# Patient Record
Sex: FEMALE | Race: BLACK OR AFRICAN AMERICAN | NOT HISPANIC OR LATINO | URBAN - METROPOLITAN AREA
[De-identification: names, ages, dates, MRNs, and addresses within clinical notes are randomized per-mention and may not be internally consistent; named-entity substitution may affect disease eponyms.]

---

## 2017-11-04 ENCOUNTER — EMERGENCY (EMERGENCY)
Facility: HOSPITAL | Age: 32
LOS: 1 days | Discharge: HOME | End: 2017-11-04
Admitting: EMERGENCY MEDICINE
Payer: OTHER MISCELLANEOUS

## 2017-11-04 VITALS
RESPIRATION RATE: 16 BRPM | DIASTOLIC BLOOD PRESSURE: 82 MMHG | HEART RATE: 88 BPM | SYSTOLIC BLOOD PRESSURE: 126 MMHG | OXYGEN SATURATION: 100 % | TEMPERATURE: 98 F

## 2017-11-04 PROCEDURE — 99283 EMERGENCY DEPT VISIT LOW MDM: CPT | Mod: 25

## 2017-11-04 PROCEDURE — 99053 MED SERV 10PM-8AM 24 HR FAC: CPT

## 2017-11-05 NOTE — ED PROVIDER NOTE - MEDICAL DECISION MAKING DETAILS
33 yo F, with no significant PMH presenting to ER c/o needle stick of left thumb today.   -low risk for transmission, PEP offer but patient refused, Occupation exposure labs, f/u with Employee health service.

## 2017-11-05 NOTE — ED PROVIDER NOTE - NS_EDPROVIDERDISPOUSERTYPE_ED_A_ED
After Visit Summary      Facility     Name Address Phone       [facility] [address] [place] [address] [phone]            Patient Discharge Summary   2/3/2017    Mainor Irving            Please bring this medication reconciliation form to your next doctor’s appointment(s). Please update the form if you stop taking any of these medications or you start taking any new medications including over the counter medications. Also please carry a copy of this form with you at all times in the event of an emergency.    A copy of these discharge instructions was reviewed with and given to the patient/patient representative/Guardian/Caregiver at discharge.          Allergies as of 2/3/2017     No Known Allergies           Discharge Medications           Your Medications       Start Taking     No Medications Reported      Continue These Medications Which Have Not Changed     ASPIRIN 81 MG CHEWABLE TABLET Chew 162 mg by mouth daily.    Notes:               LOSARTAN-HYDROCHLOROTHIAZIDE (HYZAAR) 100-25 MG PER TABLET Take 1 tablet by mouth daily.    Notes:               SIMVASTATIN (ZOCOR) 40 MG TABLET Take 40 mg by mouth nightly.    Notes:   --          TAMSULOSIN (FLOMAX) 0.4 MG CAP TAKE 1 CAPSULE BY MOUTH DAILY AFTER A MEAL.    Notes:   --            These Medications Have Changed     No Medications Reported      Stop taking these medications, discuss with your pharmacist     HYDROCODONE-ACETAMINOPHEN (NORCO) 5-325 MG PER TABLET Take 1-2 tablets by mouth every 6 hours as needed for Pain.    Notes:   --          PHENAZOPYRIDINE (PYRIDIUM) 100 MG TABLET Take 1 tablet by mouth 3 times daily as needed for Pain.    Notes:   --            Facility Administered Medications     No Medications Reported      Your To Do List     4/17/2017 2:00 PM     Appointment with Kai Raphael MD at [facility] ([phone])       8/14/2017 11:00 AM    Appointment with Radames Costello MD at [facility] ([phone])         Discharge Instructions     None      Discharge References/Attachments     None       Your Opinion Matters To Us  If you receive a patient satisfaction survey in the mail, please complete and return it in the postage-paid envelope.    We truly value and appreciate your feedback.           Mainor Irving        Your Current Medications Are        Details    tamsulosin (FLOMAX) 0.4 MG Cap TAKE 1 CAPSULE BY MOUTH DAILY AFTER A MEAL.    aspirin 81 MG chewable tablet Chew 162 mg by mouth daily.    losartan-hydrochlorothiazide (HYZAAR) 100-25 MG per tablet Take 1 tablet by mouth daily.    simvastatin (ZOCOR) 40 MG tablet Take 40 mg by mouth nightly.       I have personally evaluated and examined the patient. The Attending was available to me as a supervising provider if needed.

## 2017-11-05 NOTE — ED PROVIDER NOTE - OBJECTIVE STATEMENT
31 yo F with no significant PMH, presenting to ER c/o needle stick of left thumb while injecting lidocaine for a laceration repair at work. States she pricked herself with a 22 gauze needle through the glove with some bleeding. admits washing wound with soap and water. denies any fever, chills, n/v, dizziness, finger pain, numbness, chest pain, sob or any other complaints. 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 States she pricked herself with a 22 gauze needle through the glove with some bleeding. admits washing wound with soap and water. denies any fever, chills, n/v, dizziness, finger pain, numbness, chest pain, sob or any other complaints. 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 States she pricked herself with a 22 gauze needle through the glove with some bleeding. admits washing wound with soap and water. denies any fever, chills, n/v, dizziness, finger pain, numbness, chest pain, sob or any other complaints. No known infectious disease in source pt. Tetanus UTD.

## 2017-11-05 NOTE — ED PROVIDER NOTE - PROGRESS NOTE DETAILS
CHRISTIAN POST: I have personally seen and examined this patient. I have reviewed and addended the HPI, ROS, PE, and A/P as necessary. I agree with the above plan of care.

## 2019-10-03 ENCOUNTER — RESULT REVIEW (OUTPATIENT)
Age: 34
End: 2019-10-03

## 2019-10-04 ENCOUNTER — APPOINTMENT (OUTPATIENT)
Dept: OBGYN | Facility: CLINIC | Age: 34
End: 2019-10-04
Payer: COMMERCIAL

## 2019-10-04 PROCEDURE — 99385 PREV VISIT NEW AGE 18-39: CPT

## 2019-10-04 PROCEDURE — 36415 COLL VENOUS BLD VENIPUNCTURE: CPT

## 2019-10-30 ENCOUNTER — TRANSCRIPTION ENCOUNTER (OUTPATIENT)
Age: 34
End: 2019-10-30

## 2020-01-31 ENCOUNTER — EMERGENCY (EMERGENCY)
Facility: HOSPITAL | Age: 35
LOS: 1 days | Discharge: HOME | End: 2020-01-31
Attending: EMERGENCY MEDICINE | Admitting: EMERGENCY MEDICINE
Payer: COMMERCIAL

## 2020-01-31 VITALS
RESPIRATION RATE: 17 BRPM | HEART RATE: 77 BPM | SYSTOLIC BLOOD PRESSURE: 127 MMHG | TEMPERATURE: 99 F | DIASTOLIC BLOOD PRESSURE: 90 MMHG | OXYGEN SATURATION: 100 %

## 2020-01-31 VITALS
HEART RATE: 79 BPM | OXYGEN SATURATION: 99 % | DIASTOLIC BLOOD PRESSURE: 90 MMHG | TEMPERATURE: 98 F | RESPIRATION RATE: 16 BRPM | SYSTOLIC BLOOD PRESSURE: 129 MMHG

## 2020-01-31 DIAGNOSIS — R07.89 OTHER CHEST PAIN: ICD-10-CM

## 2020-01-31 LAB
ALBUMIN SERPL ELPH-MCNC: 4.3 G/DL — SIGNIFICANT CHANGE UP (ref 3.3–5)
ALP SERPL-CCNC: 53 U/L — SIGNIFICANT CHANGE UP (ref 40–120)
ALT FLD-CCNC: 7 U/L — LOW (ref 10–45)
ANION GAP SERPL CALC-SCNC: 14 MMOL/L — SIGNIFICANT CHANGE UP (ref 5–17)
AST SERPL-CCNC: 14 U/L — SIGNIFICANT CHANGE UP (ref 10–40)
BILIRUB SERPL-MCNC: 0.2 MG/DL — SIGNIFICANT CHANGE UP (ref 0.2–1.2)
BUN SERPL-MCNC: 9 MG/DL — SIGNIFICANT CHANGE UP (ref 7–23)
CALCIUM SERPL-MCNC: 9 MG/DL — SIGNIFICANT CHANGE UP (ref 8.4–10.5)
CHLORIDE SERPL-SCNC: 106 MMOL/L — SIGNIFICANT CHANGE UP (ref 96–108)
CO2 SERPL-SCNC: 22 MMOL/L — SIGNIFICANT CHANGE UP (ref 22–31)
CREAT SERPL-MCNC: 0.62 MG/DL — SIGNIFICANT CHANGE UP (ref 0.5–1.3)
GLUCOSE SERPL-MCNC: 77 MG/DL — SIGNIFICANT CHANGE UP (ref 70–99)
HCG UR QL: NEGATIVE — SIGNIFICANT CHANGE UP
HCT VFR BLD CALC: 39.8 % — SIGNIFICANT CHANGE UP (ref 34.5–45)
HGB BLD-MCNC: 12.9 G/DL — SIGNIFICANT CHANGE UP (ref 11.5–15.5)
MCHC RBC-ENTMCNC: 29.4 PG — SIGNIFICANT CHANGE UP (ref 27–34)
MCHC RBC-ENTMCNC: 32.4 GM/DL — SIGNIFICANT CHANGE UP (ref 32–36)
MCV RBC AUTO: 90.7 FL — SIGNIFICANT CHANGE UP (ref 80–100)
NRBC # BLD: 0 /100 WBCS — SIGNIFICANT CHANGE UP (ref 0–0)
PLATELET # BLD AUTO: 259 K/UL — SIGNIFICANT CHANGE UP (ref 150–400)
POTASSIUM SERPL-MCNC: 3.8 MMOL/L — SIGNIFICANT CHANGE UP (ref 3.5–5.3)
POTASSIUM SERPL-SCNC: 3.8 MMOL/L — SIGNIFICANT CHANGE UP (ref 3.5–5.3)
PROT SERPL-MCNC: 7.7 G/DL — SIGNIFICANT CHANGE UP (ref 6–8.3)
RBC # BLD: 4.39 M/UL — SIGNIFICANT CHANGE UP (ref 3.8–5.2)
RBC # FLD: 12.4 % — SIGNIFICANT CHANGE UP (ref 10.3–14.5)
SODIUM SERPL-SCNC: 142 MMOL/L — SIGNIFICANT CHANGE UP (ref 135–145)
TROPONIN T SERPL-MCNC: <0.01 NG/ML — SIGNIFICANT CHANGE UP (ref 0–0.01)
WBC # BLD: 4.78 K/UL — SIGNIFICANT CHANGE UP (ref 3.8–10.5)
WBC # FLD AUTO: 4.78 K/UL — SIGNIFICANT CHANGE UP (ref 3.8–10.5)

## 2020-01-31 PROCEDURE — 71046 X-RAY EXAM CHEST 2 VIEWS: CPT | Mod: 26

## 2020-01-31 PROCEDURE — 93010 ELECTROCARDIOGRAM REPORT: CPT

## 2020-01-31 PROCEDURE — 36415 COLL VENOUS BLD VENIPUNCTURE: CPT

## 2020-01-31 PROCEDURE — 99284 EMERGENCY DEPT VISIT MOD MDM: CPT | Mod: 25

## 2020-01-31 PROCEDURE — 84484 ASSAY OF TROPONIN QUANT: CPT

## 2020-01-31 PROCEDURE — 71046 X-RAY EXAM CHEST 2 VIEWS: CPT

## 2020-01-31 PROCEDURE — 80053 COMPREHEN METABOLIC PANEL: CPT

## 2020-01-31 PROCEDURE — 99285 EMERGENCY DEPT VISIT HI MDM: CPT | Mod: 25

## 2020-01-31 PROCEDURE — 81025 URINE PREGNANCY TEST: CPT

## 2020-01-31 PROCEDURE — 85027 COMPLETE CBC AUTOMATED: CPT

## 2020-01-31 PROCEDURE — 93005 ELECTROCARDIOGRAM TRACING: CPT

## 2020-01-31 RX ORDER — SODIUM CHLORIDE 9 MG/ML
1000 INJECTION INTRAMUSCULAR; INTRAVENOUS; SUBCUTANEOUS ONCE
Refills: 0 | Status: COMPLETED | OUTPATIENT
Start: 2020-01-31 | End: 2020-01-31

## 2020-01-31 NOTE — ED PROVIDER NOTE - NSFOLLOWUPINSTRUCTIONS_ED_ALL_ED_FT
Chest Wall Pain  Chest wall pain is pain in or around the bones and muscles of your chest. Sometimes, an injury causes this pain. Excessive coughing or overuse of arm and chest muscles may also cause chest wall pain. Sometimes, the cause may not be known. This pain may take several weeks or longer to get better.  Follow these instructions at home:  Managing pain, stiffness, and swelling        If directed, put ice on the painful area:  Put ice in a plastic bag.Place a towel between your skin and the bag.Leave the ice on for 20 minutes, 2–3 times per day.Activity     Rest as told by your health care provider.Avoid activities that cause pain. These include any activities that use your chest muscles or your abdominal and side muscles to lift heavy items. Ask your health care provider what activities are safe for you.General instructions        Take over-the-counter and prescription medicines only as told by your health care provider.Do not use any products that contain nicotine or tobacco, such as cigarettes, e-cigarettes, and chewing tobacco. These can delay healing after injury. If you need help quitting, ask your health care provider.Keep all follow-up visits as told by your health care provider. This is important.Contact a health care provider if:  You have a fever.Your chest pain becomes worse.You have new symptoms.Get help right away if:  You have nausea or vomiting.You feel sweaty or light-headed.You have a cough with mucus from your lungs (sputum) or you cough up blood.You develop shortness of breath.These symptoms may represent a serious problem that is an emergency. Do not wait to see if the symptoms will go away. Get medical help right away. Call your local emergency services (911 in the [place] Do not drive yourself to the hospital.   Summary  Chest wall pain is pain in or around the bones and muscles of your chest.Depending on the cause, it may be treated with ice, rest, medicines, and avoiding activities that cause pain.Contact a health care provider if you have a fever, worsening chest pain, or new symptoms.Get help right away if you feel light-headed or you develop shortness of breath. These symptoms may be an emergency.This information is not intended to replace advice given to you by your health care provider. Make sure you discuss any questions you have with your health care provider.    Document Released: 12/18/2006 Document Revised: 06/20/2019 Document Reviewed: 06/20/2019  [employer] Interactive Patient Education © 2019 [employer]

## 2020-01-31 NOTE — ED ADULT NURSE NOTE - NSIMPLEMENTINTERV_GEN_ALL_ED
Implemented All Universal Safety Interventions:  [place] to call system. Call bell, personal items and telephone within reach. Instruct patient to call for assistance. Room bathroom lighting operational. Non-slip footwear when patient is off stretcher. Physically safe environment: no spills, clutter or unnecessary equipment. Stretcher in lowest position, wheels locked, appropriate side rails in place.

## 2020-01-31 NOTE — ED ADULT NURSE NOTE - OBJECTIVE STATEMENT
35 yo F no pmhx BIBEMS co chest pain. PEr pt, "I have chest pain that doesn't radiate anywhere that started as I was doing abs during my workout." Pt denies hx of chest pain/ has never felt chest pain prior to today. Upon arrival pain 2/10 without radiation, initially pain 7/10 that decreased with rest. Upon arrival pt is AOx3, skin warm and dry, lung sounds clear. Pt denying SOB, N/V, lightheadedness, dizziness. will continue to assess.

## 2020-01-31 NOTE — ED PROVIDER NOTE - CLINICAL SUMMARY MEDICAL DECISION MAKING FREE TEXT BOX
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

## 2020-01-31 NOTE — ED PROVIDER NOTE - PATIENT PORTAL LINK FT
You can access the FollowMyHealth Patient Portal offered by [facility] by registering at the following website: [email or web address]. By joining [facility]’s FollowMyHealth portal, you will also be able to view your health information using other applications (apps) compatible with our system.

## 2020-01-31 NOTE — ED PROVIDER NOTE - DIAGNOSTIC INTERPRETATION
ER Physician: Álvaro Suarez  CHEST XRAY INTERPRETATION: lungs clear, heart shadow normal, bony structures intact

## 2020-01-31 NOTE — ED PROVIDER NOTE - OBJECTIVE STATEMENT
35 y/o F with no PMHx presents to the ED complaining of chest pain. Pt had finished a 30 minute workout and was stretching in her apartment when she experienced left sided chest pain and a pulse of 110bpm. The pain is worse with movement and deep breathing, and better after EMS administered 162 aspirin PTA. Pt now rates the pain a 3/10. Denies nausea, abdominal pain, neck pain, arm pain, cold symptoms, and shortness of breath. Pt experienced palpitations 2 years ago but had a normal EKG and normal echo, did not experience syncope, and has not had palpitations since. Pt is currently on birth control pills, and ate 1 sleeve of a granola bar PTA. 35 y/o F with no PMHx presents to the ED complaining of chest pain. Pt had finished a 30 minute workout and was stretching in her apartment when she experienced left sided chest pain and a pulse of 110bpm. The pain is worse with movement and deep breathing, and better after EMS administered 162 aspirin PTA. Pt now rates the pain a 3/10. Denies nausea, abdominal pain, neck pain, arm pain, cold symptoms, and shortness of breath. Pt experienced palpitations 2 years ago but had a normal EKG and normal echo, did not experience syncope, and has not had palpitations since. Pt is currently on birth control pills, and ate 1 sleeve of a granola bar PTA. no sob no n/v no cough

## 2020-04-25 ENCOUNTER — MESSAGE (OUTPATIENT)
Age: 35
End: 2020-04-25

## 2020-05-11 ENCOUNTER — APPOINTMENT (OUTPATIENT)
Dept: DISASTER EMERGENCY | Facility: HOSPITAL | Age: 35
End: 2020-05-11

## 2020-05-12 LAB
SARS-COV-2 IGG SERPL IA-ACNC: <0.1 INDEX
SARS-COV-2 IGG SERPL QL IA: NEGATIVE

## 2020-06-22 NOTE — ED ADULT TRIAGE NOTE - BP NONINVASIVE SYSTOLIC (MM HG)
126 Dapsone Pregnancy And Lactation Text: This medication is Pregnancy Category C and is not considered safe during pregnancy or breast feeding.

## 2020-09-14 ENCOUNTER — APPOINTMENT (OUTPATIENT)
Dept: OTOLARYNGOLOGY | Facility: CLINIC | Age: 35
End: 2020-09-14
Payer: COMMERCIAL

## 2020-09-14 VITALS
SYSTOLIC BLOOD PRESSURE: 120 MMHG | WEIGHT: 118 LBS | TEMPERATURE: 97.4 F | HEIGHT: 64 IN | DIASTOLIC BLOOD PRESSURE: 70 MMHG | HEART RATE: 72 BPM | BODY MASS INDEX: 20.14 KG/M2

## 2020-09-14 PROCEDURE — 31575 DIAGNOSTIC LARYNGOSCOPY: CPT

## 2020-09-14 PROCEDURE — 99204 OFFICE O/P NEW MOD 45 MIN: CPT | Mod: 25

## 2020-09-14 NOTE — HISTORY OF PRESENT ILLNESS
[de-identified] : 35 y/o F, notes 2 weeks ago had Foreign body sensation after eating.  Sensation was on right side and behind tonsil.  Started after eating and resolved in 2 days.  then  about 4 days later had recurrence after eating, again resolved spontaneously within about 24 hours.  Has not had discomfort in the past week, however feels it is not back to normal. \par No change in voice, no dysphagia.  No SOB. Has not noted tonsil stones. No fever/chills. no unintentional weight loss.

## 2020-09-14 NOTE — ASSESSMENT
[FreeTextEntry1] : Foreign body sensation that has now resolved.\par - Normal FOE with no foreign body\par - Likely small abrasion while eating that has now healed. \par - F/u PRN, if symptoms return.  Will get her in that day or next to evaluate at time of symptoms since could be having aphthous ulcer or tonsil stones\par

## 2020-09-14 NOTE — PROCEDURE
[de-identified] : Flexible scope #G1 used. Passed through nasal passage and nasopharynx/oropharynx/hypopharynx clear. Tonsils visualized posteriorly and normal/symmetric in appearance without lesions or ulcers or masses. No FB noted. Supraglottis normal. Glottis with fully mobile vocal cords without lesions or masses. Visualized subglottis clear. Postcricoid area without erythema or edema. No pooling of secretions.\par \par

## 2020-09-14 NOTE — CONSULT LETTER
[Consult Letter:] : I had the pleasure of evaluating your patient, [unfilled]. [Please see my note below.] : Please see my note below. [Consult Closing:] : Thank you very much for allowing me to participate in the care of this patient.  If you have any questions, please do not hesitate to contact me. [Sincerely,] : Sincerely, [Dear  ___] : Dear ~TAMIE, [FreeTextEntry2] : [facility]\par [address], [place] [address]\par  [phone] [FreeTextEntry3] : Margaret Bowens MD\par Otolaryngology and Cranial Base Surgery\par Attending Physician - Department of Otolaryngology and Head & Neck Surgery\par [facility]\par  - [employer] [facility]\par Office: [phone]\par Fax: [phone]\par

## 2021-07-12 ENCOUNTER — NON-APPOINTMENT (OUTPATIENT)
Age: 36
End: 2021-07-12

## 2021-07-12 ENCOUNTER — APPOINTMENT (OUTPATIENT)
Dept: ORTHOPEDIC SURGERY | Facility: CLINIC | Age: 36
End: 2021-07-12
Payer: COMMERCIAL

## 2021-07-12 DIAGNOSIS — M54.2 CERVICALGIA: ICD-10-CM

## 2021-07-12 PROCEDURE — 99072 ADDL SUPL MATRL&STAF TM PHE: CPT

## 2021-07-12 PROCEDURE — 99204 OFFICE O/P NEW MOD 45 MIN: CPT

## 2021-07-12 PROCEDURE — 72082 X-RAY EXAM ENTIRE SPI 2/3 VW: CPT

## 2021-07-12 NOTE — HISTORY OF PRESENT ILLNESS
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  She denies a history of prior neck problems.  The pain also radiated to the anterior chest.  She has not had associated neurologic symptoms of numbness, paresthesias or weakness.  The pain is worse coughing and sneezing but no worse forcing to move her bowels.  The pain is worse at night.  There have been no changes in her gait or balance.  She was initially taking ibuprofen 400 mg once a day and after visiting an urgent care was increased to 800 mg once a day along with Flexeril.  Her past medical history and review of systems are negative.  She did have a minor foot operation in 1990.  She exercises regularly. [7] : a current pain level of 7/10

## 2021-07-12 NOTE — PHYSICAL EXAM
[de-identified] : She is fully alert and oriented with a normal mood and affect.  She is in no acute distress as I take the history.  She ambulates with a normal gait including tiptoe and heel walking.  There is no evidence of unsteadiness or spasticity.  On stance evaluation there is a mild shoulder height discrepancy with a level pelvis and there is a mild waistline asymmetry.  Cutaneous examination of the spine reveals only some mild acne scars.  There is no evidence of shortness of breath or respiratory distress.  With forward flexion of the spine there is a small left upper thoracic paravertebral prominence consistent with a mild scoliosis.  Her lower extremity neurological examination revealed 1+ symmetrical reflexes.  Toes are downgoing.  Motor power is normal to manual testing in all lower extremity groups and sensation is normal to light touch in all dermatomes.  Straight leg raising is negative to 90 degrees in the sitting position.  There is scar on the dorsum of 1 metatarsal phalangeal joint from her prior surgery in 1990.  Her hips and knees have a full and painless range of motion with normal stability.  Vascular examination shows no evidence of varicosities and there is no lymphedema.  There are no other cutaneous abnormalities in the extremities.  Her upper extremity neurological examination revealed 1-2+ symmetrical reflexes.  Motor power is normal to manual testing in all upper extremity groups and sensation is normal to light touch in all dermatomes.  Range of motion of the cervical spine showed flexion with the chin reaching to within 2 fingerbreadths of the chest.  Extension was to 75 degrees with discomfort and rotation of the 70 degrees bilaterally with discomfort and tilt of 20 degrees bilaterally with discomfort.  There is a mild restriction of forward flexion of both shoulders of approximately 15 degrees and there is only 20 degrees of internal rotation of her right shoulder. [de-identified] : AP lateral x-rays of the cervical spine reveal normal sagittal alignment.  Disc heights are all relatively well-maintained.  There are no destructive changes.

## 2021-07-12 NOTE — DISCUSSION/SUMMARY
[Medication Risks Reviewed] : Medication risks reviewed [de-identified] : She will rest and use moist heat and we discussed daily activities she needs to avoid which may well aggravate her cervical spine symptoms.  I will increase the ibuprofen to 800 mg 3 times a day with instructions to continue that dose for 3 to 4 days after she has no ache or pain, only a discomfort or less.  She will then lower the dose to 600 mg 3 times a day for 2 or 3 days after she is fully better.  She will call if there are problems with the medication or worsening of her symptoms.  I will see her for follow-up in 3 weeks on a as needed basis.

## 2021-11-02 ENCOUNTER — ASOB RESULT (OUTPATIENT)
Age: 36
End: 2021-11-02

## 2021-11-02 ENCOUNTER — APPOINTMENT (OUTPATIENT)
Dept: OBGYN | Facility: CLINIC | Age: 36
End: 2021-11-02
Payer: COMMERCIAL

## 2021-11-02 VITALS
BODY MASS INDEX: 20.49 KG/M2 | HEART RATE: 73 BPM | DIASTOLIC BLOOD PRESSURE: 80 MMHG | SYSTOLIC BLOOD PRESSURE: 114 MMHG | HEIGHT: 64 IN | WEIGHT: 120 LBS

## 2021-11-02 DIAGNOSIS — Z32.01 ENCOUNTER FOR PREGNANCY TEST, RESULT POSITIVE: ICD-10-CM

## 2021-11-02 DIAGNOSIS — Z78.9 OTHER SPECIFIED HEALTH STATUS: ICD-10-CM

## 2021-11-02 DIAGNOSIS — Z82.49 FAMILY HISTORY OF ISCHEMIC HEART DISEASE AND OTHER DISEASES OF THE CIRCULATORY SYSTEM: ICD-10-CM

## 2021-11-02 PROCEDURE — 99214 OFFICE O/P EST MOD 30 MIN: CPT

## 2021-11-02 PROCEDURE — 76817 TRANSVAGINAL US OBSTETRIC: CPT

## 2021-11-02 RX ORDER — DESOGESTREL AND ETHINYL ESTRADIOL 0.15-0.03
0.15-3 KIT ORAL
Refills: 0 | Status: DISCONTINUED | COMMUNITY
End: 2021-11-02

## 2021-11-03 ENCOUNTER — TRANSCRIPTION ENCOUNTER (OUTPATIENT)
Age: 36
End: 2021-11-03

## 2021-11-04 ENCOUNTER — TRANSCRIPTION ENCOUNTER (OUTPATIENT)
Age: 36
End: 2021-11-04

## 2021-11-08 PROBLEM — Z78.9 DOES NOT USE ILLICIT DRUGS: Status: ACTIVE | Noted: 2021-11-08

## 2021-11-08 PROBLEM — Z78.9 SOCIAL ALCOHOL USE: Status: ACTIVE | Noted: 2021-11-08

## 2021-11-08 PROBLEM — Z78.9 NEVER SMOKED TOBACCO: Status: ACTIVE | Noted: 2021-11-08

## 2021-11-08 PROBLEM — Z82.49 FAMILY HISTORY OF HYPERTENSION: Status: ACTIVE | Noted: 2021-11-08

## 2021-11-08 LAB
ABO + RH PNL BLD: NORMAL
B19V IGG SER QL IA: 7.66 INDEX
B19V IGG+IGM SER-IMP: NORMAL
B19V IGG+IGM SER-IMP: POSITIVE
B19V IGM FLD-ACNC: 0.13 INDEX
B19V IGM SER-ACNC: NEGATIVE
BACTERIA UR CULT: NORMAL
BASOPHILS # BLD AUTO: 0.03 K/UL
BASOPHILS NFR BLD AUTO: 0.5 %
BLD GP AB SCN SERPL QL: NORMAL
C TRACH RRNA SPEC QL NAA+PROBE: NOT DETECTED
CMV IGG SERPL QL: <0.2 U/ML
CMV IGG SERPL-IMP: NEGATIVE
CMV IGM SERPL QL: <8 AU/ML
CMV IGM SERPL QL: NEGATIVE
CYTOLOGY CVX/VAG DOC THIN PREP: ABNORMAL
EOSINOPHIL # BLD AUTO: 0.03 K/UL
EOSINOPHIL NFR BLD AUTO: 0.5 %
HBV SURFACE AG SER QL: NONREACTIVE
HCT VFR BLD CALC: 40.7 %
HCV AB SER QL: NONREACTIVE
HCV S/CO RATIO: 0.57 S/CO
HGB A MFR BLD: 97 %
HGB A2 MFR BLD: 3 %
HGB BLD-MCNC: 13.6 G/DL
HGB FRACT BLD-IMP: NORMAL
HIV1+2 AB SPEC QL IA.RAPID: NONREACTIVE
HPV HIGH+LOW RISK DNA PNL CVX: NOT DETECTED
IMM GRANULOCYTES NFR BLD AUTO: 0.3 %
LEAD BLD-MCNC: <1 UG/DL
LYMPHOCYTES # BLD AUTO: 2.05 K/UL
LYMPHOCYTES NFR BLD AUTO: 33 %
MAN DIFF?: NORMAL
MCHC RBC-ENTMCNC: 31.2 PG
MCHC RBC-ENTMCNC: 33.4 GM/DL
MCV RBC AUTO: 93.3 FL
MONOCYTES # BLD AUTO: 0.69 K/UL
MONOCYTES NFR BLD AUTO: 11.1 %
N GONORRHOEA RRNA SPEC QL NAA+PROBE: NOT DETECTED
NEUTROPHILS # BLD AUTO: 3.39 K/UL
NEUTROPHILS NFR BLD AUTO: 54.6 %
PLATELET # BLD AUTO: 309 K/UL
RBC # BLD: 4.36 M/UL
RBC # FLD: 12.6 %
RUBV IGG FLD-ACNC: 3 INDEX
RUBV IGG SER-IMP: POSITIVE
SOURCE AMPLIFICATION: NORMAL
T PALLIDUM AB SER QL IA: NEGATIVE
VZV AB TITR SER: POSITIVE
VZV IGG SER IF-ACNC: 1842 INDEX
WBC # FLD AUTO: 6.21 K/UL

## 2021-11-15 ENCOUNTER — ASOB RESULT (OUTPATIENT)
Age: 36
End: 2021-11-15

## 2021-11-15 ENCOUNTER — APPOINTMENT (OUTPATIENT)
Dept: MATERNAL FETAL MEDICINE | Facility: CLINIC | Age: 36
End: 2021-11-15
Payer: COMMERCIAL

## 2021-11-15 PROCEDURE — 99212 OFFICE O/P EST SF 10 MIN: CPT | Mod: 95

## 2021-11-15 PROCEDURE — 99202 OFFICE O/P NEW SF 15 MIN: CPT | Mod: 95

## 2021-11-19 ENCOUNTER — ASOB RESULT (OUTPATIENT)
Age: 36
End: 2021-11-19

## 2021-11-19 ENCOUNTER — APPOINTMENT (OUTPATIENT)
Dept: ANTEPARTUM | Facility: CLINIC | Age: 36
End: 2021-11-19
Payer: COMMERCIAL

## 2021-11-19 PROCEDURE — 76801 OB US < 14 WKS SINGLE FETUS: CPT

## 2021-11-19 PROCEDURE — 76813 OB US NUCHAL MEAS 1 GEST: CPT

## 2021-11-19 PROCEDURE — 36416 COLLJ CAPILLARY BLOOD SPEC: CPT

## 2021-11-20 LAB
AR GENE MUT ANL BLD/T: NORMAL
CFTR MUT TESTED BLD/T: NEGATIVE
FMR1 GENE MUT ANL BLD/T: NORMAL

## 2021-11-30 ENCOUNTER — TRANSCRIPTION ENCOUNTER (OUTPATIENT)
Age: 36
End: 2021-11-30

## 2021-12-01 ENCOUNTER — NON-APPOINTMENT (OUTPATIENT)
Age: 36
End: 2021-12-01

## 2021-12-03 ENCOUNTER — APPOINTMENT (OUTPATIENT)
Dept: ANTEPARTUM | Facility: CLINIC | Age: 36
End: 2021-12-03

## 2021-12-03 ENCOUNTER — APPOINTMENT (OUTPATIENT)
Dept: OBGYN | Facility: CLINIC | Age: 36
End: 2021-12-03
Payer: COMMERCIAL

## 2021-12-03 VITALS
HEIGHT: 64 IN | DIASTOLIC BLOOD PRESSURE: 83 MMHG | BODY MASS INDEX: 20.66 KG/M2 | WEIGHT: 121 LBS | SYSTOLIC BLOOD PRESSURE: 121 MMHG | HEART RATE: 96 BPM

## 2021-12-03 PROCEDURE — 0502F SUBSEQUENT PRENATAL CARE: CPT

## 2021-12-29 ENCOUNTER — APPOINTMENT (OUTPATIENT)
Dept: OBGYN | Facility: CLINIC | Age: 36
End: 2021-12-29
Payer: COMMERCIAL

## 2021-12-29 VITALS
HEIGHT: 64 IN | BODY MASS INDEX: 21 KG/M2 | SYSTOLIC BLOOD PRESSURE: 115 MMHG | DIASTOLIC BLOOD PRESSURE: 75 MMHG | WEIGHT: 123 LBS | TEMPERATURE: 97.7 F | HEART RATE: 94 BPM

## 2021-12-29 PROCEDURE — 0502F SUBSEQUENT PRENATAL CARE: CPT

## 2022-01-03 ENCOUNTER — TRANSCRIPTION ENCOUNTER (OUTPATIENT)
Age: 37
End: 2022-01-03

## 2022-01-08 LAB
2ND TRIMESTER DATA: NORMAL
ADDENDUM DOC: NORMAL
AFP PNL SERPL: NORMAL
AFP SERPL-ACNC: NORMAL
CLINICAL BIOCHEMIST REVIEW: NORMAL
NOTES NTD: NORMAL

## 2022-01-18 ENCOUNTER — APPOINTMENT (OUTPATIENT)
Dept: ANTEPARTUM | Facility: CLINIC | Age: 37
End: 2022-01-18
Payer: COMMERCIAL

## 2022-01-18 ENCOUNTER — ASOB RESULT (OUTPATIENT)
Age: 37
End: 2022-01-18

## 2022-01-18 PROCEDURE — 76811 OB US DETAILED SNGL FETUS: CPT

## 2022-01-27 ENCOUNTER — APPOINTMENT (OUTPATIENT)
Dept: OBGYN | Facility: CLINIC | Age: 37
End: 2022-01-27
Payer: COMMERCIAL

## 2022-01-27 VITALS
WEIGHT: 131 LBS | BODY MASS INDEX: 22.36 KG/M2 | SYSTOLIC BLOOD PRESSURE: 117 MMHG | HEART RATE: 94 BPM | HEIGHT: 64 IN | TEMPERATURE: 204.26 F | DIASTOLIC BLOOD PRESSURE: 79 MMHG

## 2022-01-27 PROCEDURE — 0502F SUBSEQUENT PRENATAL CARE: CPT

## 2022-02-03 ENCOUNTER — TRANSCRIPTION ENCOUNTER (OUTPATIENT)
Age: 37
End: 2022-02-03

## 2022-02-18 ENCOUNTER — NON-APPOINTMENT (OUTPATIENT)
Age: 37
End: 2022-02-18

## 2022-02-19 ENCOUNTER — OUTPATIENT (OUTPATIENT)
Dept: INPATIENT UNIT | Facility: HOSPITAL | Age: 37
LOS: 1 days | Discharge: HOME | End: 2022-02-19
Payer: COMMERCIAL

## 2022-02-19 VITALS — OXYGEN SATURATION: 100 % | HEART RATE: 74 BPM

## 2022-02-19 VITALS — SYSTOLIC BLOOD PRESSURE: 108 MMHG | DIASTOLIC BLOOD PRESSURE: 71 MMHG | HEART RATE: 75 BPM

## 2022-02-19 DIAGNOSIS — O26.899 OTHER SPECIFIED PREGNANCY RELATED CONDITIONS, UNSPECIFIED TRIMESTER: ICD-10-CM

## 2022-02-19 DIAGNOSIS — Z3A.00 WEEKS OF GESTATION OF PREGNANCY NOT SPECIFIED: ICD-10-CM

## 2022-02-19 DIAGNOSIS — Z98.890 OTHER SPECIFIED POSTPROCEDURAL STATES: Chronic | ICD-10-CM

## 2022-02-19 PROCEDURE — 99202 OFFICE O/P NEW SF 15 MIN: CPT | Mod: 25

## 2022-02-19 PROCEDURE — 59025 FETAL NON-STRESS TEST: CPT | Mod: 26

## 2022-02-19 PROCEDURE — 76815 OB US LIMITED FETUS(S): CPT | Mod: 26

## 2022-02-19 NOTE — OB PROVIDER TRIAGE NOTE - NSHPPHYSICALEXAM_GEN_ALL_CORE
Assessment reveals VSS, abdomen soft, NT, gravid.   BPP 8/8, MVP 9.2, vtx, posterior placenta, polyhydramios noted, EFW 894g  Cat 1 FHT, no ctx on toco. Assessment reveals VSS, abdomen soft, NT, gravid.   /72, pulse 71, O2 sat 98%  Lungs CTA bilaterally  Normal S1, S2, RRR  Patient well appearing  BPP 8/8, MVP 9.2, vtx, posterior placenta, EFW 894g  Cat 1 FHT, irritability on toco.    FHT discontinuous due to fetal movement  Tracing reviewed by Dr. Casanova

## 2022-02-19 NOTE — OB RN TRIAGE NOTE - NS_MODEOFARRIVAL_OBGYN_ALL_OB
Car [place] Counseling:  I discussed with the patient the risks of etanercept including but not limited to myelosuppression, immunosuppression, autoimmune hepatitis, demyelinating diseases, lymphoma, and infections.  The patient understands that monitoring is required including a PPD at baseline and must alert us or the primary physician if symptoms of infection or other concerning signs are noted.

## 2022-02-19 NOTE — OB PROVIDER TRIAGE NOTE - ADDITIONAL INSTRUCTIONS
Follow up as scheduled  Go to ED for shortness of breath or chest pain.   Call MD for leaking of fluid, vaginal bleeding, decrease in fetal movement or abdominal pain.

## 2022-02-19 NOTE — OB RN TRIAGE NOTE - FALL HARM RISK - UNIVERSAL INTERVENTIONS
Bed in lowest position, wheels locked, appropriate side rails in place/Call bell, personal items and telephone in reach/Instruct patient to call for assistance before getting out of bed or chair/Non-slip footwear when patient is out of bed/[place] to call system/Physically safe environment - no spills, clutter or unnecessary equipment/Purposeful Proactive Rounding/Room/bathroom lighting operational, light cord in reach

## 2022-02-19 NOTE — OB PROVIDER TRIAGE NOTE - NSOBPROVIDERNOTE_OBGYN_ALL_OB_FT
Plan D/W Dr. Casanova, normal fetal testing  Follow up as scheduled  Go to ED for shortness of breath or chest pain.   Call MD for leaking of fluid, vaginal bleeding, decrease in fetal movement or abdominal pain.

## 2022-02-19 NOTE — OB RN TRIAGE NOTE - NSNURSINGINSTR_OBGYN_ALL_OB_FT
Pt discharged home with instructions given by KWADWO Giron. Pt verbalizes understanding of discharge instructions.

## 2022-02-19 NOTE — OB PROVIDER TRIAGE NOTE - HISTORY OF PRESENT ILLNESS
35yo  @ 25.0 present for fetal evaluation. Patient tested positive for Covid-19  with symptoms of congestion and cough. Denies any OB complaints and reports GFM.   Fully vaccinated x 3 for covid    Denies PMH/PSH

## 2022-02-28 ENCOUNTER — ASOB RESULT (OUTPATIENT)
Age: 37
End: 2022-02-28

## 2022-02-28 ENCOUNTER — APPOINTMENT (OUTPATIENT)
Dept: OBGYN | Facility: CLINIC | Age: 37
End: 2022-02-28
Payer: COMMERCIAL

## 2022-02-28 PROCEDURE — 76805 OB US >/= 14 WKS SNGL FETUS: CPT

## 2022-03-02 ENCOUNTER — APPOINTMENT (OUTPATIENT)
Dept: OBGYN | Facility: CLINIC | Age: 37
End: 2022-03-02
Payer: COMMERCIAL

## 2022-03-02 ENCOUNTER — NON-APPOINTMENT (OUTPATIENT)
Age: 37
End: 2022-03-02

## 2022-03-02 VITALS
HEART RATE: 80 BPM | BODY MASS INDEX: 23.22 KG/M2 | SYSTOLIC BLOOD PRESSURE: 103 MMHG | TEMPERATURE: 209.48 F | WEIGHT: 136 LBS | HEIGHT: 64 IN | DIASTOLIC BLOOD PRESSURE: 72 MMHG

## 2022-03-02 LAB
BASOPHILS # BLD AUTO: 0.01 K/UL
BASOPHILS NFR BLD AUTO: 0.1 %
EOSINOPHIL # BLD AUTO: 0.1 K/UL
EOSINOPHIL NFR BLD AUTO: 1.5 %
HCT VFR BLD CALC: 33.3 %
HGB BLD-MCNC: 11.2 G/DL
IMM GRANULOCYTES NFR BLD AUTO: 0.4 %
LYMPHOCYTES # BLD AUTO: 1.73 K/UL
LYMPHOCYTES NFR BLD AUTO: 25.1 %
MAN DIFF?: NORMAL
MCHC RBC-ENTMCNC: 31.6 PG
MCHC RBC-ENTMCNC: 33.6 GM/DL
MCV RBC AUTO: 94.1 FL
MONOCYTES # BLD AUTO: 0.57 K/UL
MONOCYTES NFR BLD AUTO: 8.3 %
NEUTROPHILS # BLD AUTO: 4.44 K/UL
NEUTROPHILS NFR BLD AUTO: 64.6 %
PLATELET # BLD AUTO: 251 K/UL
RBC # BLD: 3.54 M/UL
RBC # FLD: 13.4 %
WBC # FLD AUTO: 6.88 K/UL

## 2022-03-02 PROCEDURE — 0502F SUBSEQUENT PRENATAL CARE: CPT

## 2022-03-08 ENCOUNTER — TRANSCRIPTION ENCOUNTER (OUTPATIENT)
Age: 37
End: 2022-03-08

## 2022-03-24 ENCOUNTER — APPOINTMENT (OUTPATIENT)
Dept: OBGYN | Facility: CLINIC | Age: 37
End: 2022-03-24
Payer: COMMERCIAL

## 2022-03-24 VITALS
SYSTOLIC BLOOD PRESSURE: 108 MMHG | HEART RATE: 77 BPM | BODY MASS INDEX: 23.22 KG/M2 | WEIGHT: 136 LBS | HEIGHT: 64 IN | DIASTOLIC BLOOD PRESSURE: 69 MMHG

## 2022-03-24 LAB
GLUCOSE 1H P 50 G GLC PO SERPL-MCNC: 96 MG/DL
T PALLIDUM AB SER QL IA: NEGATIVE

## 2022-03-24 PROCEDURE — 0502F SUBSEQUENT PRENATAL CARE: CPT

## 2022-04-12 ENCOUNTER — TRANSCRIPTION ENCOUNTER (OUTPATIENT)
Age: 37
End: 2022-04-12

## 2022-04-12 ENCOUNTER — NON-APPOINTMENT (OUTPATIENT)
Age: 37
End: 2022-04-12

## 2022-04-13 ENCOUNTER — ASOB RESULT (OUTPATIENT)
Age: 37
End: 2022-04-13

## 2022-04-13 ENCOUNTER — MED ADMIN CHARGE (OUTPATIENT)
Age: 37
End: 2022-04-13

## 2022-04-13 ENCOUNTER — APPOINTMENT (OUTPATIENT)
Dept: OBGYN | Facility: CLINIC | Age: 37
End: 2022-04-13
Payer: COMMERCIAL

## 2022-04-13 ENCOUNTER — APPOINTMENT (OUTPATIENT)
Dept: ANTEPARTUM | Facility: CLINIC | Age: 37
End: 2022-04-13
Payer: COMMERCIAL

## 2022-04-13 VITALS
BODY MASS INDEX: 23.81 KG/M2 | HEIGHT: 64 IN | DIASTOLIC BLOOD PRESSURE: 70 MMHG | WEIGHT: 139.44 LBS | SYSTOLIC BLOOD PRESSURE: 105 MMHG | HEART RATE: 75 BPM

## 2022-04-13 PROCEDURE — 76819 FETAL BIOPHYS PROFIL W/O NST: CPT

## 2022-04-13 PROCEDURE — 90715 TDAP VACCINE 7 YRS/> IM: CPT

## 2022-04-13 PROCEDURE — 0502F SUBSEQUENT PRENATAL CARE: CPT

## 2022-04-13 PROCEDURE — 76816 OB US FOLLOW-UP PER FETUS: CPT

## 2022-04-13 PROCEDURE — 90471 IMMUNIZATION ADMIN: CPT

## 2022-04-27 ENCOUNTER — APPOINTMENT (OUTPATIENT)
Dept: PEDIATRICS | Facility: CLINIC | Age: 37
End: 2022-04-27
Payer: COMMERCIAL

## 2022-04-27 PROCEDURE — ZZZZZ: CPT

## 2022-04-28 ENCOUNTER — APPOINTMENT (OUTPATIENT)
Dept: OBGYN | Facility: CLINIC | Age: 37
End: 2022-04-28
Payer: COMMERCIAL

## 2022-04-28 VITALS
HEIGHT: 64 IN | BODY MASS INDEX: 23.9 KG/M2 | SYSTOLIC BLOOD PRESSURE: 115 MMHG | DIASTOLIC BLOOD PRESSURE: 73 MMHG | WEIGHT: 140 LBS

## 2022-04-28 PROCEDURE — 0502F SUBSEQUENT PRENATAL CARE: CPT

## 2022-05-04 ENCOUNTER — NON-APPOINTMENT (OUTPATIENT)
Age: 37
End: 2022-05-04

## 2022-05-06 ENCOUNTER — NON-APPOINTMENT (OUTPATIENT)
Age: 37
End: 2022-05-06

## 2022-05-09 ENCOUNTER — TRANSCRIPTION ENCOUNTER (OUTPATIENT)
Age: 37
End: 2022-05-09

## 2022-05-11 ENCOUNTER — APPOINTMENT (OUTPATIENT)
Dept: OBGYN | Facility: CLINIC | Age: 37
End: 2022-05-11
Payer: COMMERCIAL

## 2022-05-11 VITALS
DIASTOLIC BLOOD PRESSURE: 76 MMHG | HEIGHT: 64 IN | BODY MASS INDEX: 24.41 KG/M2 | WEIGHT: 143 LBS | HEART RATE: 80 BPM | SYSTOLIC BLOOD PRESSURE: 123 MMHG

## 2022-05-11 DIAGNOSIS — Z34.00 ENCOUNTER FOR SUPERVISION OF NORMAL FIRST PREGNANCY, UNSPECIFIED TRIMESTER: ICD-10-CM

## 2022-05-11 PROCEDURE — 0502F SUBSEQUENT PRENATAL CARE: CPT

## 2022-05-14 LAB — B-HEM STREP SPEC QL CULT: ABNORMAL

## 2022-05-15 ENCOUNTER — TRANSCRIPTION ENCOUNTER (OUTPATIENT)
Age: 37
End: 2022-05-15

## 2022-05-19 ENCOUNTER — APPOINTMENT (OUTPATIENT)
Dept: OBGYN | Facility: CLINIC | Age: 37
End: 2022-05-19

## 2022-05-19 VITALS
SYSTOLIC BLOOD PRESSURE: 118 MMHG | WEIGHT: 145 LBS | DIASTOLIC BLOOD PRESSURE: 74 MMHG | HEART RATE: 73 BPM | HEIGHT: 64 IN | BODY MASS INDEX: 24.75 KG/M2

## 2022-05-23 ENCOUNTER — TRANSCRIPTION ENCOUNTER (OUTPATIENT)
Age: 37
End: 2022-05-23

## 2022-05-24 ENCOUNTER — NON-APPOINTMENT (OUTPATIENT)
Age: 37
End: 2022-05-24

## 2022-05-26 ENCOUNTER — APPOINTMENT (OUTPATIENT)
Dept: OBGYN | Facility: CLINIC | Age: 37
End: 2022-05-26

## 2022-05-26 VITALS
DIASTOLIC BLOOD PRESSURE: 78 MMHG | WEIGHT: 145 LBS | BODY MASS INDEX: 24.75 KG/M2 | HEIGHT: 64 IN | SYSTOLIC BLOOD PRESSURE: 127 MMHG | HEART RATE: 73 BPM

## 2022-06-01 ENCOUNTER — APPOINTMENT (OUTPATIENT)
Dept: OBGYN | Facility: CLINIC | Age: 37
End: 2022-06-01

## 2022-06-01 VITALS
HEART RATE: 79 BPM | SYSTOLIC BLOOD PRESSURE: 126 MMHG | HEIGHT: 64 IN | DIASTOLIC BLOOD PRESSURE: 81 MMHG | BODY MASS INDEX: 25.1 KG/M2 | WEIGHT: 147 LBS

## 2022-06-01 DIAGNOSIS — O09.513 SUPERVISION OF ELDERLY PRIMIGRAVIDA, THIRD TRIMESTER: ICD-10-CM

## 2022-06-06 ENCOUNTER — ASOB RESULT (OUTPATIENT)
Age: 37
End: 2022-06-06

## 2022-06-06 ENCOUNTER — APPOINTMENT (OUTPATIENT)
Dept: ANTEPARTUM | Facility: CLINIC | Age: 37
End: 2022-06-06
Payer: COMMERCIAL

## 2022-06-06 ENCOUNTER — INPATIENT (INPATIENT)
Facility: HOSPITAL | Age: 37
LOS: 3 days | Discharge: HOME | End: 2022-06-10
Attending: OBSTETRICS & GYNECOLOGY | Admitting: OBSTETRICS & GYNECOLOGY
Payer: COMMERCIAL

## 2022-06-06 ENCOUNTER — TRANSCRIPTION ENCOUNTER (OUTPATIENT)
Age: 37
End: 2022-06-06

## 2022-06-06 VITALS — SYSTOLIC BLOOD PRESSURE: 124 MMHG | DIASTOLIC BLOOD PRESSURE: 76 MMHG | HEART RATE: 102 BPM

## 2022-06-06 DIAGNOSIS — O36.8390 MATERNAL CARE FOR ABNORMALITIES OF THE FETAL HEART RATE OR RHYTHM, UNSPECIFIED TRIMESTER, NOT APPLICABLE OR UNSPECIFIED: ICD-10-CM

## 2022-06-06 DIAGNOSIS — Z98.890 OTHER SPECIFIED POSTPROCEDURAL STATES: Chronic | ICD-10-CM

## 2022-06-06 LAB
BASOPHILS # BLD AUTO: 0.02 K/UL — SIGNIFICANT CHANGE UP (ref 0–0.2)
BASOPHILS NFR BLD AUTO: 0.3 % — SIGNIFICANT CHANGE UP (ref 0–2)
BLD GP AB SCN SERPL QL: NEGATIVE — SIGNIFICANT CHANGE UP
COVID-19 SPIKE DOMAIN AB INTERP: POSITIVE
COVID-19 SPIKE DOMAIN ANTIBODY RESULT: >250 U/ML — HIGH
EOSINOPHIL # BLD AUTO: 0.03 K/UL — SIGNIFICANT CHANGE UP (ref 0–0.5)
EOSINOPHIL NFR BLD AUTO: 0.4 % — SIGNIFICANT CHANGE UP (ref 0–6)
HCT VFR BLD CALC: 33.8 % — LOW (ref 34.5–45)
HGB BLD-MCNC: 12 G/DL — SIGNIFICANT CHANGE UP (ref 11.5–15.5)
IANC: 4.32 K/UL — SIGNIFICANT CHANGE UP (ref 1.8–7.4)
IMM GRANULOCYTES NFR BLD AUTO: 0.3 % — SIGNIFICANT CHANGE UP (ref 0–1.5)
LYMPHOCYTES # BLD AUTO: 2.26 K/UL — SIGNIFICANT CHANGE UP (ref 1–3.3)
LYMPHOCYTES # BLD AUTO: 31.2 % — SIGNIFICANT CHANGE UP (ref 13–44)
MCHC RBC-ENTMCNC: 31.7 PG — SIGNIFICANT CHANGE UP (ref 27–34)
MCHC RBC-ENTMCNC: 35.5 GM/DL — SIGNIFICANT CHANGE UP (ref 32–36)
MCV RBC AUTO: 89.4 FL — SIGNIFICANT CHANGE UP (ref 80–100)
MONOCYTES # BLD AUTO: 0.59 K/UL — SIGNIFICANT CHANGE UP (ref 0–0.9)
MONOCYTES NFR BLD AUTO: 8.1 % — SIGNIFICANT CHANGE UP (ref 2–14)
NEUTROPHILS # BLD AUTO: 4.32 K/UL — SIGNIFICANT CHANGE UP (ref 1.8–7.4)
NEUTROPHILS NFR BLD AUTO: 59.7 % — SIGNIFICANT CHANGE UP (ref 43–77)
NRBC # BLD: 0 /100 WBCS — SIGNIFICANT CHANGE UP
NRBC # FLD: 0 K/UL — SIGNIFICANT CHANGE UP
PLATELET # BLD AUTO: 206 K/UL — SIGNIFICANT CHANGE UP (ref 150–400)
RBC # BLD: 3.78 M/UL — LOW (ref 3.8–5.2)
RBC # FLD: 13 % — SIGNIFICANT CHANGE UP (ref 10.3–14.5)
RH IG SCN BLD-IMP: POSITIVE — SIGNIFICANT CHANGE UP
RH IG SCN BLD-IMP: POSITIVE — SIGNIFICANT CHANGE UP
SARS-COV-2 IGG+IGM SERPL QL IA: >250 U/ML — HIGH
SARS-COV-2 IGG+IGM SERPL QL IA: POSITIVE
SARS-COV-2 RNA SPEC QL NAA+PROBE: SIGNIFICANT CHANGE UP
T PALLIDUM AB TITR SER: NEGATIVE — SIGNIFICANT CHANGE UP
WBC # BLD: 7.24 K/UL — SIGNIFICANT CHANGE UP (ref 3.8–10.5)
WBC # FLD AUTO: 7.24 K/UL — SIGNIFICANT CHANGE UP (ref 3.8–10.5)

## 2022-06-06 PROCEDURE — 76818 FETAL BIOPHYS PROFILE W/NST: CPT

## 2022-06-06 PROCEDURE — 76816 OB US FOLLOW-UP PER FETUS: CPT

## 2022-06-06 RX ORDER — MORPHINE SULFATE 50 MG/1
4 CAPSULE, EXTENDED RELEASE ORAL ONCE
Refills: 0 | Status: DISCONTINUED | OUTPATIENT
Start: 2022-06-06 | End: 2022-06-06

## 2022-06-06 RX ORDER — ONDANSETRON 8 MG/1
4 TABLET, FILM COATED ORAL ONCE
Refills: 0 | Status: COMPLETED | OUTPATIENT
Start: 2022-06-06 | End: 2022-06-06

## 2022-06-06 RX ORDER — SODIUM CHLORIDE 9 MG/ML
300 INJECTION INTRAMUSCULAR; INTRAVENOUS; SUBCUTANEOUS ONCE
Refills: 0 | Status: COMPLETED | OUTPATIENT
Start: 2022-06-06 | End: 2022-06-06

## 2022-06-06 RX ORDER — ONDANSETRON 8 MG/1
4 TABLET, FILM COATED ORAL ONCE
Refills: 0 | Status: DISCONTINUED | OUTPATIENT
Start: 2022-06-06 | End: 2022-06-06

## 2022-06-06 RX ORDER — AMPICILLIN TRIHYDRATE 250 MG
1 CAPSULE ORAL EVERY 4 HOURS
Refills: 0 | Status: DISCONTINUED | OUTPATIENT
Start: 2022-06-06 | End: 2022-06-07

## 2022-06-06 RX ORDER — AMPICILLIN TRIHYDRATE 250 MG
2 CAPSULE ORAL ONCE
Refills: 0 | Status: COMPLETED | OUTPATIENT
Start: 2022-06-06 | End: 2022-06-06

## 2022-06-06 RX ORDER — CITRIC ACID/SODIUM CITRATE 300-500 MG
15 SOLUTION, ORAL ORAL EVERY 6 HOURS
Refills: 0 | Status: DISCONTINUED | OUTPATIENT
Start: 2022-06-06 | End: 2022-06-07

## 2022-06-06 RX ORDER — OXYTOCIN 10 UNIT/ML
333.33 VIAL (ML) INJECTION
Qty: 20 | Refills: 0 | Status: DISCONTINUED | OUTPATIENT
Start: 2022-06-06 | End: 2022-06-07

## 2022-06-06 RX ORDER — SODIUM CHLORIDE 9 MG/ML
1000 INJECTION, SOLUTION INTRAVENOUS
Refills: 0 | Status: DISCONTINUED | OUTPATIENT
Start: 2022-06-06 | End: 2022-06-07

## 2022-06-06 RX ADMIN — MORPHINE SULFATE 4 MILLIGRAM(S): 50 CAPSULE, EXTENDED RELEASE ORAL at 16:03

## 2022-06-06 RX ADMIN — SODIUM CHLORIDE 1200 MILLILITER(S): 9 INJECTION INTRAMUSCULAR; INTRAVENOUS; SUBCUTANEOUS at 22:48

## 2022-06-06 RX ADMIN — ONDANSETRON 4 MILLIGRAM(S): 8 TABLET, FILM COATED ORAL at 15:57

## 2022-06-06 RX ADMIN — Medication 216 GRAM(S): at 12:56

## 2022-06-06 RX ADMIN — Medication 108 GRAM(S): at 20:54

## 2022-06-06 RX ADMIN — Medication 108 GRAM(S): at 16:56

## 2022-06-06 RX ADMIN — MORPHINE SULFATE 4 MILLIGRAM(S): 50 CAPSULE, EXTENDED RELEASE ORAL at 17:10

## 2022-06-06 RX ADMIN — SODIUM CHLORIDE 125 MILLILITER(S): 9 INJECTION, SOLUTION INTRAVENOUS at 12:00

## 2022-06-06 NOTE — OB PROVIDER H&P - NSHPPHYSICALEXAM_GEN_ALL_CORE
VS:  Vital Signs Last 24 Hrs  T(C): 36.7 (06 Jun 2022 12:23), Max: 36.7 (06 Jun 2022 12:23)  T(F): 98.1 (06 Jun 2022 12:23), Max: 98.1 (06 Jun 2022 12:23)  HR: 102 (06 Jun 2022 12:23) (102 - 102)  BP: 124/76 (06 Jun 2022 12:23) (124/76 - 124/76)  BP(mean): --  RR: 16 (06 Jun 2022 12:23) (16 - 16)  SpO2: 98% (06 Jun 2022 12:23) (98% - 98%)  GA: NAD  Cards: RRR  Pulm: CTAB  EFH: 150/mod/+accels/-decels  [place]: irregular  VE: 1/0/-3  TAUS: vertex

## 2022-06-06 NOTE — OB PROVIDER H&P - NSPPHNORISK_OBGYN_ALL_OB
[facility] Hospitalist Progress Note    Date of Service: 2017    Chief Complaint  76 y.o. female admitted 2017 with AMS, UTI.     Interval Problem Update  No issues overnight  No new complaints  Remains confused     Consultants/Specialty  Neuro.     Disposition  Will initiate guardianship paperwork        Review of Systems   Unable to perform ROS: Dementia   Constitutional: Negative for fever.   Respiratory: Negative for cough.    Gastrointestinal: Negative for abdominal pain.   Neurological: Negative for headaches.   Psychiatric/Behavioral: Negative for depression.      Physical Exam  Laboratory/Imaging   Hemodynamics  Temp (24hrs), Av.7 °C (98.1 °F), Min:36.5 °C (97.7 °F), Max:37.2 °C (98.9 °F)   Temperature: 36.5 °C (97.7 °F)  Pulse  Av.3  Min: 52  Max: 88    Blood Pressure : (!) 167/68 (RN notified)      Respiratory      Respiration: 18, Pulse Oximetry: 90 %        RUL Breath Sounds: Clear, RML Breath Sounds: Diminished, RLL Breath Sounds: Diminished, ARGELIA Breath Sounds: Clear, LLL Breath Sounds: Diminished    Fluids    Intake/Output Summary (Last 24 hours) at 17 1122  Last data filed at 17 1800   Gross per 24 hour   Intake              220 ml   Output                0 ml   Net              220 ml       Nutrition  Orders Placed This Encounter   Procedures   • Diet Order     Standing Status:   Standing     Number of Occurrences:   1     Order Specific Question:   Diet:     Answer:   Regular [1]     Physical Exam   Constitutional: No distress.   HENT:   Head: Normocephalic and atraumatic.   Mouth/Throat: No oropharyngeal exudate.   Eyes: Conjunctivae are normal. Pupils are equal, round, and reactive to light.   Neck: Normal range of motion. Neck supple.   Cardiovascular: Normal rate and regular rhythm.    No murmur heard.  Pulmonary/Chest: Effort normal and breath sounds normal. No respiratory distress.   Abdominal: Soft. Bowel sounds are normal. She exhibits no distension.    Musculoskeletal: She exhibits no edema or tenderness.   Neurological: She is alert. She has normal strength. She is not disoriented. No cranial nerve deficit or sensory deficit. GCS eye subscore is 4. GCS verbal subscore is 5. GCS motor subscore is 6.   Skin: Skin is warm and dry. She is not diaphoretic. No erythema.   Psychiatric: She has a normal mood and affect. Her behavior is normal.   Nursing note and vitals reviewed.                               Assessment/Plan     HTN (hypertension)- (present on admission)   Assessment & Plan    Has a quite hypertensive over the past couple of days  Will increase hydralazine to 100 mg 3 times a day  Continue metoprolol 12.5 mg twice daily        UTI (urinary tract infection)- (present on admission)   Assessment & Plan    Cultures negative  Completed antibiotics with IV Rocephin        Acute renal insufficiency- (present on admission)   Assessment & Plan    Mild probably due to dehydration. Resolved now.         T12 compression fracture (CMS-HCC)- (present on admission)   Assessment & Plan    Stable, ct spine did not show acute changes.         History of CVA (cerebrovascular accident)- (present on admission)   Assessment & Plan    Continue ASA and statin.   9/23 MRI today show no new stroke, showed Prominent lateral and third ventricles without visualized obstructing lesion which could indicate normal pressure hydrocephalus, patient does not have urinary incontinence neither balance problem, PT/OT to see today.   9/24 MRI no new stroke, pt/ot to see.   9/25 pt/ot today.         Dementia- (present on admission)   Assessment & Plan    MRI of her brain showed enlarged ventricles but no other acute abnormalities  Neurology has evaluated the patient  Social work to assist with guardianship            Reviewed items::  Labs reviewed, Medications reviewed and Radiology images reviewed  Pink catheter::  No Pink  DVT prophylaxis pharmacological::  Heparin  Antibiotics:  Treating  active infection/contamination beyond 24 hours perioperative coverage         In my judgment no risk for PPH has been identified at this time.

## 2022-06-06 NOTE — OB RN PATIENT PROFILE - FALL HARM RISK - UNIVERSAL INTERVENTIONS
Bed in lowest position, wheels locked, appropriate side rails in place/Call bell, personal items and telephone in reach/Instruct patient to call for assistance before getting out of bed or chair/Non-slip footwear when patient is out of bed/[place] to call system/Physically safe environment - no spills, clutter or unnecessary equipment/Purposeful Proactive Rounding/Room/bathroom lighting operational, light cord in reach

## 2022-06-06 NOTE — OB PROVIDER H&P - ASSESSMENT
A&P: 36 year old  presents to L and D 2/2 prolonged decel in office. Patient has no complaints today  Labor: admit to L&D  -Buccal cytotec, cervical balloon  -Ampicillin for GBS prophylaxis  -routine labs  -EFM/toco  -NPO, IV hydration  Fetal: cat 1 tracing, fetal status reassuring  GBS: pos  Analgesia: PRN      d/w Dr. Tigre Coyle, PGY-1

## 2022-06-06 NOTE — OB PROVIDER H&P - HISTORY OF PRESENT ILLNESS
R1 H&P    Pt is a 35y/o  at 40+2 admitted for IOL 2/2 prolonged decel noted in OBGYN office. Patient has no complaints today. +FM, -LOF, -VB, +Cx (irregular)  Prenatal course uncomplicated  GBS pos  EFW 3300    OBHx: Primagravid  GynHx: Denies cysts, fibroids, abnormal paps, STIs  PMHx: Denies  PSHx: Foot surgery "many years ago"  Med: Denies  All: NKDA  SH: denies toxic habits x3, no depression, no anxiety

## 2022-06-07 LAB
BASOPHILS # BLD AUTO: 0.02 K/UL — SIGNIFICANT CHANGE UP (ref 0–0.2)
BASOPHILS NFR BLD AUTO: 0.2 % — SIGNIFICANT CHANGE UP (ref 0–2)
EOSINOPHIL # BLD AUTO: 0 K/UL — SIGNIFICANT CHANGE UP (ref 0–0.5)
EOSINOPHIL NFR BLD AUTO: 0 % — SIGNIFICANT CHANGE UP (ref 0–6)
HCT VFR BLD CALC: 31.3 % — LOW (ref 34.5–45)
HGB BLD-MCNC: 10.6 G/DL — LOW (ref 11.5–15.5)
IANC: 8.42 K/UL — HIGH (ref 1.8–7.4)
IMM GRANULOCYTES NFR BLD AUTO: 0.3 % — SIGNIFICANT CHANGE UP (ref 0–1.5)
LYMPHOCYTES # BLD AUTO: 1.3 K/UL — SIGNIFICANT CHANGE UP (ref 1–3.3)
LYMPHOCYTES # BLD AUTO: 12.3 % — LOW (ref 13–44)
MCHC RBC-ENTMCNC: 30.5 PG — SIGNIFICANT CHANGE UP (ref 27–34)
MCHC RBC-ENTMCNC: 33.9 GM/DL — SIGNIFICANT CHANGE UP (ref 32–36)
MCV RBC AUTO: 90.2 FL — SIGNIFICANT CHANGE UP (ref 80–100)
MONOCYTES # BLD AUTO: 0.76 K/UL — SIGNIFICANT CHANGE UP (ref 0–0.9)
MONOCYTES NFR BLD AUTO: 7.2 % — SIGNIFICANT CHANGE UP (ref 2–14)
NEUTROPHILS # BLD AUTO: 8.42 K/UL — HIGH (ref 1.8–7.4)
NEUTROPHILS NFR BLD AUTO: 80 % — HIGH (ref 43–77)
NRBC # BLD: 0 /100 WBCS — SIGNIFICANT CHANGE UP
NRBC # FLD: 0 K/UL — SIGNIFICANT CHANGE UP
PLATELET # BLD AUTO: 165 K/UL — SIGNIFICANT CHANGE UP (ref 150–400)
RBC # BLD: 3.47 M/UL — LOW (ref 3.8–5.2)
RBC # FLD: 13 % — SIGNIFICANT CHANGE UP (ref 10.3–14.5)
WBC # BLD: 10.53 K/UL — HIGH (ref 3.8–10.5)
WBC # FLD AUTO: 10.53 K/UL — HIGH (ref 3.8–10.5)

## 2022-06-07 PROCEDURE — 59515 CESAREAN DELIVERY: CPT

## 2022-06-07 DEVICE — SURGICEL NU-KNIT 6 X 9": Type: IMPLANTABLE DEVICE | Status: FUNCTIONAL

## 2022-06-07 RX ORDER — DIPHENHYDRAMINE HCL 50 MG
25 CAPSULE ORAL EVERY 6 HOURS
Refills: 0 | Status: DISCONTINUED | OUTPATIENT
Start: 2022-06-07 | End: 2022-06-10

## 2022-06-07 RX ORDER — SIMETHICONE 80 MG/1
80 TABLET, CHEWABLE ORAL EVERY 4 HOURS
Refills: 0 | Status: DISCONTINUED | OUTPATIENT
Start: 2022-06-07 | End: 2022-06-10

## 2022-06-07 RX ORDER — TETANUS TOXOID, REDUCED DIPHTHERIA TOXOID AND ACELLULAR PERTUSSIS VACCINE, ADSORBED 5; 2.5; 8; 8; 2.5 [IU]/.5ML; [IU]/.5ML; UG/.5ML; UG/.5ML; UG/.5ML
0.5 SUSPENSION INTRAMUSCULAR ONCE
Refills: 0 | Status: DISCONTINUED | OUTPATIENT
Start: 2022-06-07 | End: 2022-06-10

## 2022-06-07 RX ORDER — OXYCODONE HYDROCHLORIDE 5 MG/1
5 TABLET ORAL
Refills: 0 | Status: DISCONTINUED | OUTPATIENT
Start: 2022-06-07 | End: 2022-06-10

## 2022-06-07 RX ORDER — SODIUM CHLORIDE 9 MG/ML
1000 INJECTION, SOLUTION INTRAVENOUS
Refills: 0 | Status: DISCONTINUED | OUTPATIENT
Start: 2022-06-07 | End: 2022-06-07

## 2022-06-07 RX ORDER — ACETAMINOPHEN 500 MG
975 TABLET ORAL
Refills: 0 | Status: DISCONTINUED | OUTPATIENT
Start: 2022-06-07 | End: 2022-06-10

## 2022-06-07 RX ORDER — CITRIC ACID/SODIUM CITRATE 300-500 MG
30 SOLUTION, ORAL ORAL ONCE
Refills: 0 | Status: COMPLETED | OUTPATIENT
Start: 2022-06-07 | End: 2022-06-07

## 2022-06-07 RX ORDER — LANOLIN
1 OINTMENT (GRAM) TOPICAL EVERY 6 HOURS
Refills: 0 | Status: DISCONTINUED | OUTPATIENT
Start: 2022-06-07 | End: 2022-06-10

## 2022-06-07 RX ORDER — ONDANSETRON 8 MG/1
4 TABLET, FILM COATED ORAL EVERY 6 HOURS
Refills: 0 | Status: DISCONTINUED | OUTPATIENT
Start: 2022-06-07 | End: 2022-06-10

## 2022-06-07 RX ORDER — FAMOTIDINE 10 MG/ML
20 INJECTION INTRAVENOUS ONCE
Refills: 0 | Status: COMPLETED | OUTPATIENT
Start: 2022-06-07 | End: 2022-06-07

## 2022-06-07 RX ORDER — MAGNESIUM HYDROXIDE 400 MG/1
30 TABLET, CHEWABLE ORAL
Refills: 0 | Status: DISCONTINUED | OUTPATIENT
Start: 2022-06-07 | End: 2022-06-10

## 2022-06-07 RX ORDER — IBUPROFEN 200 MG
600 TABLET ORAL EVERY 6 HOURS
Refills: 0 | Status: COMPLETED | OUTPATIENT
Start: 2022-06-07 | End: 2023-05-06

## 2022-06-07 RX ORDER — OXYCODONE HYDROCHLORIDE 5 MG/1
5 TABLET ORAL ONCE
Refills: 0 | Status: DISCONTINUED | OUTPATIENT
Start: 2022-06-07 | End: 2022-06-10

## 2022-06-07 RX ORDER — HEPARIN SODIUM 5000 [USP'U]/ML
5000 INJECTION INTRAVENOUS; SUBCUTANEOUS EVERY 12 HOURS
Refills: 0 | Status: DISCONTINUED | OUTPATIENT
Start: 2022-06-07 | End: 2022-06-10

## 2022-06-07 RX ORDER — OXYTOCIN 10 UNIT/ML
333.33 VIAL (ML) INJECTION
Qty: 20 | Refills: 0 | Status: DISCONTINUED | OUTPATIENT
Start: 2022-06-07 | End: 2022-06-07

## 2022-06-07 RX ORDER — KETOROLAC TROMETHAMINE 30 MG/ML
30 SYRINGE (ML) INJECTION EVERY 6 HOURS
Refills: 0 | Status: DISCONTINUED | OUTPATIENT
Start: 2022-06-07 | End: 2022-06-08

## 2022-06-07 RX ADMIN — Medication 30 MILLIGRAM(S): at 17:55

## 2022-06-07 RX ADMIN — Medication 108 GRAM(S): at 01:04

## 2022-06-07 RX ADMIN — ONDANSETRON 4 MILLIGRAM(S): 8 TABLET, FILM COATED ORAL at 04:45

## 2022-06-07 RX ADMIN — HEPARIN SODIUM 5000 UNIT(S): 5000 INJECTION INTRAVENOUS; SUBCUTANEOUS at 17:42

## 2022-06-07 RX ADMIN — Medication 1000 MILLIUNIT(S)/MIN: at 04:09

## 2022-06-07 RX ADMIN — Medication 30 MILLIGRAM(S): at 10:05

## 2022-06-07 RX ADMIN — Medication 30 MILLILITER(S): at 01:52

## 2022-06-07 RX ADMIN — Medication 975 MILLIGRAM(S): at 06:20

## 2022-06-07 RX ADMIN — Medication 30 MILLIGRAM(S): at 09:55

## 2022-06-07 RX ADMIN — FAMOTIDINE 20 MILLIGRAM(S): 10 INJECTION INTRAVENOUS at 01:52

## 2022-06-07 RX ADMIN — Medication 30 MILLIGRAM(S): at 23:36

## 2022-06-07 RX ADMIN — Medication 30 MILLIGRAM(S): at 17:42

## 2022-06-07 RX ADMIN — ONDANSETRON 4 MILLIGRAM(S): 8 TABLET, FILM COATED ORAL at 12:07

## 2022-06-07 RX ADMIN — HEPARIN SODIUM 5000 UNIT(S): 5000 INJECTION INTRAVENOUS; SUBCUTANEOUS at 06:20

## 2022-06-07 RX ADMIN — SODIUM CHLORIDE 75 MILLILITER(S): 9 INJECTION, SOLUTION INTRAVENOUS at 04:09

## 2022-06-07 NOTE — LACTATION INITIAL EVALUATION - LACTATION INTERVENTIONS
initiate/review safe skin-to-skin/initiate/review hand expression/initiate/review techniques for position and latch/review techniques to increase milk supply/review techniques to manage sore nipples/engorgement/reviewed components of an effective feeding and at least 8 effective feedings per day required/reviewed importance of monitoring infant diapers, the breastfeeding log, and minimum output each day/reviewed risks of unnecessary formula supplementation/reviewed risks of artificial nipples/reviewed benefits and recommendations for rooming in/reviewed feeding on demand/by cue at least 8 times a day/reviewed indications of inadequate milk transfer that would require supplementation

## 2022-06-07 NOTE — OB PROVIDER LABOR PROGRESS NOTE - ASSESSMENT
Pt for IOL for Category 2 FHT, now recovered  Cervical balloon placed  Continue cytotec IOL
IOL for category two tracing with prolonged deceleration due to tetanic contraction  - Terbutaline x1 given  - ISE and IUPC given  J Nhan PGY4  d/w Dr. Landeros 
Intermittent Category 2 FHT  Will start amnioinfusion  
IOL for category 2 tracing now w/ continued category 2 tracing remote from delivery. Discussion had with patient and family about  Section. Family in agreement.   - Dr. Landeros to obtain  consent and delivery   JUNIOR Justin PGY4
IOL for category two tracing  - For epidural for pain control  - Continue ampicillin   - For pitocin if contractions space  J Nhan PGY4  d/w Dr. Landeros

## 2022-06-07 NOTE — OB RN DELIVERY SUMMARY - NSSELHIDDEN_OBGYN_ALL_OB_FT
[NS_DeliveryAttending1_OBGYN_ALL_OB_FT:[id number]==],[NS_DeliveryAssist1_OBGYN_ALL_OB_FT:[id number]],[NS_DeliveryRN_OBGYN_ALL_OB_FT:[id number]=]

## 2022-06-07 NOTE — LACTATION INITIAL EVALUATION - INTERVENTION OUTCOME
Mom motivated to continue breastfeeding but at this time is feeling some nausea and at times unable to properly position infant to maintain deep effective latch, partner present and helping mom with positioning infant, mom wants to continue trying infant less than 24 hours, safe skin to skin encouraged Lactation time will follow up, Primary nurse aware of findings./verbalizes understanding/needs met/Lactation team to follow up

## 2022-06-07 NOTE — OB PROVIDER DELIVERY SUMMARY - NSPROVIDERDELIVERYNOTE_OBGYN_ALL_OB_FT
Category two tracing remote from delivery. Viable male infant Apgars 9/9 3330g. Double layer closure. Left broad ligament hematoma. Surgicel powder used over hysterotomy.   QBL: 384  IVF 2000

## 2022-06-07 NOTE — CHART NOTE - NSCHARTNOTEFT_GEN_A_CORE
OB attg  PTA held for Category 2 fht    Pt feeling strong cts  , pos accels, early, late and occas variable decels  IUPC q 2-5min  VE 5-6/90/-3    Category 2 FHT  improved with left lateral positioning  Discussed C/s if decelerations persist  Will reassess at 0100

## 2022-06-07 NOTE — OB RN DELIVERY SUMMARY - NS_SEPSISRSKCALC_OBGYN_ALL_OB_FT
EOS calculated successfully. EOS Risk Factor: 0.5/1000 live births ([facility] national incidence); GA=40w3d; Temp=99.14; ROM=5.95; GBS='Positive'; Antibiotics='Broad spectrum antibiotics > 4 hrs prior to birth'

## 2022-06-07 NOTE — OB NEONATOLOGY/PEDIATRICIAN DELIVERY SUMMARY - NSPEDSNEONOTESA_OBGYN_ALL_OB_FT
Baby is a 40+3 wk GA Male born to a 35 y/o  mother via C/S for arrest of descent/NRFHT. Maternal history notable for AMA (on ASA). Prenatal history uncomplicated. Maternal BT B+. PNL neg, NR, and immune. GBS positive, treated with Amp x4. AROM at 20:27 on , clear fluids. Baby born vigorous and crying spontaneously. WDSS. Apgars 9/9. EOS 0.10. Mom plans to breastfeed, would like hepB vacciantion and circumcision for child. COVID status neg.     Physical Exam (Post-Delivery)  Gen: NAD; well-appearing  HEENT: NC/AT; anterior fontanelle open and flat; ears and nose clinically patent, normally set; no tags, no cleft palate appreciated  Skin: pink, warm, well-perfused, no rash  Resp: non-labored breathing  Abd: soft, NT/ND; no masses appreciated, umbilical cord with 3 vessels  Extremities: moving all extremities, no crepitus; hips negative O/B  MSK: no clavicular fracture appreciated  : Male Greg I; no abnormalities; anus patent  Back: no sacral dimple  Neuro: +howie, +babinski, grasp, good tone throughout

## 2022-06-07 NOTE — OB RN INTRAOPERATIVE NOTE - NSSELHIDDEN_OBGYN_ALL_OB_FT
[NS_DeliveryAttending1_OBGYN_ALL_OB_FT:[id number]],[NS_DeliveryAssist1_OBGYN_ALL_OB_FT:[id number]],[NS_DeliveryRN_OBGYN_ALL_OB_FT:[id number]=]

## 2022-06-07 NOTE — OB PROVIDER LABOR PROGRESS NOTE - NS_SUBJECTIVE/OBJECTIVE_OBGYN_ALL_OB_FT
Patient examined for cervical change due to requesting pain medications. CB out. SROM upon exam.
Pt  feeling occas rectal pressure
Patient examined for cervical change due to category two tracing
Patient examined for cervical change due to tetanic contractions with deceleration. Terbutaline x1 given. ISE and IUPC placed.
Pt comfortable, no c/o

## 2022-06-07 NOTE — OB PROVIDER LABOR PROGRESS NOTE - NS_OBIHIFHRDETAILS_OBGYN_ALL_OB_FT
150 mod fredrick + accelerations + late decelerations
120 mod fredrick +Accel + decelerations
140 mod fredrick +Acce + continuous decelerations
150, min-moderate variability,  occas late decels noted; variable decel
140, pos accels, no decels, moderate variability

## 2022-06-07 NOTE — LACTATION INITIAL EVALUATION - POTENTIAL FOR
ineffective breastfeeding/sore breast/s/sore nipples/knowledge deficit/latch on difficulty/low supply

## 2022-06-07 NOTE — OB RN DELIVERY SUMMARY - NS_LABORMEDS_OBGYN_ALL_OB
Patient reports some long-standing mild reflux symptoms which appear to be helped with Protonix that was started after her EGD/colonoscopy 3 months ago  She was noted with a significantly sized (approximately 7 cm) hiatal hernia on this EGD    - I had discussion with patient about hiatal hernia repair, we'll refer to thoracic surgery for further discussion, can decide about approach and indication for surgery versus continuing medical treatment    - Will continue Protonix for now    - Avoid NSAIDs    - I advised patient to let us know if she experiences any worsening acid reflux symptoms or any new symptoms such as difficulty swallowing, postprandial abdominal pain, etc   If she develops such symptoms, we can consider increasing her acid reducing regimen    -  Patient was explained about the lifestyle and dietary modifications  Advised to avoid fatty foods, chocolates, caffeine, alcohol and any other triggering foods  Avoid eating for at least 3 hours before going to bed 
Antibiotics

## 2022-06-07 NOTE — OB PROVIDER DELIVERY SUMMARY - NSSELHIDDEN_OBGYN_ALL_OB_FT
[NS_DeliveryAttending1_OBGYN_ALL_OB_FT:[id number]==],[NS_DeliveryAssist1_OBGYN_ALL_OB_FT:[id number]],[NS_DeliveryRN_OBGYN_ALL_OB_FT:[id number]=]

## 2022-06-08 ENCOUNTER — TRANSCRIPTION ENCOUNTER (OUTPATIENT)
Age: 37
End: 2022-06-08

## 2022-06-08 LAB
BASOPHILS # BLD AUTO: 0.02 K/UL — SIGNIFICANT CHANGE UP (ref 0–0.2)
BASOPHILS NFR BLD AUTO: 0.2 % — SIGNIFICANT CHANGE UP (ref 0–2)
EOSINOPHIL # BLD AUTO: 0.02 K/UL — SIGNIFICANT CHANGE UP (ref 0–0.5)
EOSINOPHIL NFR BLD AUTO: 0.2 % — SIGNIFICANT CHANGE UP (ref 0–6)
HCT VFR BLD CALC: 29.3 % — LOW (ref 34.5–45)
HGB BLD-MCNC: 9.7 G/DL — LOW (ref 11.5–15.5)
IANC: 8.02 K/UL — HIGH (ref 1.8–7.4)
IMM GRANULOCYTES NFR BLD AUTO: 0.3 % — SIGNIFICANT CHANGE UP (ref 0–1.5)
LYMPHOCYTES # BLD AUTO: 2.66 K/UL — SIGNIFICANT CHANGE UP (ref 1–3.3)
LYMPHOCYTES # BLD AUTO: 23.2 % — SIGNIFICANT CHANGE UP (ref 13–44)
MCHC RBC-ENTMCNC: 31.1 PG — SIGNIFICANT CHANGE UP (ref 27–34)
MCHC RBC-ENTMCNC: 33.1 GM/DL — SIGNIFICANT CHANGE UP (ref 32–36)
MCV RBC AUTO: 93.9 FL — SIGNIFICANT CHANGE UP (ref 80–100)
MONOCYTES # BLD AUTO: 0.69 K/UL — SIGNIFICANT CHANGE UP (ref 0–0.9)
MONOCYTES NFR BLD AUTO: 6 % — SIGNIFICANT CHANGE UP (ref 2–14)
NEUTROPHILS # BLD AUTO: 8.02 K/UL — HIGH (ref 1.8–7.4)
NEUTROPHILS NFR BLD AUTO: 70.1 % — SIGNIFICANT CHANGE UP (ref 43–77)
NRBC # BLD: 0 /100 WBCS — SIGNIFICANT CHANGE UP
NRBC # FLD: 0 K/UL — SIGNIFICANT CHANGE UP
PLATELET # BLD AUTO: 161 K/UL — SIGNIFICANT CHANGE UP (ref 150–400)
RBC # BLD: 3.12 M/UL — LOW (ref 3.8–5.2)
RBC # FLD: 13 % — SIGNIFICANT CHANGE UP (ref 10.3–14.5)
WBC # BLD: 11.45 K/UL — HIGH (ref 3.8–10.5)
WBC # FLD AUTO: 11.45 K/UL — HIGH (ref 3.8–10.5)

## 2022-06-08 RX ORDER — IBUPROFEN 200 MG
1 TABLET ORAL
Qty: 0 | Refills: 0 | DISCHARGE
Start: 2022-06-08

## 2022-06-08 RX ORDER — ASCORBIC ACID 60 MG
1 TABLET,CHEWABLE ORAL
Qty: 0 | Refills: 0 | DISCHARGE
Start: 2022-06-08

## 2022-06-08 RX ORDER — SENNA PLUS 8.6 MG/1
1 TABLET ORAL
Refills: 0 | Status: DISCONTINUED | OUTPATIENT
Start: 2022-06-08 | End: 2022-06-10

## 2022-06-08 RX ORDER — FERROUS SULFATE 325(65) MG
325 TABLET ORAL THREE TIMES A DAY
Refills: 0 | Status: DISCONTINUED | OUTPATIENT
Start: 2022-06-08 | End: 2022-06-10

## 2022-06-08 RX ORDER — FERROUS SULFATE 325(65) MG
1 TABLET ORAL
Qty: 0 | Refills: 0 | DISCHARGE
Start: 2022-06-08

## 2022-06-08 RX ORDER — IBUPROFEN 200 MG
600 TABLET ORAL EVERY 6 HOURS
Refills: 0 | Status: DISCONTINUED | OUTPATIENT
Start: 2022-06-08 | End: 2022-06-10

## 2022-06-08 RX ORDER — ACETAMINOPHEN 500 MG
3 TABLET ORAL
Qty: 0 | Refills: 0 | DISCHARGE
Start: 2022-06-08

## 2022-06-08 RX ORDER — ASCORBIC ACID 60 MG
500 TABLET,CHEWABLE ORAL DAILY
Refills: 0 | Status: DISCONTINUED | OUTPATIENT
Start: 2022-06-08 | End: 2022-06-10

## 2022-06-08 RX ADMIN — Medication 1 TABLET(S): at 18:23

## 2022-06-08 RX ADMIN — SENNA PLUS 1 TABLET(S): 8.6 TABLET ORAL at 13:30

## 2022-06-08 RX ADMIN — Medication 975 MILLIGRAM(S): at 09:49

## 2022-06-08 RX ADMIN — Medication 600 MILLIGRAM(S): at 13:23

## 2022-06-08 RX ADMIN — Medication 325 MILLIGRAM(S): at 13:29

## 2022-06-08 RX ADMIN — Medication 975 MILLIGRAM(S): at 21:46

## 2022-06-08 RX ADMIN — Medication 975 MILLIGRAM(S): at 10:20

## 2022-06-08 RX ADMIN — Medication 975 MILLIGRAM(S): at 03:30

## 2022-06-08 RX ADMIN — HEPARIN SODIUM 5000 UNIT(S): 5000 INJECTION INTRAVENOUS; SUBCUTANEOUS at 05:36

## 2022-06-08 RX ADMIN — Medication 30 MILLIGRAM(S): at 05:36

## 2022-06-08 RX ADMIN — Medication 30 MILLIGRAM(S): at 06:14

## 2022-06-08 RX ADMIN — SIMETHICONE 80 MILLIGRAM(S): 80 TABLET, CHEWABLE ORAL at 13:29

## 2022-06-08 RX ADMIN — SIMETHICONE 80 MILLIGRAM(S): 80 TABLET, CHEWABLE ORAL at 18:23

## 2022-06-08 RX ADMIN — Medication 600 MILLIGRAM(S): at 18:55

## 2022-06-08 RX ADMIN — Medication 500 MILLIGRAM(S): at 13:29

## 2022-06-08 RX ADMIN — Medication 325 MILLIGRAM(S): at 21:46

## 2022-06-08 RX ADMIN — HEPARIN SODIUM 5000 UNIT(S): 5000 INJECTION INTRAVENOUS; SUBCUTANEOUS at 18:22

## 2022-06-08 RX ADMIN — Medication 975 MILLIGRAM(S): at 04:01

## 2022-06-08 RX ADMIN — Medication 600 MILLIGRAM(S): at 18:23

## 2022-06-08 RX ADMIN — Medication 600 MILLIGRAM(S): at 13:29

## 2022-06-08 RX ADMIN — Medication 975 MILLIGRAM(S): at 07:00

## 2022-06-08 RX ADMIN — Medication 600 MILLIGRAM(S): at 23:40

## 2022-06-08 RX ADMIN — Medication 30 MILLIGRAM(S): at 00:15

## 2022-06-08 NOTE — DISCHARGE NOTE OB - CARE PROVIDER_API CALL
Yanna Landeros (MD)  Obstetrics and Gynecology  [facility] [place] [address]  Phone: [phone]  Fax: [phone]  Follow Up Time:

## 2022-06-08 NOTE — DISCHARGE NOTE OB - HOSPITAL COURSE
Pt admitted after having an abnormal FHT in the antepartum testing unit.  Induction of labor was started with cytotec and cervical balloon.  She underwent a primary C/S for persistent Category 2 FHT remote from delivery.  Viable male infant.  uncomplicated postop care

## 2022-06-08 NOTE — PROGRESS NOTE ADULT - ASSESSMENT
POST OP DAY  1  s/p   SECTION    SUBJECTIVE:  No complaints.     PAIN SCALE SCORE: [x] Refer to charted pain scores  THERAPY:       [    ] Spinal morphine        [ x ] Epidural morphine        [    ] IV PCA Hydromorphone 1 mg/ml    OBJECTIVE:  Appears comfortable  - Sedation Score:   [ x ] Alert   [  ] Drowsy    [  ] Arousable	  [  ] Asleep	[  ] Unresponsive  - Side Effects:	[ x ] None    [  ] Nausea      [  ] Pruritus     [  ] Weakness   [  ] Numbness      ASSESSMENT:  Ambulating, urinating, no neadache.  No complications.    PLAN:   [   ] Discontinue    [  ] Continue    [ x ] Change to prn Analgesics as per primary service.    DOCUMENTATION & VERIFICATION OF CURRENT MEDS [ x ] Done

## 2022-06-08 NOTE — DISCHARGE NOTE OB - NS MD DC FALL RISK RISK
For information on Fall & Injury Prevention, visit: [email or web address] OR  [email or web address] OR  https://www.cdc.gov/steadi/patient.html

## 2022-06-08 NOTE — DISCHARGE NOTE OB - PATIENT PORTAL LINK FT
You can access the FollowMyHealth Patient Portal offered by [facility] by registering at the following website: [email or web address]. By joining [facility]’s FollowMyHealth portal, you will also be able to view your health information using other applications (apps) compatible with our system.

## 2022-06-08 NOTE — DISCHARGE NOTE OB - MATERIALS PROVIDED
[facility] [place] Screening Program/  Immunization Record/Breastfeeding Log/Guide to Postpartum Care/[facility] Hearing Screen Program/Shaken Baby Prevention Handout/Birth Certificate Instructions/Tdap Vaccination (VIS Pub Date: 2012)

## 2022-06-08 NOTE — PROGRESS NOTE ADULT - ASSESSMENT
A/P: 35yo POD#1 s/p pLTCS 2/2 cat 2 tracing.  Patient is stable and doing well post-operatively.    - Continue regular diet.  - Increase ambulation.  - Continue motrin, tylenol, oxycodone PRN for pain control.    - F/u AM CBC    Thelma Coyle MD PGY1

## 2022-06-08 NOTE — DISCHARGE NOTE OB - MEDICATION SUMMARY - MEDICATIONS TO TAKE
I will START or STAY ON the medications listed below when I get home from the hospital:    acetaminophen 325 mg oral tablet  -- 3 tab(s) by mouth every 8 hours, As Needed  -- Indication: For  delivery    ibuprofen 600 mg oral tablet  -- 1 tab(s) by mouth every 6 hours, As Needed  -- Indication: For  delivery    ferrous sulfate 325 mg (65 mg elemental iron) oral tablet  -- 1 tab(s) by mouth 3 times a day  -- Indication: For  delivery    ascorbic acid 500 mg oral tablet  -- 1 tab(s) by mouth once a day  -- Indication: For  delivery   I will START or STAY ON the medications listed below when I get home from the hospital:    acetaminophen 325 mg oral tablet  -- 3 tab(s) by mouth every 8 hours, As Needed  -- Indication: For pain    ibuprofen 600 mg oral tablet  -- 1 tab(s) by mouth every 6 hours, As Needed  -- Indication: For pain    ferrous sulfate 325 mg (65 mg elemental iron) oral tablet  -- 1 tab(s) by mouth once a day  -- Indication: For supplements    ascorbic acid 500 mg oral tablet  -- 1 tab(s) by mouth once a day  -- Indication: For supplements

## 2022-06-08 NOTE — PROGRESS NOTE ADULT - SUBJECTIVE AND OBJECTIVE BOX
OB Progress Note:  Delivery, POD#1    S: 37yo POD#1 s/p pLTCS 2/2 cat 2 tracing. Her pain is well controlled. She is tolerating a regular diet and passing flatus. Denies N/V. Denies CP/SOB/lightheadedness/dizziness.   She is ambulating without difficulty.   Voiding spontaneously.     O:   Vital Signs Last 24 Hrs  T(C): 37.5 (2022 05:45), Max: 37.5 (2022 05:45)  T(F): 99.5 (2022 05:45), Max: 99.5 (2022 05:45)  HR: 74 (2022 05:45) (67 - 78)  BP: 99/62 (2022 05:45) (99/62 - 117/74)  BP(mean): --  RR: 17 (2022 05:45) (16 - 18)  SpO2: 97% (2022 05:45) (96% - 100%)    Labs:  Blood type: B Positive  Rubella IgG: RPR: Negative                          9.7<L>   11.45<H> >-----------< 161    (  @ 05:43 )             29.3<L>                        10.6<L>   10.53<H> >-----------< 165    (  @ 06:15 )             31.3<L>                        12.0   7.24 >-----------< 206    (  @ 12:00 )             33.8<L>          PE:  General: NAD  Abdomen: Mildly distended, appropriately tender, incision c/d/i.  Extremities: No erythema, no pitting edema

## 2022-06-09 RX ADMIN — Medication 975 MILLIGRAM(S): at 09:25

## 2022-06-09 RX ADMIN — Medication 600 MILLIGRAM(S): at 05:39

## 2022-06-09 RX ADMIN — Medication 325 MILLIGRAM(S): at 21:35

## 2022-06-09 RX ADMIN — Medication 975 MILLIGRAM(S): at 15:42

## 2022-06-09 RX ADMIN — Medication 325 MILLIGRAM(S): at 14:12

## 2022-06-09 RX ADMIN — Medication 600 MILLIGRAM(S): at 18:10

## 2022-06-09 RX ADMIN — HEPARIN SODIUM 5000 UNIT(S): 5000 INJECTION INTRAVENOUS; SUBCUTANEOUS at 18:10

## 2022-06-09 RX ADMIN — Medication 975 MILLIGRAM(S): at 09:55

## 2022-06-09 RX ADMIN — Medication 600 MILLIGRAM(S): at 18:54

## 2022-06-09 RX ADMIN — Medication 975 MILLIGRAM(S): at 22:15

## 2022-06-09 RX ADMIN — Medication 500 MILLIGRAM(S): at 11:53

## 2022-06-09 RX ADMIN — Medication 975 MILLIGRAM(S): at 15:12

## 2022-06-09 RX ADMIN — Medication 975 MILLIGRAM(S): at 21:33

## 2022-06-09 RX ADMIN — Medication 1 TABLET(S): at 11:53

## 2022-06-09 RX ADMIN — HEPARIN SODIUM 5000 UNIT(S): 5000 INJECTION INTRAVENOUS; SUBCUTANEOUS at 05:39

## 2022-06-09 RX ADMIN — Medication 600 MILLIGRAM(S): at 11:53

## 2022-06-09 RX ADMIN — Medication 600 MILLIGRAM(S): at 12:30

## 2022-06-09 NOTE — PROGRESS NOTE ADULT - SUBJECTIVE AND OBJECTIVE BOX
S: 35yo POD#2 s/p pLTCS with category 2 tracing. Pain is well controlled. She is tolerating a regular diet and passing flatus. She is voiding spontaneously, and ambulating without difficulty. Denies CP/SOB. Denies lightheadedness/dizziness. Denies N/V.        O:  Vitals:  Vital Signs Last 24 Hrs  T(C): 36.6 (09 Jun 2022 05:54), Max: 37 (08 Jun 2022 14:11)  T(F): 97.8 (09 Jun 2022 05:54), Max: 98.6 (08 Jun 2022 14:11)  HR: 56 (09 Jun 2022 05:54) (56 - 83)  BP: 110/71 (09 Jun 2022 05:54) (107/79 - 114/88)  BP(mean): --  RR: 16 (09 Jun 2022 05:54) (16 - 17)  SpO2: 97% (09 Jun 2022 05:54) (97% - 100%)    MEDICATIONS  (STANDING):  acetaminophen     Tablet .. 975 milliGRAM(s) Oral <User Schedule>  ascorbic acid 500 milliGRAM(s) Oral daily  diphtheria/tetanus/pertussis (acellular) Vaccine (ADAcel) 0.5 milliLiter(s) IntraMuscular once  ferrous    sulfate 325 milliGRAM(s) Oral three times a day  heparin   Injectable 5000 Unit(s) SubCutaneous every 12 hours  ibuprofen  Tablet. 600 milliGRAM(s) Oral every 6 hours  prenatal multivitamin 1 Tablet(s) Oral daily      MEDICATIONS  (PRN):  diphenhydrAMINE 25 milliGRAM(s) Oral every 6 hours PRN Pruritus  lanolin Ointment 1 Application(s) Topical every 6 hours PRN Sore Nipples  magnesium hydroxide Suspension 30 milliLiter(s) Oral two times a day PRN Constipation  ondansetron Injectable 4 milliGRAM(s) IV Push every 6 hours PRN Nausea  oxyCODONE    IR 5 milliGRAM(s) Oral every 3 hours PRN Moderate to Severe Pain (4-10)  oxyCODONE    IR 5 milliGRAM(s) Oral once PRN Moderate to Severe Pain (4-10)  senna 1 Tablet(s) Oral two times a day PRN Constipation  simethicone 80 milliGRAM(s) Chew every 4 hours PRN Gas      Labs:  Blood type: B Positive  Rubella IgG: RPR: Negative                          9.7<L>   11.45<H> >-----------< 161    ( 06-08 @ 05:43 )             29.3<L>                        10.6<L>   10.53<H> >-----------< 165    ( 06-07 @ 06:15 )             31.3<L>                        12.0   7.24 >-----------< 206    ( 06-06 @ 12:00 )             33.8<L>      PE:  General: NAD  Abdomen: Soft, appropriately tender, incision c/d/i.  Lochia: WNL  Extremities: No calf tenderness    A/P: 35yo POD#2 s/p pLTCS.  Patient is stable and doing well post-operatively.    - Continue regular diet.  - Increase ambulation.  - Encouraged use of abdominal binder.  - Continue motrin, tylenol, oxycodone PRN for pain control.    - Discharge planning.    Erica HORTON S: 35yo POD#2 s/p pLTCS with category 2 tracing. Pain is well controlled. She is tolerating a regular diet and passing flatus. She is voiding spontaneously, and ambulating without difficulty. Denies CP/SOB. Denies lightheadedness/dizziness. Denies N/V.        O:  Vitals:  Vital Signs Last 24 Hrs  T(C): 36.6 (09 Jun 2022 05:54), Max: 37 (08 Jun 2022 14:11)  T(F): 97.8 (09 Jun 2022 05:54), Max: 98.6 (08 Jun 2022 14:11)  HR: 56 (09 Jun 2022 05:54) (56 - 83)  BP: 110/71 (09 Jun 2022 05:54) (107/79 - 114/88)  BP(mean): --  RR: 16 (09 Jun 2022 05:54) (16 - 17)  SpO2: 97% (09 Jun 2022 05:54) (97% - 100%)    MEDICATIONS  (STANDING):  acetaminophen     Tablet .. 975 milliGRAM(s) Oral <User Schedule>  ascorbic acid 500 milliGRAM(s) Oral daily  diphtheria/tetanus/pertussis (acellular) Vaccine (ADAcel) 0.5 milliLiter(s) IntraMuscular once  ferrous    sulfate 325 milliGRAM(s) Oral three times a day  heparin   Injectable 5000 Unit(s) SubCutaneous every 12 hours  ibuprofen  Tablet. 600 milliGRAM(s) Oral every 6 hours  prenatal multivitamin 1 Tablet(s) Oral daily      MEDICATIONS  (PRN):  diphenhydrAMINE 25 milliGRAM(s) Oral every 6 hours PRN Pruritus  lanolin Ointment 1 Application(s) Topical every 6 hours PRN Sore Nipples  magnesium hydroxide Suspension 30 milliLiter(s) Oral two times a day PRN Constipation  ondansetron Injectable 4 milliGRAM(s) IV Push every 6 hours PRN Nausea  oxyCODONE    IR 5 milliGRAM(s) Oral every 3 hours PRN Moderate to Severe Pain (4-10)  oxyCODONE    IR 5 milliGRAM(s) Oral once PRN Moderate to Severe Pain (4-10)  senna 1 Tablet(s) Oral two times a day PRN Constipation  simethicone 80 milliGRAM(s) Chew every 4 hours PRN Gas      Labs:  Blood type: B Positive  Rubella IgG: RPR: Negative                          9.7<L>   11.45<H> >-----------< 161    ( 06-08 @ 05:43 )             29.3<L>                        10.6<L>   10.53<H> >-----------< 165    ( 06-07 @ 06:15 )             31.3<L>                        12.0   7.24 >-----------< 206    ( 06-06 @ 12:00 )             33.8<L>      PE:  General: NAD  Abdomen: Soft, appropriately tender, incision c/d/i. Small area of erythema noted on left side below the incisional area.  Lochia: WNL  Extremities: No calf tenderness    A/P: 35yo POD#2 s/p pLTCS.  Patient is stable and doing well post-operatively.    - Continue regular diet.  - Increase ambulation.  - Encouraged use of abdominal binder.  - Continue motrin, tylenol, oxycodone PRN for pain control.    - Discharge planning.    Erica HORTON

## 2022-06-10 VITALS
RESPIRATION RATE: 18 BRPM | TEMPERATURE: 99 F | OXYGEN SATURATION: 100 % | SYSTOLIC BLOOD PRESSURE: 127 MMHG | DIASTOLIC BLOOD PRESSURE: 87 MMHG | HEART RATE: 69 BPM

## 2022-06-10 RX ADMIN — Medication 975 MILLIGRAM(S): at 04:10

## 2022-06-10 RX ADMIN — Medication 600 MILLIGRAM(S): at 08:38

## 2022-06-10 RX ADMIN — Medication 975 MILLIGRAM(S): at 03:19

## 2022-06-10 RX ADMIN — Medication 600 MILLIGRAM(S): at 09:12

## 2022-06-10 RX ADMIN — Medication 325 MILLIGRAM(S): at 08:39

## 2022-06-10 NOTE — PROGRESS NOTE ADULT - SUBJECTIVE AND OBJECTIVE BOX
Patient seen and examined at bedside, resting comfortably in no acute distress. Denies fever, chills, nausea or vomiting. She is tolerating a regular diet. She is ambulating without difficulty and voiding spontaneously. Pain is well controlled. Bleeding is less than a normal menses. Reports passing gas and has had a bowel movement.    Physical Examination:  Vital Signs: Vital Signs Last 24 Hrs  T(C): 36.8 (10 Dharmesh 2022 05:52), Max: 36.9 (10 Dharmesh 2022 01:41)  T(F): 98.2 (10 Dharmesh 2022 05:52), Max: 98.4 (10 Dharmesh 2022 01:41)  HR: 61 (10 Dharmesh 2022 05:52) (61 - 63)  BP: 120/81 (10 Dharmesh 2022 05:52) (108/72 - 124/81)  BP(mean): --  RR: 17 (10 Dharmesh 2022 05:52) (16 - 17)  SpO2: 99% (10 Dharmesh 2022 05:52) (99% - 100%)  General: alert and oriented, no acute distress  Cardio: regular rate and rhythm  Respiratory: non labored respirations  Abdomen: soft, non-tender, non-distended; bowel sounds present; uterus firm, palpated below the umbilicus; incision clean, dry and intact, surrounding skin non erythematous  VE: minimal lochia noted  Musculoskeletal: No erythema/edema, no calf tenderness bilaterally    MEDICATIONS  (STANDING):  acetaminophen     Tablet .. 975 milliGRAM(s) Oral <User Schedule>  ascorbic acid 500 milliGRAM(s) Oral daily  diphtheria/tetanus/pertussis (acellular) Vaccine (ADAcel) 0.5 milliLiter(s) IntraMuscular once  ferrous    sulfate 325 milliGRAM(s) Oral three times a day  heparin   Injectable 5000 Unit(s) SubCutaneous every 12 hours  ibuprofen  Tablet. 600 milliGRAM(s) Oral every 6 hours  prenatal multivitamin 1 Tablet(s) Oral daily      Labs:  Blood type: B Positive  Rubella IgG: RPR: Negative                          9.7<L>   11.45<H> >-----------< 161    ( 06-08 @ 05:43 )             29.3<L>    Assessment and Plan:   35yo P1 s/p pLTCS on 6/7 for cat 2 now POD#3.  Patient is stable and doing well post-partum.   Blood type: B+    Plan:  - VS per unit protocol  - SCDs for DVT ppx  - Regular diet  - Pain well controlled, continue current pain regimen  - Encourage ambulation  - Continue wound care  - Baby boy s/p circ  - Discharge instructions reviewed  - D/c planning initiated, patient to follow up in office in 6 weeks for post partum visit    Jaxson Casanova MD

## 2022-06-16 ENCOUNTER — NON-APPOINTMENT (OUTPATIENT)
Age: 37
End: 2022-06-16

## 2022-06-17 ENCOUNTER — NON-APPOINTMENT (OUTPATIENT)
Age: 37
End: 2022-06-17

## 2022-06-22 ENCOUNTER — NON-APPOINTMENT (OUTPATIENT)
Age: 37
End: 2022-06-22

## 2022-06-22 ENCOUNTER — APPOINTMENT (OUTPATIENT)
Dept: OPHTHALMOLOGY | Facility: CLINIC | Age: 37
End: 2022-06-22
Payer: COMMERCIAL

## 2022-06-22 PROCEDURE — 92004 COMPRE OPH EXAM NEW PT 1/>: CPT

## 2022-06-23 NOTE — OB RN INTRAOPERATIVE NOTE - NS_DELIVERYANESTHES_OBGYN_ALL_OB_FT
PEGGY Ruvalcaba MD Cyclosporine Pregnancy And Lactation Text: This medication is Pregnancy Category C and it isn't know if it is safe during pregnancy. This medication is excreted in breast milk.

## 2022-06-24 ENCOUNTER — NON-APPOINTMENT (OUTPATIENT)
Age: 37
End: 2022-06-24

## 2022-07-21 ENCOUNTER — APPOINTMENT (OUTPATIENT)
Dept: OBGYN | Facility: CLINIC | Age: 37
End: 2022-07-21

## 2022-07-21 VITALS
WEIGHT: 128 LBS | BODY MASS INDEX: 21.85 KG/M2 | HEIGHT: 64 IN | DIASTOLIC BLOOD PRESSURE: 84 MMHG | SYSTOLIC BLOOD PRESSURE: 133 MMHG | HEART RATE: 66 BPM

## 2022-07-21 PROCEDURE — 0503F POSTPARTUM CARE VISIT: CPT

## 2022-07-24 LAB
BASOPHILS # BLD AUTO: 0.02 K/UL
BASOPHILS NFR BLD AUTO: 0.3 %
EOSINOPHIL # BLD AUTO: 0.09 K/UL
EOSINOPHIL NFR BLD AUTO: 1.4 %
HCT VFR BLD CALC: 39.4 %
HGB BLD-MCNC: 13.1 G/DL
IMM GRANULOCYTES NFR BLD AUTO: 0.2 %
LYMPHOCYTES # BLD AUTO: 2.89 K/UL
LYMPHOCYTES NFR BLD AUTO: 45.9 %
MAN DIFF?: NORMAL
MCHC RBC-ENTMCNC: 30.5 PG
MCHC RBC-ENTMCNC: 33.2 GM/DL
MCV RBC AUTO: 91.8 FL
MONOCYTES # BLD AUTO: 0.53 K/UL
MONOCYTES NFR BLD AUTO: 8.4 %
NEUTROPHILS # BLD AUTO: 2.75 K/UL
NEUTROPHILS NFR BLD AUTO: 43.8 %
PLATELET # BLD AUTO: 301 K/UL
RBC # BLD: 4.29 M/UL
RBC # FLD: 12.9 %
WBC # FLD AUTO: 6.29 K/UL

## 2022-07-24 NOTE — HISTORY OF PRESENT ILLNESS
[Delivery Date: ___] : on [unfilled] [Primary C/S] : delivered by  section [Male] : Delivery History: baby boy [Wt. ___] : weighing [unfilled] [Pertussis Vaccine] : Pertussis vaccine administered [Boy] : baby is a boy [Infant's Name ___] : [unfilled] [___ Lbs] : [unfilled] lbs [Circumcised] : circumcised [Living at Home] : is currently living at home [Breastfeeding] : currently nursing [Intended Contraception] : Intended Contraception: [Rhogam] : Rhogam was not administered [Rubella Vaccine] : Rubella vaccine was not administered [BTL] : no tubal ligation [BF with Difficulty] : nursing without difficulty [Resumed Menses] : has not resumed her menses [Resumed [place]] : has not resumed intercourse [Oral Contraceptives] : oral contraceptives [Breast Pain] : no breast pain [S/Sx PP Depression] : no signs/symptoms of postpartum depression [Clean/Dry/Intact] : clean, dry and intact [Healed] : healed [None] : no vaginal bleeding [Normal] : the vagina was normal [Examination Of The Breasts] : breasts are normal [Soft] : soft [Tender] : non tender [Distended] : not distended [Doing Well] : is doing well

## 2022-07-25 ENCOUNTER — APPOINTMENT (OUTPATIENT)
Dept: OPHTHALMOLOGY | Facility: CLINIC | Age: 37
End: 2022-07-25

## 2022-07-25 ENCOUNTER — NON-APPOINTMENT (OUTPATIENT)
Age: 37
End: 2022-07-25

## 2022-07-25 PROCEDURE — 92014 COMPRE OPH EXAM EST PT 1/>: CPT

## 2022-07-26 ENCOUNTER — RESULT REVIEW (OUTPATIENT)
Age: 37
End: 2022-07-26

## 2022-07-26 ENCOUNTER — APPOINTMENT (OUTPATIENT)
Dept: MRI IMAGING | Facility: CLINIC | Age: 37
End: 2022-07-26
Payer: COMMERCIAL

## 2022-07-26 PROCEDURE — 70543 MRI ORBT/FAC/NCK W/O &W/DYE: CPT

## 2022-07-26 PROCEDURE — 70553 MRI BRAIN STEM W/O & W/DYE: CPT

## 2022-07-26 PROCEDURE — A9585: CPT | Mod: JW

## 2022-07-26 PROCEDURE — A9585: CPT

## 2022-08-01 ENCOUNTER — APPOINTMENT (OUTPATIENT)
Dept: NEUROSURGERY | Facility: CLINIC | Age: 37
End: 2022-08-01

## 2022-08-01 PROCEDURE — 99204 OFFICE O/P NEW MOD 45 MIN: CPT

## 2022-08-01 NOTE — HISTORY OF PRESENT ILLNESS
[de-identified] : 36 year old female initially presented with new onset diplopia to Dr. Clemons. She is s/p induced vaginal delivery in early June. 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

## 2022-08-01 NOTE — DATA REVIEWED
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

## 2022-08-01 NOTE — ASSESSMENT
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   The patient’s established problem of diplopia  is secondary to mass effect from this lesion.  The differential diagnosis is meningioma. . I had a long discussion with the patient regarding the role of surgical resection .  The patient was extensively educated about the nature of her disease process. Therapeutic and diagnostic tests include preop testing.  . I have explained the alternatives, risks and benefits to the patient and she understands and agrees to proceed.  This risk of the procedure including but not limited to pain, infection, seizure, stroke, recurrence, residual disease, neurovascular injury, heart attack, pulmonary embolism, blindness, weakness, paralysis and death have been carefully explained to the patient who clearly understands and agrees to proceed.\par

## 2022-08-02 ENCOUNTER — TRANSCRIPTION ENCOUNTER (OUTPATIENT)
Age: 37
End: 2022-08-02

## 2022-08-05 ENCOUNTER — OUTPATIENT (OUTPATIENT)
Dept: OUTPATIENT SERVICES | Facility: HOSPITAL | Age: 37
LOS: 1 days | End: 2022-08-05

## 2022-08-05 ENCOUNTER — RESULT REVIEW (OUTPATIENT)
Age: 37
End: 2022-08-05

## 2022-08-05 ENCOUNTER — APPOINTMENT (OUTPATIENT)
Dept: NEUROSURGERY | Facility: CLINIC | Age: 37
End: 2022-08-05

## 2022-08-05 VITALS
DIASTOLIC BLOOD PRESSURE: 85 MMHG | BODY MASS INDEX: 21.85 KG/M2 | HEART RATE: 82 BPM | HEIGHT: 64 IN | OXYGEN SATURATION: 100 % | WEIGHT: 128 LBS | TEMPERATURE: 208.4 F | SYSTOLIC BLOOD PRESSURE: 126 MMHG

## 2022-08-05 DIAGNOSIS — Z98.890 OTHER SPECIFIED POSTPROCEDURAL STATES: Chronic | ICD-10-CM

## 2022-08-05 DIAGNOSIS — D32.0 BENIGN NEOPLASM OF CEREBRAL MENINGES: ICD-10-CM

## 2022-08-05 PROCEDURE — 99215 OFFICE O/P EST HI 40 MIN: CPT

## 2022-08-05 PROCEDURE — 70551 MRI BRAIN STEM W/O DYE: CPT

## 2022-08-05 PROCEDURE — 70551 MRI BRAIN STEM W/O DYE: CPT | Mod: 26

## 2022-08-05 NOTE — REVIEW OF SYSTEMS
[Eyesight Problems] : eyesight problems [As Noted in HPI] : as noted in HPI [Loss Of Hearing] : hearing loss [Negative] : Heme/Lymph

## 2022-08-08 NOTE — ASSESSMENT
[FreeTextEntry1] : Impression: 36yr old female with chief complaint of diplopia and recent hearing loss- underwent mri brain which noted - Right CPA cistern homogeneously enhancing mass with brainstem compression extending anteriorly into the cavernous sinus likely representing a meningioma\par \par Plan:\par Discussed this lesion is the cause of her diplopia and hearing loss\par On Physcial exam she has no symptoms of brain stem compression\par Recommend right craniotomy and surgical resection of this lesion. Reviewed risks and benefits of surgery with patient and spouse in great detail and they wish to proceed. \par Refer to ENT Dr. Bowens to formally eval lower cranial nerves and augio gram pre op

## 2022-08-08 NOTE — RESULTS/DATA
[FreeTextEntry1] : EXAM: [id number] - MR ORBITS ONLY WAWIC - ORDERED BY: MARY RICHARDSON\par \par EXAM: [id number] - MR BRAIN WAW IC - ORDERED BY: MARY RICHARDSON\par \par \par *** ADDENDUM ***\par \par Findings were discussed with Dr. Richardson on July 29, 2022 at 8:56 AM with read back.\par \par --- End of Report ---\par \par *** END OF ADDENDUM ***\par \par \par PROCEDURE DATE: 07/26/2022\par \par \par \par INTERPRETATION: INDICATIONS: Double vision\par \par TECHNIQUE: Multiplanar imaging of the brain was performed using T1 weighted, T2 weighted and FLAIR sequences. Diffusion weighted and susceptibility sensitive images were also obtained. Multiplanar images of the orbits were obtained using T1-weighted T2-weighted and fat-suppressed sequences. Following intravenous gadolinium, multiplanar T1 weighted images were performed. 6 cc Gadavist were administered. 1.5 cc were discarded.\par \par COMPARISON EXAMINATION: None.\par \par FINDINGS:\par VENTRICLES AND SULCI: Normal in size and configuration. A cavum septum pellucidum and vergae is seen, a normal variant.\par INTRA-AXIAL: Small scattered foci of white matter T2 hyperintensity are seen, nonspecific, which may represent microvascular-type changes. No abnormal intraparenchymal enhancement is seen.\par EXTRA-AXIAL: A homogeneously enhancing mass is seen along the medial margin of the right petrous bone extending anteriorly along the clivus and infiltrating the CPA cistern and prepontine cistern. The mass is intermediate to bright on T2. There is associated compression of the vilma and mild effacement of the fourth ventricle. The nodular component of the mass in this region measures 3.3 x 1.9 x 2.9 cm. The mass abuts the basilar artery and linear enhancement extends into the IAC suspicious for tumor extension. The mass is inseparable from the preganglionic segment of the trigeminal nerve. There is tumor extension into Meckel's cave and the cavernous sinus on the right with mild anterior displacement of the cavernous carotid artery. Abnormal signal is seen within the clivus and petrous apex suggesting bone involvement.\par VISUALIZED SINUSES: Normal.\par VISUALIZED MASTOIDS: Clear.\par CALVARIUM: See above. Otherwise unremarkable.\par CAROTID FLOW VOIDS: Present.\par GLOBES: Normal.\par OPTIC NERVES: Normal.\par LACRIMAL GLANDS: Normal.\par EXTRAOCULAR MUSCLES: Normal.\par REMAINING RETROBULBAR SPACE: Normal.\par MISCELLANEOUS: None.\par \par IMPRESSION: Right CPA cistern homogeneously enhancing mass with brainstem compression extending anteriorly into the cavernous sinus likely representing a meningioma. Additional areas of presumed local spread as described above.\par \par --- End of Report ---\par \par \par ***Please see the addendum at the top of this report. It may contain additional important information or changes.****\par

## 2022-08-08 NOTE — HISTORY OF PRESENT ILLNESS
[de-identified] : Rosie Akhtar is a 36 year old female here for a new patient visit. She  initially presented with new onset diplopia which persists. ALso endorses recent hearing loss.  SHe underwent MR imaging which noted a Right CPA cistern homogeneously enhancing mass with brainstem compression extending anteriorly into the cavernous sinus likely representing a meningioma.  Today she presents feeling well despite the hearing loss and double vision.  TO note had recent neuro optho eval done.

## 2022-08-08 NOTE — PHYSICAL EXAM
[General Appearance - Alert] : alert [General Appearance - In No Acute Distress] : in no acute distress [Person] : oriented to person [Place] : oriented to place [Time] : oriented to time [Cranial Nerves Trigeminal (V)] : facial sensation intact symmetrically [Cranial Nerves Facial (VII)] : face symmetrical [Cranial Nerves Glossopharyngeal (IX)] : tongue and palate midline [Cranial Nerves Accessory (XI - Cranial And Spinal)] : head turning and shoulder shrug symmetric [Cranial Nerves Hypoglossal (XII)] : there was no tongue deviation with protrusion [Motor Strength] : muscle strength was normal in all four extremities [Involuntary Movements] : no involuntary movements were seen [Balance] : balance was intact [] : no respiratory distress [Abnormal Walk] : normal gait [FreeTextEntry5] : partial sixth nerve palsy

## 2022-08-22 ENCOUNTER — OUTPATIENT (OUTPATIENT)
Dept: OUTPATIENT SERVICES | Facility: HOSPITAL | Age: 37
LOS: 1 days | End: 2022-08-22
Payer: COMMERCIAL

## 2022-08-22 VITALS
HEIGHT: 64 IN | OXYGEN SATURATION: 98 % | TEMPERATURE: 98 F | RESPIRATION RATE: 16 BRPM | SYSTOLIC BLOOD PRESSURE: 123 MMHG | WEIGHT: 125.88 LBS | HEART RATE: 65 BPM | DIASTOLIC BLOOD PRESSURE: 82 MMHG

## 2022-08-22 DIAGNOSIS — Z98.890 OTHER SPECIFIED POSTPROCEDURAL STATES: Chronic | ICD-10-CM

## 2022-08-22 DIAGNOSIS — Z01.818 ENCOUNTER FOR OTHER PREPROCEDURAL EXAMINATION: ICD-10-CM

## 2022-08-22 DIAGNOSIS — Z98.891 HISTORY OF UTERINE SCAR FROM PREVIOUS SURGERY: Chronic | ICD-10-CM

## 2022-08-22 DIAGNOSIS — D32.0 BENIGN NEOPLASM OF CEREBRAL MENINGES: ICD-10-CM

## 2022-08-22 LAB
ANION GAP SERPL CALC-SCNC: 11 MMOL/L — SIGNIFICANT CHANGE UP (ref 5–17)
BLD GP AB SCN SERPL QL: NEGATIVE — SIGNIFICANT CHANGE UP
BUN SERPL-MCNC: 16 MG/DL — SIGNIFICANT CHANGE UP (ref 7–23)
CALCIUM SERPL-MCNC: 9.8 MG/DL — SIGNIFICANT CHANGE UP (ref 8.4–10.5)
CHLORIDE SERPL-SCNC: 106 MMOL/L — SIGNIFICANT CHANGE UP (ref 96–108)
CO2 SERPL-SCNC: 23 MMOL/L — SIGNIFICANT CHANGE UP (ref 22–31)
CREAT SERPL-MCNC: 0.55 MG/DL — SIGNIFICANT CHANGE UP (ref 0.5–1.3)
EGFR: 121 ML/MIN/1.73M2 — SIGNIFICANT CHANGE UP
GLUCOSE SERPL-MCNC: 105 MG/DL — HIGH (ref 70–99)
HCT VFR BLD CALC: 38.7 % — SIGNIFICANT CHANGE UP (ref 34.5–45)
HGB BLD-MCNC: 12.8 G/DL — SIGNIFICANT CHANGE UP (ref 11.5–15.5)
MCHC RBC-ENTMCNC: 30.4 PG — SIGNIFICANT CHANGE UP (ref 27–34)
MCHC RBC-ENTMCNC: 33.1 GM/DL — SIGNIFICANT CHANGE UP (ref 32–36)
MCV RBC AUTO: 91.9 FL — SIGNIFICANT CHANGE UP (ref 80–100)
NRBC # BLD: 0 /100 WBCS — SIGNIFICANT CHANGE UP (ref 0–0)
PLATELET # BLD AUTO: 317 K/UL — SIGNIFICANT CHANGE UP (ref 150–400)
POTASSIUM SERPL-MCNC: 3.7 MMOL/L — SIGNIFICANT CHANGE UP (ref 3.5–5.3)
POTASSIUM SERPL-SCNC: 3.7 MMOL/L — SIGNIFICANT CHANGE UP (ref 3.5–5.3)
RBC # BLD: 4.21 M/UL — SIGNIFICANT CHANGE UP (ref 3.8–5.2)
RBC # FLD: 12.7 % — SIGNIFICANT CHANGE UP (ref 10.3–14.5)
RH IG SCN BLD-IMP: POSITIVE — SIGNIFICANT CHANGE UP
SODIUM SERPL-SCNC: 140 MMOL/L — SIGNIFICANT CHANGE UP (ref 135–145)
WBC # BLD: 5.5 K/UL — SIGNIFICANT CHANGE UP (ref 3.8–10.5)
WBC # FLD AUTO: 5.5 K/UL — SIGNIFICANT CHANGE UP (ref 3.8–10.5)

## 2022-08-22 PROCEDURE — 86850 RBC ANTIBODY SCREEN: CPT

## 2022-08-22 PROCEDURE — 87641 MR-STAPH DNA AMP PROBE: CPT

## 2022-08-22 PROCEDURE — 87640 STAPH A DNA AMP PROBE: CPT

## 2022-08-22 PROCEDURE — 86900 BLOOD TYPING SEROLOGIC ABO: CPT

## 2022-08-22 PROCEDURE — G0463: CPT

## 2022-08-22 PROCEDURE — 86901 BLOOD TYPING SEROLOGIC RH(D): CPT

## 2022-08-22 PROCEDURE — 80048 BASIC METABOLIC PNL TOTAL CA: CPT

## 2022-08-22 PROCEDURE — 85027 COMPLETE CBC AUTOMATED: CPT

## 2022-08-22 RX ORDER — CEFAZOLIN SODIUM 1 G
2000 VIAL (EA) INJECTION ONCE
Refills: 0 | Status: DISCONTINUED | OUTPATIENT
Start: 2022-09-07 | End: 2022-09-10

## 2022-08-22 NOTE — H&P PST ADULT - LAST ECHOCARDIOGRAM
2/2020 - chest pain [place] costochondritis 2/2020 - WNL as per pt. (was Dx with costochondritis)

## 2022-08-22 NOTE — H&P PST ADULT - HISTORY OF PRESENT ILLNESS
37 yo female, reports diplopia after giving birth to her child on 22, later on she noticed decrease hearing on the right, eventually saw a neuro ophathalmologist and MRI revealed a brain mass with brainstem compression, suspicous of a meningioma. Pt. presents to [facility] for scheduled Right Retrosigmoid Craniotomy for Skull Base Meningioma on 22. Pt. had COVID-19 infection on 2022 with symptoms of cough and sore throat. Pt. denies recent fever, chills, chest pain, SOB, changes in bowel/urinary habits.  Discussed this lesion is the cause of her diplopia and hearing loss  On Physcial exam she has no symptoms of brain stem compression  Recommend right craniotomy and surgical resection of this lesion. Reviewed risks and benefits of surgery with patient and spouse in great detail and they wish to proceed.   Refer to ENT Dr. Bowens to formally eval lower cranial nerves and vanessa shoemaker pre op.   22 -  - breast feeding,   [id number] vision day after giving birth, ophthalmologits, neuro ophthalmigist - intermitttent headache, right side hearing diminshed.  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 Pt. presents to [facility] for scheduled Right Retrosigmoid Craniotomy for Skull Base Meningioma on 9/7/22. Pt. had COVID-19 infection on 2/2022 with symptoms of cough and sore throat - recovered at home. Pt. denies recent fever, chills, chest pain, SOB, changes in bowel/urinary habits. Pt. scheduled to see Dr. Bowens pre-op for baseline audiology and cranial nerve function-has appointment on 9/2/22    Pt. is scheduled to have COVID-19 testing on 9/4 at [facility]

## 2022-08-22 NOTE — H&P PST ADULT - PROBLEM SELECTOR PLAN 1
Right Retrosigmoid Craniotomy for Skull ase Meningioma on 9/7/22  Pre-op instructions, including Chlorhexidine soap, provided - all questions answered  Labs done at [facility] swab 9/4 at [facility]

## 2022-08-22 NOTE — H&P PST ADULT - ASSESSMENT
LAI VTE 2.0 SCORE [CLOT updated 2019]    AGE RELATED RISK FACTORS                                                       MOBILITY RELATED FACTORS  [ ] Age 41-60 years                                            (1 Point)                    [ ] Bed rest                                                        (1 Point)  [ ] Age: 61-74 years                                           (2 Points)                  [ ] Plaster cast                                                   (2 Points)  [ ] Age= 75 years                                              (3 Points)                    [ ] Bed bound for more than 72 hours                 (2 Points)    DISEASE RELATED RISK FACTORS                                               GENDER SPECIFIC FACTORS  [ ] Edema in the lower extremities                       (1 Point)              [ ] Pregnancy                                                     (1 Point)  [ ] Varicose veins                                               (1 Point)                     [ ] Post-partum < 6 weeks                                   (1 Point)             [ ] BMI > 25 Kg/m2                                            (1 Point)                     [ ] Hormonal therapy  or oral contraception          (1 Point)                 [ ] Sepsis (in the previous month)                        (1 Point)               [ ] History of pregnancy complications                 (1 point)  [ ] Pneumonia or serious lung disease                                               [ ] Unexplained or recurrent                     (1 Point)           (in the previous month)                               (1 Point)  [ ] Abnormal pulmonary function test                     (1 Point)                 SURGERY RELATED RISK FACTORS  [ ] Acute myocardial infarction                              (1 Point)               [ ]  Section                                             (1 Point)  [ ] Congestive heart failure (in the previous month)  (1 Point)      [ ] Minor surgery                                                  (1 Point)   [ ] Inflammatory bowel disease                             (1 Point)               [ ] Arthroscopic surgery                                        (2 Points)  [ ] Central venous access                                      (2 Points)                [x ] General surgery lasting more than 45 minutes (2 points)  [ ] Malignancy- Present or previous                   (2 Points)                [ ] Elective arthroplasty                                         (5 points)    [ ] Stroke (in the previous month)                          (5 Points)                                                                                                                                                           HEMATOLOGY RELATED FACTORS                                                 TRAUMA RELATED RISK FACTORS  [ ] Prior episodes of VTE                                     (3 Points)                [ ] Fracture of the hip, pelvis, or leg                       (5 Points)  [ ] Positive family history for VTE                         (3 Points)             [ ] Acute spinal cord injury (in the previous month)  (5 Points)  [ ] Prothrombin 28108 A                                     (3 Points)               [ ] Paralysis  (less than 1 month)                             (5 Points)  [ ] Factor V Leiden                                             (3 Points)                  [ ] Multiple Trauma within 1 month                        (5 Points)  [ ] Lupus anticoagulants                                     (3 Points)                                                           [ ] Anticardiolipin antibodies                               (3 Points)                                                       [ ] High homocysteine in the blood                      (3 Points)                                             [ ] Other congenital or acquired thrombophilia      (3 Points)                                                [ ] Heparin induced thrombocytopenia                  (3 Points)                                     Total Score [   2       ]

## 2022-08-22 NOTE — H&P PST ADULT - NSICDXPASTSURGICALHX_GEN_ALL_CORE_FT
PAST SURGICAL HISTORY:  H/O foot surgery      PAST SURGICAL HISTORY:  H/O foot surgery , right 2nd digit    S/P  22

## 2022-08-22 NOTE — H&P PST ADULT - NSICDXPASTMEDICALHX_GEN_ALL_CORE_FT
-Take medications as prescribed. Prilosec should be taken on an empty stomach 30 min prior to your first meal of the day.   -Take Miralax, 1 capful daily, for one week. After that switch to a teaspoon of metamucil daily for maintenance.  -Maintain physical activity  -Eat small meals, increase soluble fiber  -Avoid lying down after meals  -Elevate head of bed  -Avoid caffeine and alcohol  -Keep a follow up visit with your primary care provider.    PAST MEDICAL HISTORY:  History of cerebral meningioma      PAST MEDICAL HISTORY:  Costochondral chest pain 2/2020 - resolved    History of cerebral meningioma

## 2022-08-22 NOTE — H&P PST ADULT - NSALCOHOLUSECOMMENT_GEN_ALL_CORE_FT
I have reviewed and confirmed nurses' notes for patient's medications, allergies, medical history, and surgical history. Denies

## 2022-08-23 LAB
MRSA PCR RESULT.: SIGNIFICANT CHANGE UP
S AUREUS DNA NOSE QL NAA+PROBE: SIGNIFICANT CHANGE UP

## 2022-08-26 PROBLEM — Z86.69 PERSONAL HISTORY OF OTHER DISEASES OF THE NERVOUS SYSTEM AND SENSE ORGANS: Chronic | Status: ACTIVE | Noted: 2022-08-22

## 2022-08-26 PROBLEM — R07.89 OTHER CHEST PAIN: Chronic | Status: ACTIVE | Noted: 2022-08-22

## 2022-09-02 ENCOUNTER — NON-APPOINTMENT (OUTPATIENT)
Age: 37
End: 2022-09-02

## 2022-09-02 ENCOUNTER — APPOINTMENT (OUTPATIENT)
Dept: OTOLARYNGOLOGY | Facility: CLINIC | Age: 37
End: 2022-09-02

## 2022-09-02 VITALS
HEART RATE: 61 BPM | SYSTOLIC BLOOD PRESSURE: 115 MMHG | TEMPERATURE: 97.3 F | HEIGHT: 64 IN | BODY MASS INDEX: 21.85 KG/M2 | DIASTOLIC BLOOD PRESSURE: 79 MMHG | WEIGHT: 128 LBS

## 2022-09-02 PROCEDURE — 31575 DIAGNOSTIC LARYNGOSCOPY: CPT

## 2022-09-02 PROCEDURE — 92567 TYMPANOMETRY: CPT

## 2022-09-02 PROCEDURE — 99213 OFFICE O/P EST LOW 20 MIN: CPT | Mod: 25

## 2022-09-02 PROCEDURE — 92557 COMPREHENSIVE HEARING TEST: CPT

## 2022-09-02 NOTE — PROCEDURE
[de-identified] : Flexible scope #28 used. Passed through nasal passage and nasopharynx/oropharynx/hypopharynx clear. Supraglottis normal. Glottis with fully mobile vocal cords without lesions or masses. Visualized subglottis clear. Postcricoid area without erythema or edema. No pooling of secretions.\par \par

## 2022-09-02 NOTE — PHYSICAL EXAM
[Midline] : trachea located in midline position [Normal] : ocular motility and gaze are normal [de-identified] : mild right 6th weakness

## 2022-09-02 NOTE — ASSESSMENT
[FreeTextEntry1] : Right CPA cistern mass / Meningioma:\par - Lower cranial nerves intact other than Right 6th and 8th\par \par Right SNHL:\par - only mild pure tone drop but very poor discrimination\par - recommend f/u with repeat audio 3 months post-op\par \par \par \par I personally saw and examined Ms. BASIL ACEVEDO in detail this visit today. I personally reviewed the HPI, PMH, FH, SH, ROS and medications/allergies. I have spoken to CHRISTIAN Lindquist regarding the history and have personally determined the assessment and plan of care, and documented this myself. I was present and participated in all key portions of the encounter and all procedures noted above. I have made changes in the body of the note where appropriate.\par \par Attesting Faculty: See Attending Signature Below

## 2022-09-02 NOTE — HISTORY OF PRESENT ILLNESS
[de-identified] : 37 y/o F, referred by Dr. Lau for evaluation of lower cranial nerves and hearing loss.  Pt. with Right CPA cistern mass / Meningioma.  Pos double vision as well as decreased hearing in right ear without tinnitus.  No vertigo. voice is good.  No trouble eating or drinking.

## 2022-09-02 NOTE — CONSULT LETTER
[Dear  ___] : Dear  [unfilled], [Consult Letter:] : I had the pleasure of evaluating your patient, [unfilled]. [Please see my note below.] : Please see my note below. [Consult Closing:] : Thank you very much for allowing me to participate in the care of this patient.  If you have any questions, please do not hesitate to contact me. [Sincerely,] : Sincerely, [FreeTextEntry3] : Margaret Bowens MD\par Otolaryngology and Cranial Base Surgery\par Attending Physician - Department of Otolaryngology and Head & Neck Surgery\par [facility]\par  - [employer] [facility]\par Office: [phone]\par Fax: [phone]\par

## 2022-09-06 ENCOUNTER — TRANSCRIPTION ENCOUNTER (OUTPATIENT)
Age: 37
End: 2022-09-06

## 2022-09-06 ENCOUNTER — OUTPATIENT (OUTPATIENT)
Dept: OUTPATIENT SERVICES | Facility: HOSPITAL | Age: 37
LOS: 1 days | End: 2022-09-06
Payer: COMMERCIAL

## 2022-09-06 ENCOUNTER — APPOINTMENT (OUTPATIENT)
Age: 37
End: 2022-09-06

## 2022-09-06 DIAGNOSIS — D32.0 BENIGN NEOPLASM OF CEREBRAL MENINGES: ICD-10-CM

## 2022-09-06 DIAGNOSIS — Z98.890 OTHER SPECIFIED POSTPROCEDURAL STATES: Chronic | ICD-10-CM

## 2022-09-06 DIAGNOSIS — Z98.891 HISTORY OF UTERINE SCAR FROM PREVIOUS SURGERY: Chronic | ICD-10-CM

## 2022-09-06 LAB — SARS-COV-2 N GENE NPH QL NAA+PROBE: NOT DETECTED

## 2022-09-06 PROCEDURE — A9585: CPT

## 2022-09-06 PROCEDURE — 70553 MRI BRAIN STEM W/O & W/DYE: CPT

## 2022-09-06 PROCEDURE — 70553 MRI BRAIN STEM W/O & W/DYE: CPT | Mod: 26

## 2022-09-07 ENCOUNTER — TRANSCRIPTION ENCOUNTER (OUTPATIENT)
Age: 37
End: 2022-09-07

## 2022-09-07 ENCOUNTER — APPOINTMENT (OUTPATIENT)
Dept: NEUROSURGERY | Facility: HOSPITAL | Age: 37
End: 2022-09-07

## 2022-09-07 ENCOUNTER — RESULT REVIEW (OUTPATIENT)
Age: 37
End: 2022-09-07

## 2022-09-07 ENCOUNTER — INPATIENT (INPATIENT)
Facility: HOSPITAL | Age: 37
LOS: 5 days | Discharge: HOME | DRG: 25 | End: 2022-09-13
Attending: NEUROLOGICAL SURGERY | Admitting: NEUROLOGICAL SURGERY
Payer: COMMERCIAL

## 2022-09-07 VITALS
RESPIRATION RATE: 18 BRPM | TEMPERATURE: 98 F | SYSTOLIC BLOOD PRESSURE: 117 MMHG | HEART RATE: 69 BPM | DIASTOLIC BLOOD PRESSURE: 81 MMHG | HEIGHT: 64 IN | OXYGEN SATURATION: 98 % | WEIGHT: 125.88 LBS

## 2022-09-07 DIAGNOSIS — Z98.890 OTHER SPECIFIED POSTPROCEDURAL STATES: Chronic | ICD-10-CM

## 2022-09-07 DIAGNOSIS — Z98.891 HISTORY OF UTERINE SCAR FROM PREVIOUS SURGERY: Chronic | ICD-10-CM

## 2022-09-07 DIAGNOSIS — D32.0 BENIGN NEOPLASM OF CEREBRAL MENINGES: ICD-10-CM

## 2022-09-07 LAB
GAS PNL BLDA: SIGNIFICANT CHANGE UP
HCG UR QL: NEGATIVE — SIGNIFICANT CHANGE UP
HEPARINASE TEG R TIME: 6.8 MIN — SIGNIFICANT CHANGE UP (ref 4.3–8.3)
RAPIDTEG MAXIMUM AMPLITUDE: 62.5 MM — SIGNIFICANT CHANGE UP (ref 52–70)
RH IG SCN BLD-IMP: POSITIVE — SIGNIFICANT CHANGE UP
TEG FUNCTIONAL FIBRINOGEN: 22.6 MM — SIGNIFICANT CHANGE UP (ref 15–32)
TEG MAXIMUM AMPLITUDE: 60.6 MM — SIGNIFICANT CHANGE UP (ref 52–69)
TEG REACTION TIME: 6.7 MIN — SIGNIFICANT CHANGE UP (ref 4.6–9.1)

## 2022-09-07 PROCEDURE — 88341 IMHCHEM/IMCYTCHM EA ADD ANTB: CPT | Mod: 26,59

## 2022-09-07 PROCEDURE — 88360 TUMOR IMMUNOHISTOCHEM/MANUAL: CPT | Mod: 26

## 2022-09-07 PROCEDURE — 88307 TISSUE EXAM BY PATHOLOGIST: CPT | Mod: 26

## 2022-09-07 PROCEDURE — 70450 CT HEAD/BRAIN W/O DYE: CPT | Mod: 26

## 2022-09-07 PROCEDURE — 88342 IMHCHEM/IMCYTCHM 1ST ANTB: CPT | Mod: 26,59

## 2022-09-07 PROCEDURE — 88334 PATH CONSLTJ SURG CYTO XM EA: CPT | Mod: 26,59

## 2022-09-07 PROCEDURE — 61520 REMOVAL OF BRAIN LESION: CPT | Mod: 22

## 2022-09-07 PROCEDURE — 71045 X-RAY EXAM CHEST 1 VIEW: CPT | Mod: 26

## 2022-09-07 PROCEDURE — 61781 SCAN PROC CRANIAL INTRA: CPT

## 2022-09-07 PROCEDURE — 69990 MICROSURGERY ADD-ON: CPT | Mod: 59

## 2022-09-07 PROCEDURE — 88331 PATH CONSLTJ SURG 1 BLK 1SPC: CPT | Mod: 26

## 2022-09-07 DEVICE — DVC ADHERUS AUTOSPRAY W/ DURAL SEALANT EXT TIP: Type: IMPLANTABLE DEVICE | Site: RIGHT | Status: FUNCTIONAL

## 2022-09-07 DEVICE — SCREW UN3 AXS SELF DRILL 1.5X4MM 5/PK: Type: IMPLANTABLE DEVICE | Site: RIGHT | Status: FUNCTIONAL

## 2022-09-07 DEVICE — SURGICEL 2 X 14": Type: IMPLANTABLE DEVICE | Site: RIGHT | Status: FUNCTIONAL

## 2022-09-07 DEVICE — KIT A-LINE 1LUM 20G X 12CM SAFE KIT: Type: IMPLANTABLE DEVICE | Site: RIGHT | Status: FUNCTIONAL

## 2022-09-07 DEVICE — SURGIFOAM PAD 8CM X 12.5CM X 10MM (100): Type: IMPLANTABLE DEVICE | Site: RIGHT | Status: FUNCTIONAL

## 2022-09-07 DEVICE — TACHOSIL 9.5 X 4.8CM: Type: IMPLANTABLE DEVICE | Site: RIGHT | Status: FUNCTIONAL

## 2022-09-07 DEVICE — IMPLANTABLE DEVICE: Type: IMPLANTABLE DEVICE | Site: RIGHT | Status: FUNCTIONAL

## 2022-09-07 DEVICE — SURGIFLO MATRIX WITH THROMBIN KIT: Type: IMPLANTABLE DEVICE | Site: RIGHT | Status: FUNCTIONAL

## 2022-09-07 DEVICE — GRAFT DURAGEN PLUS 3X3IN: Type: IMPLANTABLE DEVICE | Site: RIGHT | Status: FUNCTIONAL

## 2022-09-07 DEVICE — PLATE UN3 STRAIGHT 16 HOLE: Type: IMPLANTABLE DEVICE | Site: RIGHT | Status: FUNCTIONAL

## 2022-09-07 RX ORDER — ACETAMINOPHEN 500 MG
1000 TABLET ORAL ONCE
Refills: 0 | Status: COMPLETED | OUTPATIENT
Start: 2022-09-07 | End: 2022-09-08

## 2022-09-07 RX ORDER — PANTOPRAZOLE SODIUM 20 MG/1
40 TABLET, DELAYED RELEASE ORAL DAILY
Refills: 0 | Status: DISCONTINUED | OUTPATIENT
Start: 2022-09-07 | End: 2022-09-08

## 2022-09-07 RX ORDER — CHLORHEXIDINE GLUCONATE 213 G/1000ML
15 SOLUTION TOPICAL EVERY 12 HOURS
Refills: 0 | Status: DISCONTINUED | OUTPATIENT
Start: 2022-09-07 | End: 2022-09-08

## 2022-09-07 RX ORDER — LIDOCAINE HCL 20 MG/ML
0.2 VIAL (ML) INJECTION ONCE
Refills: 0 | Status: DISCONTINUED | OUTPATIENT
Start: 2022-09-07 | End: 2022-09-07

## 2022-09-07 RX ORDER — SENNA PLUS 8.6 MG/1
2 TABLET ORAL AT BEDTIME
Refills: 0 | Status: DISCONTINUED | OUTPATIENT
Start: 2022-09-07 | End: 2022-09-13

## 2022-09-07 RX ORDER — CHLORHEXIDINE GLUCONATE 213 G/1000ML
1 SOLUTION TOPICAL ONCE
Refills: 0 | Status: DISCONTINUED | OUTPATIENT
Start: 2022-09-07 | End: 2022-09-07

## 2022-09-07 RX ORDER — SODIUM CHLORIDE 9 MG/ML
3 INJECTION INTRAMUSCULAR; INTRAVENOUS; SUBCUTANEOUS EVERY 8 HOURS
Refills: 0 | Status: DISCONTINUED | OUTPATIENT
Start: 2022-09-07 | End: 2022-09-07

## 2022-09-07 RX ORDER — INFLUENZA VIRUS VACCINE 15; 15; 15; 15 UG/.5ML; UG/.5ML; UG/.5ML; UG/.5ML
0.5 SUSPENSION INTRAMUSCULAR ONCE
Refills: 0 | Status: DISCONTINUED | OUTPATIENT
Start: 2022-09-07 | End: 2022-09-08

## 2022-09-07 RX ORDER — POLYETHYLENE GLYCOL 3350 17 G/17G
17 POWDER, FOR SOLUTION ORAL EVERY 12 HOURS
Refills: 0 | Status: DISCONTINUED | OUTPATIENT
Start: 2022-09-07 | End: 2022-09-13

## 2022-09-07 RX ORDER — HYDROMORPHONE HYDROCHLORIDE 2 MG/ML
0.25 INJECTION INTRAMUSCULAR; INTRAVENOUS; SUBCUTANEOUS EVERY 4 HOURS
Refills: 0 | Status: DISCONTINUED | OUTPATIENT
Start: 2022-09-07 | End: 2022-09-08

## 2022-09-07 RX ORDER — DEXAMETHASONE 0.5 MG/5ML
6 ELIXIR ORAL EVERY 6 HOURS
Refills: 0 | Status: DISCONTINUED | OUTPATIENT
Start: 2022-09-07 | End: 2022-09-10

## 2022-09-07 RX ORDER — CEFAZOLIN SODIUM 1 G
2000 VIAL (EA) INJECTION EVERY 8 HOURS
Refills: 0 | Status: COMPLETED | OUTPATIENT
Start: 2022-09-07 | End: 2022-09-08

## 2022-09-07 RX ORDER — DESMOPRESSIN ACETATE 0.1 MG/1
17 TABLET ORAL ONCE
Refills: 0 | Status: COMPLETED | OUTPATIENT
Start: 2022-09-07 | End: 2022-09-07

## 2022-09-07 RX ORDER — SODIUM CHLORIDE 9 MG/ML
1000 INJECTION INTRAMUSCULAR; INTRAVENOUS; SUBCUTANEOUS
Refills: 0 | Status: DISCONTINUED | OUTPATIENT
Start: 2022-09-07 | End: 2022-09-08

## 2022-09-07 RX ORDER — FENTANYL CITRATE 50 UG/ML
100 INJECTION INTRAVENOUS ONCE
Refills: 0 | Status: DISCONTINUED | OUTPATIENT
Start: 2022-09-07 | End: 2022-09-08

## 2022-09-07 RX ORDER — ACETAMINOPHEN 500 MG
650 TABLET ORAL EVERY 6 HOURS
Refills: 0 | Status: DISCONTINUED | OUTPATIENT
Start: 2022-09-07 | End: 2022-09-13

## 2022-09-07 NOTE — PROGRESS NOTE ADULT - ASSESSMENT
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 Pt. 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 Pt. had COVID-19 infection on 2/2022 with symptoms of cough and sore throat - recovered at home. Pt. denies recent fever, chills, chest pain, SOB, changes in bowel/urinary habits. Pt. scheduled to see Dr. Bowens pre-op for baseline audiology and cranial nerve function-has appointment on 9/2/22          NEURO:  [id number]  CTH postop looks stable  CT Head in AM, MRI post-op, no drains per NSGY, Pain control fent pushes prn, gtt if no improvement ( shivering, tachycardic, triggering vent w peak press)  sedation w precedex prn, wean propofol as tolerated  Activity: [] OOB as tolerated [x] Bedrest [] PT [] OT [] PMNR    PULM: cont vent settings prvc 12/450/5/40%  cxr w ett above clavicles, after 2 adjustments at level of clavicles, w normal peak press and sat 100%, monitor      CV:  Keep -150mmHg, L  radial a-line   cardene PRN  2 18 gauge IVs    RENAL: monitor UOP, per anesthesia put out >8L, received DDAVP  IVF until good PO intake, d/c harris in AM    GI: keep npo  Diet: when able, Dysphagia screen and then advance diet as tolerated  GI prophylaxis [] not indicated [x] PPI while intubated  Bowel regimen [] colace [] senna [] other:    ENDO: monitor glucose  Goal euglycemia (-180)    HEME/ONC:  VTE prophylaxis: [] SCDs [] chemoprophylaxis [] hold chemoprophylaxis due to: [] high risk of DVT/PE on admission due to:    ID:  Minnie-op antibiotics    MISC:    SOCIAL/FAMILY:  [] awaiting [] updated at bedside [] family meeting    CODE STATUS:  [] Full Code [] DNR [] DNI [] Palliative/Comfort Care    DISPOSITION:  [] ICU [] Stroke Unit [] Floor [] EMU [] RCU [] PCU    [] Patient is at high risk of neurologic deterioration/death due to:     Time seen:  Time spent: ___ [] critical care minutes    Contact: [phone] ASSESSMENT:   35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 Pt. 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 Pt. had COVID-19 infection on 2/2022 with symptoms of cough and sore throat - recovered at home. Pt. denies recent fever, chills, chest pain, SOB, changes in bowel/urinary habits. Pt. scheduled to see Dr. Bowens pre-op for baseline audiology and cranial nerve function-has appointment on 9/2/22          NEURO:  [id number]  CTH postop looks stable  CT Head in AM, MRI post-op, no drains per NSGY, Pain control fent pushes prn, gtt if no improvement ( shivering, tachycardic, triggering vent w peak press)  sedation w precedex prn, wean propofol as tolerated  Activity: [] OOB as tolerated [x] Bedrest [] PT [] OT [] PMNR    PULM: cont vent settings prvc 12/450/5/40%  cxr w ett above clavicles, after 2 adjustments at level of clavicles, w normal peak press and sat 100%, monitor      CV:  Keep -150mmHg, L  radial a-line   cardene PRN  2 18 gauge IVs    RENAL: monitor UOP, per anesthesia put out >8L, received DDAVP  IVF until good PO intake, d/c harris in AM    GI: keep npo  Diet: when able, Dysphagia screen and then advance diet as tolerated  GI prophylaxis [] not indicated [x] PPI while intubated  Bowel regimen [] colace [] senna [] other:    ENDO: monitor glucose  Goal euglycemia (-180)    HEME/ONC: check LED  VTE prophylaxis: [x] SCDs [] chemoprophylaxis [] hold chemoprophylaxis due to: POD0    ID: afebrile  periop ancef      MISC:    SOCIAL/FAMILY:  [] awaiting [x] updated father at bedside [] family meeting    CODE STATUS:  [x] Full Code [] DNR [] DNI [] Palliative/Comfort Care    DISPOSITION:  [x] ICU [] Stroke Unit [] Floor [] EMU [] RCU [] PCU          Contact: [phone] ASSESSMENT:   35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 Pt. 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 Pt. had COVID-19 infection on 2/2022 with symptoms of cough and sore throat - recovered at home. Pt. denies recent fever, chills, chest pain, SOB, changes in bowel/urinary habits. Pt. scheduled to see Dr. Bowens pre-op for baseline audiology and cranial nerve function-has appointment on 9/2/22          NEURO:  [id number]  CTH postop looks stable  CT Head in AM, MRI post-op, no drains per NSGY, Pain control fent pushes prn, gtt if no improvement ( shivering, tachycardic, triggering vent w peak press)  sedation w precedex prn, wean propofol as tolerated  Activity: [] OOB as tolerated [x] Bedrest [] PT [] OT [] PMNR    PULM: cont vent settings prvc 12/450/5/40%  cxr w ett above clavicles, after 2 adjustments at level of clavicles, w normal peak press and sat 100%, monitor  wean to ext as tolerated, breathing trials      CV:  Keep -150mmHg, L  radial a-line   cardene PRN  2 18 gauge IVs    RENAL: monitor UOP, per anesthesia put out >8L, received DDAVP  IVF until good PO intake, d/c harris in AM    GI: keep npo anticipating extubation in am  Diet: when able, Dysphagia screen and then advance diet as tolerated  GI prophylaxis [] not indicated [x] PPI while intubated  Bowel regimen [] colace [] senna [] other:    ENDO: monitor glucose  Goal euglycemia (-180)    HEME/ONC: check LED  VTE prophylaxis: [x] SCDs [] chemoprophylaxis [] hold chemoprophylaxis due to: POD0    ID: afebrile  periop ancef      MISC:    SOCIAL/FAMILY:  [] awaiting [x] updated father at bedside [] family meeting    CODE STATUS:  [x] Full Code [] DNR [] DNI [] Palliative/Comfort Care    DISPOSITION:  [x] ICU [] Stroke Unit [] Floor [] EMU [] RCU [] PCU          Contact: [phone]

## 2022-09-07 NOTE — PRE-ANESTHESIA EVALUATION ADULT - NSANTHPMHFT_GEN_ALL_CORE
36F with no PMH here with diplopia of both eyes and decreased right hearing who saw neuroophthalmologist who had a MRI which showed brain mass and compression, which is meningioma. Pt only takes prenatal vitamins. PSH include foot surgery 20 years ago and c/s in June with epidural, otherwise no exposure to general anesthesia. Currently denies CP, SOB, GERD like symptoms. Pt had a stress and echo in 2020 which were both normal, and she obtained those tests 2/2 to chest pain after working out- both were normal and etiology most likely costochondritis.

## 2022-09-07 NOTE — BRIEF OPERATIVE NOTE - OPERATION/FINDINGS
RIGHT retrosig crani in lateral position for resection of meningioma with intraop monitoring, synaptive nagivation.

## 2022-09-07 NOTE — PROGRESS NOTE ADULT - SUBJECTIVE AND OBJECTIVE BOX
SUMMARY:    HOSPITAL COURSE:    24 HOUR EVENTS:     ADMISSION SCORES:   GCS: HH: MF: NIHSS: ICH Score:    SEDATION SCORES:  RASS: CAM-ICU:     REVIEW OF SYSTEMS:    ALLERGIES: Allergies    No Known Allergies    Intolerances        VITALS/DATA/ORDERS: [x] Reviewed    DEVICES:   [] Restraints [x] PIVs [] ET tube [] central line [] PICC [x] arterial line [x] harris [] NGT/OGT [] EVD [] LD [] SAMMY/HMV [] LiCOX [] ICP monitor [] Trach [] PEG [] Chest Tube [] other:    EXAMINATION:  General: mild acute distress intubated  HEENT: Anicteric sclerae  Cardiac: [id number] tachycardic  Lungs: Clear, no wheezing  Abdomen: Soft, non-tender, +BS  Extremities: No c/c/e  Skin/Incision Site: Clean, dry and intact  Neurologic: Awake, alert, w horizontal nystagmus, bilat 4th, 6th and 3rd palsies, PERRL 3 mm bilat, follows commands in all 4 ext, face symmetric, tongue midline, no drift, fully AG x4 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 Pt. 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 Pt. had COVID-19 infection on 2/2022 with symptoms of cough and sore throat - recovered at home. Pt. denies recent fever, chills, chest pain, SOB, changes in bowel/urinary habits. Pt. scheduled to see Dr. Bowens pre-op for baseline audiology and cranial nerve function-has appointment on 9/2/22    HOSPITAL COURSE: seen in NSCU, intubated. Reportedly high UOP, got DDAVP, also got extensive manipulation of cranial nerves intraop    24 HOUR EVENTS: s/p RIGHT retrosig crani in lateral position for resection of meningioma with intraop monitoring, synaptive nagivation.      SEDATION SCORES:  RASS: -3 CAM-ICU:     REVIEW OF SYSTEMS: unobtainable, pt intubated    ALLERGIES: Allergies    No Known Allergies    Intolerances        VITALS/DATA/ORDERS: [x] Reviewed    DEVICES:   [] Restraints [x] PIVs [] ET tube [] central line [] PICC [x] arterial line [x] harris [] NGT/OGT [] EVD [] LD [] SAMMY/HMV [] LiCOX [] ICP monitor [] Trach [] PEG [] Chest Tube [] other:    EXAMINATION:  General: mild acute distress intubated  HEENT: Anicteric sclerae  Cardiac: [id number] tachycardic  Lungs: Clear, no wheezing  Abdomen: Soft, non-tender, +BS  Extremities: No c/c/e  Skin/Incision Site: Clean, dry and intact  Neurologic: Awake, alert, w horizontal nystagmus, bilat 4th, 6th and 3rd palsies, PERRL 3 mm bilat, follows commands in all 4 ext (thumbs up, squeezes hands, rises legs, wiggles toes), face symmetric, tongue midline, no drift, fully AG x4

## 2022-09-07 NOTE — PATIENT PROFILE ADULT - FALL HARM RISK - UNIVERSAL INTERVENTIONS
Bed in lowest position, wheels locked, appropriate side rails in place/Call bell, personal items and telephone in reach/Instruct patient to call for assistance before getting out of bed or chair/Non-slip footwear when patient is out of bed/[place] to call system/Physically safe environment - no spills, clutter or unnecessary equipment/Purposeful Proactive Rounding/Room/bathroom lighting operational, light cord in reach

## 2022-09-07 NOTE — PATIENT PROFILE ADULT - STATED REASON FOR ADMISSION
Problem: Goal Outcome Summary  Goal: Goal Outcome Summary  Outcome: Therapy, progress toward functional goals as expected  OT 4A: Supine > sit with min A with HOB elevated to 45 degrees and using bed rail. Tactile cues provided to incorporate LUE into task with pt able to achieve 20 flexion at the shoulder during transfer. Sit <> stand with SBA, transfer to [facility] with SBA, pt very pleased with increased performance and smoothness of transfer this session. Pt engaged in passive to active assist ROM at L shoulder to progress within pain free range. Pt able to recall scapular exercise program from previous session and is making slow steady gains in tolerating passive and active ROM.  Currently recommend TCU however pending progress and LOS pt may progress to d/c home with family assist and home OT.        craniotomy for meningioma

## 2022-09-08 LAB
ANION GAP SERPL CALC-SCNC: 13 MMOL/L — SIGNIFICANT CHANGE UP (ref 5–17)
ANION GAP SERPL CALC-SCNC: 14 MMOL/L — SIGNIFICANT CHANGE UP (ref 5–17)
ANION GAP SERPL CALC-SCNC: 14 MMOL/L — SIGNIFICANT CHANGE UP (ref 5–17)
BUN SERPL-MCNC: 13 MG/DL — SIGNIFICANT CHANGE UP (ref 7–23)
BUN SERPL-MCNC: 6 MG/DL — LOW (ref 7–23)
BUN SERPL-MCNC: 7 MG/DL — SIGNIFICANT CHANGE UP (ref 7–23)
CALCIUM SERPL-MCNC: 9 MG/DL — SIGNIFICANT CHANGE UP (ref 8.4–10.5)
CALCIUM SERPL-MCNC: 9.2 MG/DL — SIGNIFICANT CHANGE UP (ref 8.4–10.5)
CALCIUM SERPL-MCNC: 9.6 MG/DL — SIGNIFICANT CHANGE UP (ref 8.4–10.5)
CHLORIDE SERPL-SCNC: 105 MMOL/L — SIGNIFICANT CHANGE UP (ref 96–108)
CHLORIDE SERPL-SCNC: 98 MMOL/L — SIGNIFICANT CHANGE UP (ref 96–108)
CHLORIDE SERPL-SCNC: 98 MMOL/L — SIGNIFICANT CHANGE UP (ref 96–108)
CO2 SERPL-SCNC: 20 MMOL/L — LOW (ref 22–31)
CO2 SERPL-SCNC: 21 MMOL/L — LOW (ref 22–31)
CO2 SERPL-SCNC: 23 MMOL/L — SIGNIFICANT CHANGE UP (ref 22–31)
CREAT SERPL-MCNC: 0.37 MG/DL — LOW (ref 0.5–1.3)
CREAT SERPL-MCNC: 0.38 MG/DL — LOW (ref 0.5–1.3)
CREAT SERPL-MCNC: 0.42 MG/DL — LOW (ref 0.5–1.3)
EGFR: 129 ML/MIN/1.73M2 — SIGNIFICANT CHANGE UP
EGFR: 132 ML/MIN/1.73M2 — SIGNIFICANT CHANGE UP
EGFR: 133 ML/MIN/1.73M2 — SIGNIFICANT CHANGE UP
GAS PNL BLDA: SIGNIFICANT CHANGE UP
GAS PNL BLDA: SIGNIFICANT CHANGE UP
GLUCOSE SERPL-MCNC: 127 MG/DL — HIGH (ref 70–99)
GLUCOSE SERPL-MCNC: 138 MG/DL — HIGH (ref 70–99)
GLUCOSE SERPL-MCNC: 140 MG/DL — HIGH (ref 70–99)
HCT VFR BLD CALC: 34.5 % — SIGNIFICANT CHANGE UP (ref 34.5–45)
HCT VFR BLD CALC: 36.1 % — SIGNIFICANT CHANGE UP (ref 34.5–45)
HGB BLD-MCNC: 11.6 G/DL — SIGNIFICANT CHANGE UP (ref 11.5–15.5)
HGB BLD-MCNC: 12.3 G/DL — SIGNIFICANT CHANGE UP (ref 11.5–15.5)
MAGNESIUM SERPL-MCNC: 1.9 MG/DL — SIGNIFICANT CHANGE UP (ref 1.6–2.6)
MAGNESIUM SERPL-MCNC: 2.2 MG/DL — SIGNIFICANT CHANGE UP (ref 1.6–2.6)
MCHC RBC-ENTMCNC: 30.1 PG — SIGNIFICANT CHANGE UP (ref 27–34)
MCHC RBC-ENTMCNC: 30.4 PG — SIGNIFICANT CHANGE UP (ref 27–34)
MCHC RBC-ENTMCNC: 33.6 GM/DL — SIGNIFICANT CHANGE UP (ref 32–36)
MCHC RBC-ENTMCNC: 34.1 GM/DL — SIGNIFICANT CHANGE UP (ref 32–36)
MCV RBC AUTO: 88.3 FL — SIGNIFICANT CHANGE UP (ref 80–100)
MCV RBC AUTO: 90.3 FL — SIGNIFICANT CHANGE UP (ref 80–100)
NRBC # BLD: 0 /100 WBCS — SIGNIFICANT CHANGE UP (ref 0–0)
NRBC # BLD: 0 /100 WBCS — SIGNIFICANT CHANGE UP (ref 0–0)
PHOSPHATE SERPL-MCNC: 3.3 MG/DL — SIGNIFICANT CHANGE UP (ref 2.5–4.5)
PHOSPHATE SERPL-MCNC: 4.4 MG/DL — SIGNIFICANT CHANGE UP (ref 2.5–4.5)
PLATELET # BLD AUTO: 219 K/UL — SIGNIFICANT CHANGE UP (ref 150–400)
PLATELET # BLD AUTO: 262 K/UL — SIGNIFICANT CHANGE UP (ref 150–400)
POTASSIUM SERPL-MCNC: 3.6 MMOL/L — SIGNIFICANT CHANGE UP (ref 3.5–5.3)
POTASSIUM SERPL-MCNC: 4.1 MMOL/L — SIGNIFICANT CHANGE UP (ref 3.5–5.3)
POTASSIUM SERPL-MCNC: 4.7 MMOL/L — SIGNIFICANT CHANGE UP (ref 3.5–5.3)
POTASSIUM SERPL-SCNC: 3.6 MMOL/L — SIGNIFICANT CHANGE UP (ref 3.5–5.3)
POTASSIUM SERPL-SCNC: 4.1 MMOL/L — SIGNIFICANT CHANGE UP (ref 3.5–5.3)
POTASSIUM SERPL-SCNC: 4.7 MMOL/L — SIGNIFICANT CHANGE UP (ref 3.5–5.3)
RBC # BLD: 3.82 M/UL — SIGNIFICANT CHANGE UP (ref 3.8–5.2)
RBC # BLD: 4.09 M/UL — SIGNIFICANT CHANGE UP (ref 3.8–5.2)
RBC # FLD: 12.4 % — SIGNIFICANT CHANGE UP (ref 10.3–14.5)
RBC # FLD: 12.5 % — SIGNIFICANT CHANGE UP (ref 10.3–14.5)
SODIUM SERPL-SCNC: 132 MMOL/L — LOW (ref 135–145)
SODIUM SERPL-SCNC: 132 MMOL/L — LOW (ref 135–145)
SODIUM SERPL-SCNC: 142 MMOL/L — SIGNIFICANT CHANGE UP (ref 135–145)
WBC # BLD: 16 K/UL — HIGH (ref 3.8–10.5)
WBC # BLD: 8.89 K/UL — SIGNIFICANT CHANGE UP (ref 3.8–10.5)
WBC # FLD AUTO: 16 K/UL — HIGH (ref 3.8–10.5)
WBC # FLD AUTO: 8.89 K/UL — SIGNIFICANT CHANGE UP (ref 3.8–10.5)

## 2022-09-08 PROCEDURE — 99233 SBSQ HOSP IP/OBS HIGH 50: CPT

## 2022-09-08 PROCEDURE — 71045 X-RAY EXAM CHEST 1 VIEW: CPT | Mod: 26

## 2022-09-08 PROCEDURE — 70450 CT HEAD/BRAIN W/O DYE: CPT | Mod: 26

## 2022-09-08 RX ORDER — POTASSIUM CHLORIDE 20 MEQ
10 PACKET (EA) ORAL
Refills: 0 | Status: COMPLETED | OUTPATIENT
Start: 2022-09-08 | End: 2022-09-08

## 2022-09-08 RX ORDER — CHLORHEXIDINE GLUCONATE 213 G/1000ML
1 SOLUTION TOPICAL
Refills: 0 | Status: DISCONTINUED | OUTPATIENT
Start: 2022-09-08 | End: 2022-09-10

## 2022-09-08 RX ORDER — DEXMEDETOMIDINE HYDROCHLORIDE IN 0.9% SODIUM CHLORIDE 4 UG/ML
0.2 INJECTION INTRAVENOUS
Qty: 200 | Refills: 0 | Status: DISCONTINUED | OUTPATIENT
Start: 2022-09-08 | End: 2022-09-08

## 2022-09-08 RX ORDER — SODIUM CHLORIDE 9 MG/ML
1000 INJECTION INTRAMUSCULAR; INTRAVENOUS; SUBCUTANEOUS
Refills: 0 | Status: DISCONTINUED | OUTPATIENT
Start: 2022-09-08 | End: 2022-09-09

## 2022-09-08 RX ORDER — PROPOFOL 10 MG/ML
75 INJECTION, EMULSION INTRAVENOUS
Qty: 1000 | Refills: 0 | Status: DISCONTINUED | OUTPATIENT
Start: 2022-09-08 | End: 2022-09-08

## 2022-09-08 RX ORDER — FENTANYL CITRATE 50 UG/ML
50 INJECTION INTRAVENOUS
Refills: 0 | Status: DISCONTINUED | OUTPATIENT
Start: 2022-09-08 | End: 2022-09-08

## 2022-09-08 RX ORDER — ACETAMINOPHEN 500 MG
1000 TABLET ORAL ONCE
Refills: 0 | Status: COMPLETED | OUTPATIENT
Start: 2022-09-08 | End: 2022-09-08

## 2022-09-08 RX ORDER — NICARDIPINE HYDROCHLORIDE 30 MG/1
5 CAPSULE, EXTENDED RELEASE ORAL
Qty: 40 | Refills: 0 | Status: DISCONTINUED | OUTPATIENT
Start: 2022-09-08 | End: 2022-09-08

## 2022-09-08 RX ORDER — MAGNESIUM SULFATE 500 MG/ML
1 VIAL (ML) INJECTION ONCE
Refills: 0 | Status: COMPLETED | OUTPATIENT
Start: 2022-09-08 | End: 2022-09-08

## 2022-09-08 RX ORDER — ONDANSETRON 8 MG/1
4 TABLET, FILM COATED ORAL EVERY 6 HOURS
Refills: 0 | Status: DISCONTINUED | OUTPATIENT
Start: 2022-09-08 | End: 2022-09-08

## 2022-09-08 RX ADMIN — SODIUM CHLORIDE 60 MILLILITER(S): 9 INJECTION INTRAMUSCULAR; INTRAVENOUS; SUBCUTANEOUS at 21:18

## 2022-09-08 RX ADMIN — DEXMEDETOMIDINE HYDROCHLORIDE IN 0.9% SODIUM CHLORIDE 2.86 MICROGRAM(S)/KG/HR: 4 INJECTION INTRAVENOUS at 02:24

## 2022-09-08 RX ADMIN — Medication 1000 MILLIGRAM(S): at 00:34

## 2022-09-08 RX ADMIN — NICARDIPINE HYDROCHLORIDE 25 MG/HR: 30 CAPSULE, EXTENDED RELEASE ORAL at 07:16

## 2022-09-08 RX ADMIN — Medication 6 MILLIGRAM(S): at 00:10

## 2022-09-08 RX ADMIN — ONDANSETRON 4 MILLIGRAM(S): 8 TABLET, FILM COATED ORAL at 09:15

## 2022-09-08 RX ADMIN — Medication 6 MILLIGRAM(S): at 17:41

## 2022-09-08 RX ADMIN — SODIUM CHLORIDE 100 MILLILITER(S): 9 INJECTION INTRAMUSCULAR; INTRAVENOUS; SUBCUTANEOUS at 00:05

## 2022-09-08 RX ADMIN — Medication 6 MILLIGRAM(S): at 23:30

## 2022-09-08 RX ADMIN — Medication 100 MILLIGRAM(S): at 21:18

## 2022-09-08 RX ADMIN — Medication 100 MILLIGRAM(S): at 05:09

## 2022-09-08 RX ADMIN — Medication 100 MILLIEQUIVALENT(S): at 05:08

## 2022-09-08 RX ADMIN — NICARDIPINE HYDROCHLORIDE 25 MG/HR: 30 CAPSULE, EXTENDED RELEASE ORAL at 02:24

## 2022-09-08 RX ADMIN — FENTANYL CITRATE 100 MICROGRAM(S): 50 INJECTION INTRAVENOUS at 00:04

## 2022-09-08 RX ADMIN — CHLORHEXIDINE GLUCONATE 15 MILLILITER(S): 213 SOLUTION TOPICAL at 05:09

## 2022-09-08 RX ADMIN — Medication 6 MILLIGRAM(S): at 12:27

## 2022-09-08 RX ADMIN — Medication 100 MILLIGRAM(S): at 15:00

## 2022-09-08 RX ADMIN — Medication 400 MILLIGRAM(S): at 00:04

## 2022-09-08 RX ADMIN — CHLORHEXIDINE GLUCONATE 1 APPLICATION(S): 213 SOLUTION TOPICAL at 21:33

## 2022-09-08 RX ADMIN — Medication 1000 MILLIGRAM(S): at 12:40

## 2022-09-08 RX ADMIN — Medication 400 MILLIGRAM(S): at 12:10

## 2022-09-08 RX ADMIN — Medication 100 GRAM(S): at 05:09

## 2022-09-08 RX ADMIN — Medication 100 MILLIEQUIVALENT(S): at 03:35

## 2022-09-08 RX ADMIN — Medication 100 MILLIEQUIVALENT(S): at 02:20

## 2022-09-08 RX ADMIN — SODIUM CHLORIDE 60 MILLILITER(S): 9 INJECTION INTRAMUSCULAR; INTRAVENOUS; SUBCUTANEOUS at 07:16

## 2022-09-08 RX ADMIN — PROPOFOL 25.7 MICROGRAM(S)/KG/MIN: 10 INJECTION, EMULSION INTRAVENOUS at 02:24

## 2022-09-08 RX ADMIN — PROPOFOL 25.7 MICROGRAM(S)/KG/MIN: 10 INJECTION, EMULSION INTRAVENOUS at 07:17

## 2022-09-08 RX ADMIN — DEXMEDETOMIDINE HYDROCHLORIDE IN 0.9% SODIUM CHLORIDE 2.86 MICROGRAM(S)/KG/HR: 4 INJECTION INTRAVENOUS at 07:16

## 2022-09-08 RX ADMIN — FENTANYL CITRATE 50 MICROGRAM(S): 50 INJECTION INTRAVENOUS at 03:14

## 2022-09-08 RX ADMIN — Medication 6 MILLIGRAM(S): at 05:09

## 2022-09-08 NOTE — OCCUPATIONAL THERAPY INITIAL EVALUATION ADULT - PERTINENT HX OF CURRENT PROBLEM, REHAB EVAL
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 Pt. 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 Pt. had COVID-19 infection on 2/2022 with symptoms of cough and sore throat - recovered at home. Pt. denies recent fever, chills, chest pain, SOB, changes in bowel/urinary habits. Pt. scheduled to see Dr. Bowens pre-op for baseline audiology and cranial nerve function-has appointment on 9/2/22. 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 Pt. 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 Pt. had COVID-19 infection on 2/2022 with symptoms of cough and sore throat - recovered at home. Pt. denies recent fever, chills, chest pain, SOB, changes in bowel/urinary habits. Pt. scheduled to see Dr. Bowens pre-op for baseline audiology and cranial nerve function-has appointment on 9/2/22. Pt s/p R Craniotomy for meningioma 9/7    MRI Head: Right CP angle lesion is again seen as described above. 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

## 2022-09-08 NOTE — PROGRESS NOTE ADULT - ASSESSMENT
Keep -150  Pseudomeningocele watch  Left side motors dec. intra-op but returned  Given ddavp 0.3/kg intra-op  CTH AM  MRI 48hrs  Keep Intubated  Dex 6q6  CN 5-12, 9 cut on right side

## 2022-09-08 NOTE — CHART NOTE - NSCHARTNOTEFT_GEN_A_CORE
CAPRINI SCORE [CLOT] Score on Admission for     AGE RELATED RISK FACTORS                                                       MOBILITY RELATED FACTORS  [ ] Age 41-60 years                                            (1 Point)                  [ ] Bed rest                                                        (1 Point)  [ ] Age: 61-74 years                                           (2 Points)                 [ ] Plaster cast                                                   (2 Points)  [ ] Age= 75 years                                              (3 Points)                 [ ] Bed bound for more than 72 hours                 (2 Points)    DISEASE RELATED RISK FACTORS                                               GENDER SPECIFIC FACTORS  [ ] Edema in the lower extremities                       (1 Point)                  [ ] Pregnancy                                                     (1 Point)  [ ] Varicose veins                                               (1 Point)                  [ ] Post-partum < 6 weeks                                   (1 Point)             [ ] BMI > 25 Kg/m2                                            (1 Point)                  [ ] Hormonal therapy  or oral contraception          (1 Point)                 [ ] Sepsis (in the previous month)                        (1 Point)                  [ ] History of pregnancy complications                 (1 point)  [ ] Pneumonia or serious lung disease                                               [ ] Unexplained or recurrent                     (1 Point)           (in the previous month)                               (1 Point)  [ ] Abnormal pulmonary function test                     (1 Point)                 SURGERY RELATED RISK FACTORS (include planned surgeries)  [ ] Acute myocardial infarction                              (1 Point)                 [ ]  Section                                             (1 Point)  [ ] Congestive heart failure (in the previous month)  (1 Point)         [ ] Minor surgery                                                  (1 Point)   [ ] Inflammatory bowel disease                             (1 Point)                 [ ] Arthroscopic surgery                                        (2 Points)  [ ] Central venous access                                      (2 Points)                [ x ] General surgery lasting more than 45 minutes   (2 Points)       [ ] Stroke (in the previous month)                          (5 Points)               [ ] Elective arthroplasty                                         (5 Points)            [ ] current or past malignancy                              (2 Points)                                                                                                       HEMATOLOGY RELATED FACTORS                                                 TRAUMA RELATED RISK FACTORS  [ ] Prior episodes of VTE                                     (3 Points)                [ ] Fracture of the hip, pelvis, or leg                       (5 Points)  [ ] Positive family history for VTE                         (3 Points)                 [ ] Acute spinal cord injury (in the previous month)  (5 Points)  [ ] Prothrombin 82799 A                                     (3 Points)                 [ ] Paralysis  (less than 1 month)                             (5 Points)  [ ] Factor V Leiden                                             (3 Points)                  [ ] Multiple Trauma within 1 month                        (5 Points)  [ ] Lupus anticoagulants                                     (3 Points)                                                           [ ] Anticardiolipin antibodies                               (3 Points)                                                       [ ] High homocysteine in the blood                      (3 Points)                                             [ ] Other congenital or acquired thrombophilia      (3 Points)                                                [ ] Heparin induced thrombocytopenia                  (3 Points)   [ ] Previous Covid-19 confirmed infection             ( 3 Points)                                        Total Score [      2      ]    Risk:  Very low 0   Low 1 to 2   Moderate 3 to 4   High =5       VTE Prophylasix Recommednations:  [ x ] mechanical pneumatic compression devices                                      [ ] contraindicated due to: _____________________  [ ] chemo prophylasix                                                                                   [ x ] contraindicated due to: post op    **** MODERATE/HIGH LIKELIHOOD DVT PRESENT ON ADMISSION  [ x ] (please order LE dopplers within 24 hours of admission)

## 2022-09-08 NOTE — PROGRESS NOTE ADULT - SUBJECTIVE AND OBJECTIVE BOX
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 Pt. 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 Pt. had COVID-19 infection on 2/2022 with symptoms of cough and sore throat - recovered at home. Pt. denies recent fever, chills, chest pain, SOB, changes in bowel/urinary habits. Pt. scheduled to see Dr. Bowens pre-op for baseline audiology and cranial nerve function-has appointment on 9/2/22    HOSPITAL COURSE: seen in NSCU, intubated. Reportedly high UOP, got DDAVP, also got extensive manipulation of cranial nerves intraop    24 HOUR EVENTS: s/p RIGHT retrosig crani in lateral position for resection of meningioma with intraop monitoring, synaptive nagivation.      SEDATION SCORES:  RASS: -3 CAM-ICU:     REVIEW OF SYSTEMS: unobtainable, pt intubated    ALLERGIES: Allergies    No Known Allergies    Intolerances        VITALS/DATA/ORDERS: [x] Reviewed    DEVICES:   [] Restraints [x] PIVs [] ET tube [] central line [] PICC [x] arterial line [x] harris [] NGT/OGT [] EVD [] LD [] SAMMY/HMV [] LiCOX [] ICP monitor [] Trach [] PEG [] Chest Tube [] other:    EXAMINATION:  General: mild acute distress intubated  HEENT: Anicteric sclerae  Cardiac: [id number] tachycardic  Lungs: Clear, no wheezing  Abdomen: Soft, non-tender, +BS  Extremities: No c/c/e  Skin/Incision Site: Clean, dry and intact  Neurologic: Awake, alert, w horizontal nystagmus, bilat 4th, 6th and 3rd palsies, PERRL 3 mm bilat, follows commands in all 4 ext (thumbs up, squeezes hands, rises legs, wiggles toes), face symmetric, tongue midline, no drift, fully AG x4 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 Pt. 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 Pt. had COVID-19 infection on 2/2022 with symptoms of cough and sore throat - recovered at home. Pt. denies recent fever, chills, chest pain, SOB, changes in bowel/urinary habits. Pt. scheduled to see Dr. Bowens pre-op for baseline audiology and cranial nerve function-has appointment on 9/2/22    HOSPITAL COURSE: admitted ON, CPAP, cuff leak, following commands, dc prop, precedex     24 HOUR EVENTS: s/p RIGHT retrosig crani in lateral position for resection of meningioma with intraop monitoring, synaptive nagivation.      SEDATION SCORES:  RASS: -3 CAM-ICU:     REVIEW OF SYSTEMS: unobtainable, pt intubated    ALLERGIES: Allergies    No Known Allergies    Intolerances        VITALS/DATA/ORDERS: [x] Reviewed    DEVICES:   [] Restraints [x] PIVs [] ET tube [] central line [] PICC [x] arterial line [x] harris [] NGT/OGT [] EVD [] LD [] SAMMY/HMV [] LiCOX [] ICP monitor [] Trach [] PEG [] Chest Tube [] other:    EXAMINATION:  General: mild acute distress intubated  HEENT: Anicteric sclerae  Cardiac: [id number] tachycardic  Lungs: Clear, no wheezing  Abdomen: Soft, non-tender, +BS  Extremities: No c/c/e  Skin/Incision Site: Clean, dry and intact  Neurologic: Awake, alert, difficulty abducting the right eye, bilat 4th, 6th and 3rd palsy on the right, +gag, +cough  PERRL 3 mm bilat, follows commands in all 4 ext (thumbs up, squeezes hands, rises legs, wiggles toes), face symmetric, tongue midline, no drift, fully AG x4

## 2022-09-08 NOTE — PHYSICAL THERAPY INITIAL EVALUATION ADULT - PERTINENT HX OF CURRENT PROBLEM, REHAB EVAL
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 Pt. presents to [facility] for above procedure. CT head 9/8, multifocal pneumocephalus, mild ventricular dilatation. 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 Pt. presents to [facility] for above procedure. CT head 9/8, multifocal pneumocephalus, mild ventricular dilatation. CT Head 9/8, multifocal pneumocephalus, mild ventricular dilatation.

## 2022-09-08 NOTE — PROGRESS NOTE ADULT - ASSESSMENT
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 Pt. 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 Pt. had COVID-19 infection on 2/2022 with symptoms of cough and sore throat - recovered at home. Pt. denies recent fever, chills, chest pain, SOB, changes in bowel/urinary habits. Pt. scheduled to see Dr. Bowens pre-op for baseline audiology and cranial nerve function-has appointment on 9/2/22          NEURO:  [id number]  CTH postop looks stable  CT Head in AM, MRI post-op, no drains per NSGY, Pain control fent pushes prn, gtt if no improvement ( shivering, tachycardic, triggering vent w peak press)  sedation w precedex prn, wean propofol as tolerated  Activity: [] OOB as tolerated [x] Bedrest [] PT [] OT [] PMNR    PULM: cont vent settings prvc 12/450/5/40%  cxr w ett above clavicles, after 2 adjustments at level of clavicles, w normal peak press and sat 100%, monitor  wean to ext as tolerated, breathing trials      CV:  Keep -150mmHg, L  radial a-line   cardene PRN  2 18 gauge IVs    RENAL: monitor UOP, per anesthesia put out >8L, received DDAVP  IVF until good PO intake, d/c harris in AM    GI: keep npo anticipating extubation in am  Diet: when able, Dysphagia screen and then advance diet as tolerated  GI prophylaxis [] not indicated [x] PPI while intubated  Bowel regimen [] colace [] senna [] other:    ENDO: monitor glucose  Goal euglycemia (-180)    HEME/ONC: check LED  VTE prophylaxis: [x] SCDs [] chemoprophylaxis [] hold chemoprophylaxis due to: POD0    ID: afebrile  periop ancef      MISC:    SOCIAL/FAMILY:  [] awaiting [x] updated father at bedside [] family meeting    CODE STATUS:  [x] Full Code [] DNR [] DNI [] Palliative/Comfort Care    DISPOSITION:  [x] ICU [] Stroke Unit [] Floor [] EMU [] RCU [] PCU          Contact: [phone] ASSESSMENT:   35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 Pt. 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 Pt. had COVID-19 infection on 2/2022 with symptoms of cough and sore throat - recovered at home. Pt. denies recent fever, chills, chest pain, SOB, changes in bowel/urinary habits. Pt. scheduled to see Dr. Bwoens pre-op for baseline audiology and cranial nerve function-has appointment on 9/2/22      NEURO:  [id number]  CTH postop->stable   CT AM->no hemorrhage, stable  Off of propofol, weaned precedex   Activity: [] OOB as tolerated [x] Bedrest [] PT [] OT [] PMNR    PULM: cont vent settings prvc 12/450/5/40%  -plan for extubation today 9/8  -(+) cuff leak, CPAP tolerating 5/5, off of prop and precedex, following commands     CV:  Keep -150mmHg, L  radial a-line     RENAL: monitor UOP, per anesthesia put out >8L, received DDAVP  IVF until good PO intake, d/c harris in AM  BMP AM     GI: bedside swallow after extubation   Diet: when able, Dysphagia screen and then advance diet as tolerated  GI prophylaxis [] not indicated [x] PPI while intubated  Bowel regimen [] colace [] senna [] other:    ENDO: monitor glucose  Goal euglycemia (-180)    HEME/ONC: check LED  VTE prophylaxis: [x] SCDs [] chemoprophylaxis [] hold chemoprophylaxis due to: POD0  -start chemoppx on 9/9     ID: afebrile  -afebrile     MISC: x    SOCIAL/FAMILY:  [] awaiting [x] updated father at bedside [] family meeting    CODE STATUS:  [x] Full Code [] DNR [] DNI [] Palliative/Comfort Care    DISPOSITION:  [x] ICU [] Stroke Unit [] Floor [] EMU [] RCU [] PCU          Contact: [phone]

## 2022-09-08 NOTE — PROGRESS NOTE ADULT - SUBJECTIVE AND OBJECTIVE BOX
Patient seen and examined at bedside.    --Anticoagulation--    T(C): 36.3 (09-07-22 @ 23:00), Max: 36.5 (09-07-22 @ 06:04)  HR: 64 (09-08-22 @ 01:30) (64 - 122)  BP: 117/81 (09-07-22 @ 07:06) (117/81 - 117/81)  RR: 16 (09-08-22 @ 01:30) (13 - 30)  SpO2: 100% (09-08-22 @ 01:30) (98% - 100%)  Wt(kg): --    Exam:  Intubated, 3R b/l, limited EOM, some nystagmus, FC, HAYWARD spont/AG

## 2022-09-08 NOTE — PROGRESS NOTE ADULT - ASSESSMENT
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 Pt. 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 Pt. had COVID-19 infection on 2/2022 with symptoms of cough and sore throat - recovered at home. Pt. denies recent fever, chills, chest pain, SOB, changes in bowel/urinary habits. Pt. scheduled to see Dr. Bowens pre-op for baseline audiology and cranial nerve function-has appointment on 9/2/22          NEURO:  [id number]  CTH postop looks stable  CT Head in AM, MRI post-op, no drains per NSGY, Pain control fent pushes prn, gtt if no improvement ( shivering, tachycardic, triggering vent w peak press)  sedation w precedex prn, wean propofol as tolerated  Activity: [] OOB as tolerated [x] Bedrest [] PT [] OT [] PMNR    PULM: cont vent settings prvc 12/450/5/40%  cxr w ett above clavicles, after 2 adjustments at level of clavicles, w normal peak press and sat 100%, monitor  wean to ext as tolerated, breathing trials      CV:  Keep -150mmHg, L  radial a-line   cardene PRN  2 18 gauge IVs    RENAL: monitor UOP, per anesthesia put out >8L, received DDAVP  IVF until good PO intake, d/c harris in AM    GI: keep npo anticipating extubation in am  Diet: when able, Dysphagia screen and then advance diet as tolerated  GI prophylaxis [] not indicated [x] PPI while intubated  Bowel regimen [] colace [] senna [] other:    ENDO: monitor glucose  Goal euglycemia (-180)    HEME/ONC: check LED  VTE prophylaxis: [x] SCDs [] chemoprophylaxis [] hold chemoprophylaxis due to: POD0    ID: afebrile  periop ancef      MISC:    SOCIAL/FAMILY:  [] awaiting [x] updated father at bedside [] family meeting    CODE STATUS:  [x] Full Code [] DNR [] DNI [] Palliative/Comfort Care    DISPOSITION:  [x] ICU [] Stroke Unit [] Floor [] EMU [] RCU [] PCU          Contact: [phone] ASSESSMENT:   37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 Pt. 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 Pt. had COVID-19 infection on 2/2022 with symptoms of cough and sore throat - recovered at home. Pt. denies recent fever, chills, chest pain, SOB, changes in bowel/urinary habits. Pt. scheduled to see Dr. Bowens pre-op for baseline audiology and cranial nerve function-has appointment on 9/2/22          NEURO:  [id number]  CTH postop looks stable  CT Head in AM reviewed, MRI post-op, no drains per NSGY, Pain control    Activity: [] OOB as tolerated [] Bedrest [x] PT [x] OT [] PMNR    PULM: RA  IS      CV:  Keep -150mmHg, L  radial a-line   2 18 gauge IVs    RENAL:   IVF until good PO intake, straight cath  Na normal    GI: keep npo given concerns w swallowing  Diet: S&S  GI prophylaxis [] not indicated [x] PPI while intubated  Bowel regimen     ENDO: monitor glucose  Goal euglycemia (-180)    HEME/ONC: check LED  VTE prophylaxis: [x] SCDs [] chemoprophylaxis [] hold chemoprophylaxis due to: POD0    ID: afebrile  periop ancef      MISC:    SOCIAL/FAMILY:  [] awaiting [x] updated father at bedside [] family meeting    CODE STATUS:  [x] Full Code [] DNR [] DNI [] Palliative/Comfort Care    DISPOSITION:  [x] ICU [] Stroke Unit [] Floor [] EMU [] RCU [] PCU          Contact: [phone] ASSESSMENT:   35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 Pt. 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 Pt. had COVID-19 infection on 2/2022 with symptoms of cough and sore throat - recovered at home. Pt. denies recent fever, chills, chest pain, SOB, changes in bowel/urinary habits. Pt. scheduled to see Dr. Bowens pre-op for baseline audiology and cranial nerve function-has appointment on 9/2/22          NEURO:  [id number] at night for sleep  CTH postop looks stable  CT Head in AM reviewed, MRI post-op, no drains per NSGY, Pain control , try oxy prn  needs to cover R eye, consider gold weight eval  Activity: [] OOB as tolerated [] Bedrest [x] PT [x] OT [] PMNR    PULM: RA  IS      CV:  Keep -150mmHg, L  radial a-line   2 18 gauge IVs    RENAL:   IVF until good PO intake, straight cath  Na normal    GI: keep npo given concerns w swallowing  Diet: S&S  GI prophylaxis [] not indicated [x] PPI while intubated  Bowel regimen     ENDO: monitor glucose  Goal euglycemia (-180)    HEME/ONC: check LED  VTE prophylaxis: [x] SCDs [] chemoprophylaxis [] hold chemoprophylaxis due to: POD0    ID: afebrile  periop ancef      MISC:    SOCIAL/FAMILY:  [] awaiting [x] updated father at bedside [] family meeting    CODE STATUS:  [x] Full Code [] DNR [] DNI [] Palliative/Comfort Care    DISPOSITION:  [x] ICU [] Stroke Unit [] Floor [] EMU [] RCU [] PCU          Contact: [phone]

## 2022-09-08 NOTE — PHYSICAL THERAPY INITIAL EVALUATION ADULT - ADDITIONAL COMMENTS
as per pt, resides in an elevated APT with spouse and new born, +ramp to enter, PTA, pt amb (I), (I) with ADLs.

## 2022-09-08 NOTE — PROGRESS NOTE ADULT - SUBJECTIVE AND OBJECTIVE BOX
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 Pt. 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 Pt. had COVID-19 infection on 2/2022 with symptoms of cough and sore throat - recovered at home. Pt. denies recent fever, chills, chest pain, SOB, changes in bowel/urinary habits. Pt. scheduled to see Dr. Bowens pre-op for baseline audiology and cranial nerve function-has appointment on 9/2/22    HOSPITAL COURSE:  s/p RIGHT retrosig crani in lateral position for resection of meningioma with intraop monitoring, synaptive nagivation.    24 HOUR EVENTS: extubated, w slight emesis during extubation, needs S&S      SEDATION SCORES:  RASS: -3 CAM-ICU:     REVIEW OF SYSTEMS: unobtainable, pt intubated    ALLERGIES: Allergies    No Known Allergies    Intolerances        VITALS/DATA/ORDERS: [x] Reviewed    DEVICES:   [] Restraints [x] PIVs [] ET tube [] central line [] PICC [x] arterial line [x] harris [] NGT/OGT [] EVD [] LD [] SAMMY/HMV [] LiCOX [] ICP monitor [] Trach [] PEG [] Chest Tube [] other:    EXAMINATION:  General: mild acute distress intubated  HEENT: Anicteric sclerae  Cardiac: [id number] tachycardic  Lungs: Clear, no wheezing  Abdomen: Soft, non-tender, +BS  Extremities: No c/c/e  Skin/Incision Site: Clean, dry and intact  Neurologic: Awake, alert, w horizontal nystagmus, bilat 4th, 6th and 3rd palsies, PERRL 3 mm bilat, follows commands in all 4 ext (thumbs up, squeezes hands, rises legs, wiggles toes), face symmetric, tongue midline, no drift, fully AG x4 SUMMARY: 37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 Pt. 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 Pt. had COVID-19 infection on 2/2022 with symptoms of cough and sore throat - recovered at home. Pt. denies recent fever, chills, chest pain, SOB, changes in bowel/urinary habits. Pt. scheduled to see Dr. Bowens pre-op for baseline audiology and cranial nerve function-has appointment on 9/2/22    HOSPITAL COURSE:  s/p RIGHT retrosig crani in lateral position for resection of meningioma with intraop monitoring, synaptive nagivation.    24 HOUR EVENTS: extubated, w slight emesis during extubation, needs S&S      SEDATION SCORES:  RASS: -3 CAM-ICU:     REVIEW OF SYSTEMS: unobtainable, pt intubated    ALLERGIES: Allergies    No Known Allergies    Intolerances        VITALS/DATA/ORDERS: [x] Reviewed    DEVICES:   [] Restraints [x] PIVs [] ET tube [] central line [] PICC [x] arterial line  harris [] NGT/OGT [] EVD [] LD [] SAMMY/HMV [] LiCOX [] ICP monitor [] Trach [] PEG [] Chest Tube [] other:    EXAMINATION:  General: mild acute distress intubated  HEENT: Anicteric sclerae  Cardiac: [id number] tachycardic  Lungs: Clear, no wheezing  Abdomen: Soft, non-tender, +BS  Extremities: No c/c/e  Skin/Incision Site: Clean, dry and intact  Neurologic: Awake, alert, w horizontal nystagmus, bilat 4th, 6th and 3rd palsies, PERRL 3 mm bilat, follows commands in all 4 ext (thumbs up, squeezes hands, rises legs, wiggles toes), face symmetric, tongue midline, no drift, fully AG x4 SUMMARY: 37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 Pt. 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 Pt. had COVID-19 infection on 2/2022 with symptoms of cough and sore throat - recovered at home. Pt. denies recent fever, chills, chest pain, SOB, changes in bowel/urinary habits. Pt. scheduled to see Dr. Bowens pre-op for baseline audiology and cranial nerve function-has appointment on 9/2/22    HOSPITAL COURSE:  s/p RIGHT retrosig crani in lateral position for resection of meningioma with intraop monitoring, synaptive nagivation.    24 HOUR EVENTS: extubated, w slight emesis during extubation, needs S&S      SEDATION SCORES:  RASS: -3 CAM-ICU:     REVIEW OF SYSTEMS: unobtainable, pt intubated    ALLERGIES: Allergies    No Known Allergies    Intolerances        VITALS/DATA/ORDERS: [x] Reviewed    DEVICES:   [] Restraints [x] PIVs [] ET tube [] central line [] PICC [x] arterial line  harris [] NGT/OGT [] EVD [] LD [] SAMMY/HMV [] LiCOX [] ICP monitor [] Trach [] PEG [] Chest Tube [] other:    EXAMINATION:  General: mild acute distress intubated  HEENT: Anicteric sclerae  Cardiac: [id number] tachycardic  Lungs: Clear, no wheezing  Abdomen: Soft, non-tender, +BS  Extremities: No c/c/e  Skin/Incision Site: Clean, dry and intact  Neurologic: Awake, alert ox3, bilat 6th, worse on R, PERRL 3 mm bilat, follows commands in all 4 ext  face w R facial,  tongue pointing to L, no drift, fully AG x4

## 2022-09-08 NOTE — OCCUPATIONAL THERAPY INITIAL EVALUATION ADULT - BALANCE TRAINING, PT EVAL
GOAL: Pt will demonstrate improved static/dynamic balance to I in order to increase safety and independence in ADLs within 2 weeks

## 2022-09-08 NOTE — AIRWAY PLACEMENT NOTE ADULT - MEDICATIONS GIVEN TO FACILATATE INTUBATION
Patient intubated in the OR by the OR physician, patient arrived to NSCU via the OR team and placed on the ventilator with no cardio-pulmonary complications noted at this time. All alarms are working and functioning properly.
Patient intubated in the OR and arrived to NSCU via the OR team and placed on the ventilator with no cardio-pulmonary complications noted at this time. All alarms are working and functioning properly.

## 2022-09-08 NOTE — OCCUPATIONAL THERAPY INITIAL EVALUATION ADULT - LIVES WITH, PROFILE
Pt lives with  and child in apartment, ramp and elevator access, tub in bathroom. Pt I in ADLs and ambulation prior to admission

## 2022-09-09 PROCEDURE — 70553 MRI BRAIN STEM W/O & W/DYE: CPT | Mod: 26

## 2022-09-09 PROCEDURE — 99233 SBSQ HOSP IP/OBS HIGH 50: CPT

## 2022-09-09 PROCEDURE — 93970 EXTREMITY STUDY: CPT | Mod: 26

## 2022-09-09 RX ORDER — OXYCODONE HYDROCHLORIDE 5 MG/1
5 TABLET ORAL EVERY 6 HOURS
Refills: 0 | Status: DISCONTINUED | OUTPATIENT
Start: 2022-09-09 | End: 2022-09-13

## 2022-09-09 RX ORDER — ONDANSETRON 8 MG/1
4 TABLET, FILM COATED ORAL ONCE
Refills: 0 | Status: COMPLETED | OUTPATIENT
Start: 2022-09-09 | End: 2022-09-09

## 2022-09-09 RX ORDER — OXYCODONE HYDROCHLORIDE 5 MG/1
5 TABLET ORAL ONCE
Refills: 0 | Status: DISCONTINUED | OUTPATIENT
Start: 2022-09-09 | End: 2022-09-09

## 2022-09-09 RX ORDER — ENOXAPARIN SODIUM 100 MG/ML
40 INJECTION SUBCUTANEOUS
Refills: 0 | Status: DISCONTINUED | OUTPATIENT
Start: 2022-09-09 | End: 2022-09-13

## 2022-09-09 RX ORDER — ACETAMINOPHEN 500 MG
1000 TABLET ORAL ONCE
Refills: 0 | Status: COMPLETED | OUTPATIENT
Start: 2022-09-09 | End: 2022-09-09

## 2022-09-09 RX ORDER — OXYCODONE HYDROCHLORIDE 5 MG/1
10 TABLET ORAL EVERY 6 HOURS
Refills: 0 | Status: DISCONTINUED | OUTPATIENT
Start: 2022-09-09 | End: 2022-09-13

## 2022-09-09 RX ADMIN — Medication 1000 MILLIGRAM(S): at 01:47

## 2022-09-09 RX ADMIN — OXYCODONE HYDROCHLORIDE 5 MILLIGRAM(S): 5 TABLET ORAL at 03:30

## 2022-09-09 RX ADMIN — CHLORHEXIDINE GLUCONATE 1 APPLICATION(S): 213 SOLUTION TOPICAL at 21:44

## 2022-09-09 RX ADMIN — OXYCODONE HYDROCHLORIDE 5 MILLIGRAM(S): 5 TABLET ORAL at 00:47

## 2022-09-09 RX ADMIN — Medication 6 MILLIGRAM(S): at 12:22

## 2022-09-09 RX ADMIN — OXYCODONE HYDROCHLORIDE 5 MILLIGRAM(S): 5 TABLET ORAL at 01:47

## 2022-09-09 RX ADMIN — Medication 400 MILLIGRAM(S): at 00:47

## 2022-09-09 RX ADMIN — Medication 400 MILLIGRAM(S): at 08:30

## 2022-09-09 RX ADMIN — SODIUM CHLORIDE 60 MILLILITER(S): 9 INJECTION INTRAMUSCULAR; INTRAVENOUS; SUBCUTANEOUS at 07:18

## 2022-09-09 RX ADMIN — Medication 650 MILLIGRAM(S): at 19:18

## 2022-09-09 RX ADMIN — Medication 1000 MILLIGRAM(S): at 08:45

## 2022-09-09 RX ADMIN — Medication 6 MILLIGRAM(S): at 23:23

## 2022-09-09 RX ADMIN — Medication 6 MILLIGRAM(S): at 17:39

## 2022-09-09 RX ADMIN — Medication 1 DROP(S): at 20:48

## 2022-09-09 RX ADMIN — OXYCODONE HYDROCHLORIDE 5 MILLIGRAM(S): 5 TABLET ORAL at 02:31

## 2022-09-09 RX ADMIN — Medication 650 MILLIGRAM(S): at 17:41

## 2022-09-09 RX ADMIN — Medication 6 MILLIGRAM(S): at 06:59

## 2022-09-09 RX ADMIN — SENNA PLUS 2 TABLET(S): 8.6 TABLET ORAL at 21:54

## 2022-09-09 RX ADMIN — ENOXAPARIN SODIUM 40 MILLIGRAM(S): 100 INJECTION SUBCUTANEOUS at 19:47

## 2022-09-09 RX ADMIN — ONDANSETRON 4 MILLIGRAM(S): 8 TABLET, FILM COATED ORAL at 00:47

## 2022-09-09 NOTE — DIETITIAN INITIAL EVALUATION ADULT - NSFNSGIIOFT_GEN_A_CORE
-No documented BM's recorded thus far. On bowel regimen (Miralax, senna).   -Continues on Decadron as prescribed; at risk for elevated FSBG. Not on antihyperglycemic regimen.   -Continues on prenatal multivitamin.  23:56 07:25

## 2022-09-09 NOTE — SPEECH LANGUAGE PATHOLOGY EVALUATION - SLP GENERAL OBSERVATIONS
Patient encountered at bedside, sitting upright in bed. IV in place, telemonitoring in place, R eye patch in place.

## 2022-09-09 NOTE — DIETITIAN INITIAL EVALUATION ADULT - REASON FOR ADMISSION
Pt is a 35 yo F reported with diplopia after giving birth to her child on 6/7/22, later noticing decreased hearing on the right side. Found to have a brain mass with brainstem compression, suspicious of a meningioma. Now s/p R retrosig crani in lateral position for resection of meningioma. Extubated 9/8.

## 2022-09-09 NOTE — SWALLOW BEDSIDE ASSESSMENT ADULT - SWALLOW EVAL: SECRETION MANAGEMENT
patient has been self-suctioning for mild increase in oral secretions, however no difficulty noted clearing secretions when prompted

## 2022-09-09 NOTE — PROGRESS NOTE ADULT - SUBJECTIVE AND OBJECTIVE BOX
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 Pt. 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 Pt. had COVID-19 infection on 2/2022 with symptoms of cough and sore throat - recovered at home. Pt. denies recent fever, chills, chest pain, SOB, changes in bowel/urinary habits. Pt. scheduled to see Dr. Bowens pre-op for baseline audiology and cranial nerve function-has appointment on 9/2/22    HOSPITAL COURSE:  s/p RIGHT retrosig crani in lateral position for resection of meningioma with intraop monitoring, synaptive nagivation.    24 HOUR EVENTS: extubated, w slight emesis during extubation, needs S&S      SEDATION SCORES:  RASS: -3 CAM-ICU:     REVIEW OF SYSTEMS: unobtainable, pt intubated    ALLERGIES: Allergies    No Known Allergies    Intolerances        VITALS/DATA/ORDERS: [x] Reviewed    DEVICES:   [] Restraints [x] PIVs [] ET tube [] central line [] PICC [x] arterial line  harris [] NGT/OGT [] EVD [] LD [] SAMMY/HMV [] LiCOX [] ICP monitor [] Trach [] PEG [] Chest Tube [] other:    EXAMINATION:  General: mild acute distress intubated  HEENT: Anicteric sclerae  Cardiac: [id number] tachycardic  Lungs: Clear, no wheezing  Abdomen: Soft, non-tender, +BS  Extremities: No c/c/e  Skin/Incision Site: Clean, dry and intact  Neurologic: Awake, alert ox3, bilat 6th, worse on R, PERRL 3 mm bilat, follows commands in all 4 ext  face w R facial,  tongue pointing to L, no drift, fully AG x4 SUMMARY: 35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 Pt. 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 Pt. had COVID-19 infection on 2/2022 with symptoms of cough and sore throat - recovered at home. Pt. denies recent fever, chills, chest pain, SOB, changes in bowel/urinary habits. Pt. scheduled to see Dr. Bowens pre-op for baseline audiology and cranial nerve function-has appointment on 9/2/22    24 HOUR EVENTS:       SEDATION SCORES:  RASS: -3 CAM-ICU:     REVIEW OF SYSTEMS: unobtainable, pt intubated    ALLERGIES: Allergies    No Known Allergies    Intolerances        VITALS/DATA/ORDERS: [x] Reviewed    DEVICES:   [] Restraints [x] PIVs [] ET tube [] central line [] PICC [x] arterial line  harris [] NGT/OGT [] EVD [] LD [] SAMMY/HMV [] LiCOX [] ICP monitor [] Trach [] PEG [] Chest Tube [] other:    EXAMINATION:  General: mild acute distress intubated  HEENT: Anicteric sclerae  Cardiac: [id number] tachycardic  Lungs: Clear, no wheezing  Abdomen: Soft, non-tender, +BS  Extremities: No c/c/e  Skin/Incision Site: Clean, dry and intact  Neurologic: Awake, alert ox3, bilat 6th, 7th worse on R, PERRL 3 mm bilat, follows commands in all 4 ext  face w R facial,  tongue pointing to L, no drift, fully AG x4 SUMMARY: 35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 Pt. 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 Pt. had COVID-19 infection on 2/2022 with symptoms of cough and sore throat - recovered at home. Pt. denies recent fever, chills, chest pain, SOB, changes in bowel/urinary habits. Pt. scheduled to see Dr. Bowens pre-op for baseline audiology and cranial nerve function-has appointment on 9/2/22    ON: patient endorsed increased secretions, chest xray negative, sating well      REVIEW OF SYSTEMS: unobtainable, pt intubated    ALLERGIES: Allergies    No Known Allergies    VITALS/DATA/ORDERS: [x] Reviewed    EXAMINATION:  General: mild acute distress intubated  HEENT: Anicteric sclerae  Cardiac: [id number] tachycardic  Lungs: Clear, no wheezing  Abdomen: Soft, non-tender, +BS  Extremities: No c/c/e  Skin/Incision Site: Clean, dry and intact  Neurologic: Awake, alert ox3, bilat 6th, 7th palsy on the right, PERRL 3 mm bilat, follows commands in all 4 ext  face w R facial,  tongue pointing to L, no drift, fully AG x4

## 2022-09-09 NOTE — DIETITIAN INITIAL EVALUATION ADULT - ETIOLOGY
increased demand for nutrient in the setting of brain injury, neurosurgical intervention  increased demand for nutrient in the setting of brain injury, neurosurgical intervention + breast feeding

## 2022-09-09 NOTE — DIETITIAN INITIAL EVALUATION ADULT - PERTINENT LABORATORY DATA
09-08    142  |  105  |  13  ----------------------------<  138<H>  4.1   |  23  |  0.42<L>    Ca    9.6      08 Sep 2022 21:17  Phos  3.3     09-08  Mg     2.2     09-08

## 2022-09-09 NOTE — SPEECH LANGUAGE PATHOLOGY EVALUATION - SLP DIAGNOSIS
35 y/o F, s/p Right Retrosigmoid Craniotomy for Skull Base Meningioma now being evaluated for speech and language. Patient presents with a mild dysarthria as described by reduced speech intelligibility, reduced labial/lingual/buccal strength on the right side, and reduced articulatory precision. Patient's voice is slightly hoarse, however extubation 9/8 with vomiting upon extubation, continue to monitor vocal quality if does not improve consider referral to ENT. Patient presents with cognitive and language function WFL.

## 2022-09-09 NOTE — DIETITIAN INITIAL EVALUATION ADULT - OTHER INFO
Dosing wt (9/7): 125.6 lbs Dosing wt (9/7): 125.6 lbs  Pt reports UBW: 120-128 lbs. Appears consistent, will monitor/trend.

## 2022-09-09 NOTE — SWALLOW BEDSIDE ASSESSMENT ADULT - SWALLOW EVAL: DIAGNOSIS
37 y/o F, s/p Right Retrosigmoid Craniotomy for Skull Base Meningioma, extubated 9/8, now being evaluated for dysphagia. Since extubation 9/8, patient c/o difficulty swallowing. Per RN Neelam, patient with burping and coughing and concerns about aspiration event following vomiting during extubation yesterday. Oral dysphagia characterized by lateralization of bolus to left side (due to right sided weakness), however full clearance of bolus achieved. Patient's dysphagia observed on liquids characterized by multiple swallows (x2-3 per liquid bolus), immediate throat clear with burp following all liquid consistencies. patient endorses feeling of air getting stuck after swallow of liquids. No improvement noted with trialed L head turn, change of quantity (tspn vs. cup sip), or consistency (thin liquid vs. mildly thick vs. moderately thick). Following each burping/throat clearing episode, patient's vocal quality immediately clear.

## 2022-09-09 NOTE — PROGRESS NOTE ADULT - ASSESSMENT
Keep -150  CN 5-12, 9 cut on right side  Pseudomeningocele watch  Left side motors dec. intra-op but returned  Given ddavp 0.3mcg/kg intra-op    MRI 48hrs  Consider Gold Weight

## 2022-09-09 NOTE — SPEECH LANGUAGE PATHOLOGY EVALUATION - SLP PERTINENT HISTORY OF CURRENT PROBLEM
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 Pt. 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 Pt. had COVID-19 infection on 2/2022 with symptoms of cough and sore throat - recovered at home. Pt. denies recent fever, chills, chest pain, SOB, changes in bowel/urinary habits. Pt. scheduled to see Dr. Bowens pre-op for baseline audiology and cranial nerve function-has appointment on 9/2/22. s/p RIGHT retrosig crani in lateral position for resection of meningioma with intraop monitoring, synaptive nagivation.

## 2022-09-09 NOTE — DIETITIAN INITIAL EVALUATION ADULT - ADD RECOMMEND
1. Continue PO diet as ordered; defer consistency to medical team, SLP.   2. Encourage and monitor PO intake. Encourage use of daily menus. Honor dietary preferences as expressed.   3. Continue prenatal vitamin as ordered.   4. Monitor wt trends/labs/skin integrity/hydration status/bowel regularity.

## 2022-09-09 NOTE — PROGRESS NOTE ADULT - SUBJECTIVE AND OBJECTIVE BOX
Patient seen and examined at bedside.    --Anticoagulation--    T(C): 36.9 (09-08-22 @ 23:00), Max: 36.9 (09-08-22 @ 07:00)  HR: 71 (09-08-22 @ 23:00) (54 - 94)  BP: 124/92 (09-08-22 @ 14:00) (124/92 - 124/92)  RR: 22 (09-08-22 @ 23:00) (13 - 25)  SpO2: 96% (09-08-22 @ 23:00) (95% - 100%)  Wt(kg): --    Exam:  3R b/l, EOMI, FC, HAYWARD spont/AG

## 2022-09-09 NOTE — SPEECH LANGUAGE PATHOLOGY EVALUATION - COMMENTS
did not assess Auditory comprehension is WFL. Verbal expression is WFL. Cognitive-linguistic function WFL. Patient is s/p extubation 9/8, presenting with slightly hoarse vocal quality. Continue to monitor for improvement, and consider ENT referral if does not improve. Patient presents with a mild dysarthria.

## 2022-09-09 NOTE — DIETITIAN INITIAL EVALUATION ADULT - ORAL INTAKE PTA/DIET HISTORY
Pt reports good appetite PTA, consumes on average 2-3 meals daily and enjoys a variety of different foods (specifically stated seafood and pasta, no pork). Denies prior intolerance to chewing/swallowing, denies food allergies. Takes prenatal vitamin (gave birth in June 2022). Denies N/V/C/diarrhea.

## 2022-09-09 NOTE — PROGRESS NOTE ADULT - ASSESSMENT
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 Pt. 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 Pt. had COVID-19 infection on 2/2022 with symptoms of cough and sore throat - recovered at home. Pt. denies recent fever, chills, chest pain, SOB, changes in bowel/urinary habits. Pt. scheduled to see Dr. Bowens pre-op for baseline audiology and cranial nerve function-has appointment on 9/2/22          NEURO:  [id number] at night for sleep  CTH postop looks stable  CT Head in AM reviewed, MRI post-op, no drains per NSGY, Pain control , try oxy prn  needs to cover R eye, consider gold weight eval  Activity: [] OOB as tolerated [] Bedrest [x] PT [x] OT [] PMNR    PULM: RA  IS      CV:  Keep -150mmHg, L  radial a-line   2 18 gauge IVs    RENAL:   IVF until good PO intake, straight cath  Na normal    GI: keep npo given concerns w swallowing  Diet: S&S  GI prophylaxis [] not indicated [x] PPI while intubated  Bowel regimen     ENDO: monitor glucose  Goal euglycemia (-180)    HEME/ONC: check LED  VTE prophylaxis: [x] SCDs [] chemoprophylaxis [] hold chemoprophylaxis due to: POD0    ID: afebrile  periop ancef      MISC:    SOCIAL/FAMILY:  [] awaiting [x] updated father at bedside [] family meeting    CODE STATUS:  [x] Full Code [] DNR [] DNI [] Palliative/Comfort Care    DISPOSITION:  [x] ICU [] Stroke Unit [] Floor [] EMU [] RCU [] PCU          Contact: [phone] ASSESSMENT:   37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 Pt. 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 Pt. had COVID-19 infection on 2/2022 with symptoms of cough and sore throat - recovered at home. Pt. denies recent fever, chills, chest pain, SOB, changes in bowel/urinary habits. Pt. scheduled to see Dr. Bowens pre-op for baseline audiology and cranial nerve function-has appointment on 9/2/22      NEURO:  [id number] at night for sleep  CTH postop looks stable  MRI of the brain   needs to cover R eye, consider gold weight eval  Activity: [] OOB as tolerated [] Bedrest [x] PT [x] OT [] PMNR    PULM: RA  Extubated with vomiting around ET  Xray completed negative   Sating well     CV:  Keep -150mmHg, L  radial a-line   2 18 gauge IVs  DC A line     RENAL:   IVF until good PO intake, straight cath  Na normal    GI:   Diet: passed, regular diet   GI prophylaxis [] not indicated [x] PPI while intubated  Bowel regimen     ENDO: monitor glucose  Goal euglycemia (-180)    HEME/ONC: check LED  VTE prophylaxis: [x] SCDs [] chemoprophylaxis [] hold chemoprophylaxis  -start lovenox     ID: afebrile    MISC:    SOCIAL/FAMILY:  [] awaiting [x] updated father at bedside [] family meeting    CODE STATUS:  [x] Full Code [] DNR [] DNI [] Palliative/Comfort Care    DISPOSITION:  [x] ICU [] Stroke Unit [] Floor [] EMU [] RCU [] PCU          Contact: [phone] ASSESSMENT:   37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 Pt. 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 Pt. had COVID-19 infection on 2/2022 with symptoms of cough and sore throat - recovered at home. Pt. denies recent fever, chills, chest pain, SOB, changes in bowel/urinary habits. Pt. scheduled to see Dr. Bowens pre-op for baseline audiology and cranial nerve function-has appointment on 9/2/22    NEURO:  [id number] at night for sleep  CTH postop looks stable  MRI of the brain   needs to cover R eye, consider gold weight eval  Activity: [] OOB as tolerated [] Bedrest [x] PT [x] OT [] PMNR    PULM: RA  Extubated with vomiting around ET  Xray completed negative   Sating well     CV:  Keep -150mmHg  DC A line     RENAL:   IVF until good PO intake, straight cath  Na normal    GI:   Diet: passed, regular diet   GI prophylaxis [] not indicated [x] PPI while intubated  Bowel regimen     ENDO: monitor glucose  Goal euglycemia (-180)    HEME/ONC: check LED  VTE prophylaxis: [x] SCDs [] chemoprophylaxis [] hold chemoprophylaxis  -start lovenox     ID: afebrile    MISC:    SOCIAL/FAMILY:  [] awaiting [x] updated father at bedside [] family meeting    CODE STATUS:  [x] Full Code [] DNR [] DNI [] Palliative/Comfort Care    DISPOSITION:  [x] ICU [] Stroke Unit [] Floor [] EMU [] RCU [] PCU          Contact: [phone]

## 2022-09-09 NOTE — SWALLOW BEDSIDE ASSESSMENT ADULT - COMMENTS
9/7 Chest xray:  IMPRESSION:    Endotracheal tube tip is at the thoracic inlet, and should be advanced.

## 2022-09-09 NOTE — SWALLOW BEDSIDE ASSESSMENT ADULT - SWALLOW EVAL: FUNCTIONAL LEVEL AT TIME OF EVAL
Diagnosis continued: Suggest pharyngeal dysphagia more likely indicative of possible discoordination of breathing/swallowing assosicated with some fear, with adequate sensation vs. penetration/aspiration. Given recent chest x-ray findings (clear lungs), and patient's ability to utilize strategies for safe swallowing/follow directions for small sips, suggest that least restrictive diet at this time should be maintained with aspiration precautions. Suggest repeat chest x-ray might be warranted. Suspect patient will experience improvement in swallow function as patient continues to recover. This service to follow for tolerance. ÁNGELA Richter and CHRISTIAN Kim both in agreement. If change in status, this service will be consulted over the weekend. Diagnosis continued: Suggest pharyngeal dysphagia more likely indicative of possible discoordination of breathing/swallowing associated with some anticipation, with adequate sensation vs. penetration/aspiration. Given recent chest x-ray findings (clear lungs), and patient's ability to utilize strategies for safe swallowing/follow directions for small sips, suggest that least restrictive diet at this time should be maintained with aspiration precautions. Suggest repeat chest x-ray might be warranted. Suspect patient will experience improvement in swallow function as patient continues to recover. This service to follow for tolerance. ÁNGELA Richter and CHRISTIAN Kim both in agreement. If change in status, this service will be consulted over the weekend.

## 2022-09-09 NOTE — DIETITIAN INITIAL EVALUATION ADULT - PERTINENT MEDS FT
MEDICATIONS  (STANDING):  ceFAZolin   IVPB 2000 milliGRAM(s) IV Intermittent once  chlorhexidine 4% Liquid 1 Application(s) Topical <User Schedule>  dexAMETHasone  Injectable 6 milliGRAM(s) IV Push every 6 hours  polyethylene glycol 3350 17 Gram(s) Oral every 12 hours  prenatal multivitamin 1 Tablet(s) Oral daily  senna 2 Tablet(s) Oral at bedtime  sodium chloride 0.9%. 1000 milliLiter(s) (60 mL/Hr) IV Continuous <Continuous>    MEDICATIONS  (PRN):  acetaminophen     Tablet .. 650 milliGRAM(s) Oral every 6 hours PRN Temp greater or equal to 38C (100.4F), Mild Pain (1 - 3)  artificial  tears Solution 1 Drop(s) Both EYES every 4 hours PRN Dry Eyes  oxyCODONE    IR 10 milliGRAM(s) Oral every 6 hours PRN Severe Pain (7 - 10)  oxyCODONE    IR 5 milliGRAM(s) Oral every 6 hours PRN discomfort

## 2022-09-10 LAB
ANION GAP SERPL CALC-SCNC: 11 MMOL/L — SIGNIFICANT CHANGE UP (ref 5–17)
BUN SERPL-MCNC: 23 MG/DL — SIGNIFICANT CHANGE UP (ref 7–23)
CALCIUM SERPL-MCNC: 9.7 MG/DL — SIGNIFICANT CHANGE UP (ref 8.4–10.5)
CHLORIDE SERPL-SCNC: 103 MMOL/L — SIGNIFICANT CHANGE UP (ref 96–108)
CO2 SERPL-SCNC: 27 MMOL/L — SIGNIFICANT CHANGE UP (ref 22–31)
CREAT SERPL-MCNC: 0.44 MG/DL — LOW (ref 0.5–1.3)
EGFR: 128 ML/MIN/1.73M2 — SIGNIFICANT CHANGE UP
GLUCOSE SERPL-MCNC: 101 MG/DL — HIGH (ref 70–99)
HCT VFR BLD CALC: 34.6 % — SIGNIFICANT CHANGE UP (ref 34.5–45)
HGB BLD-MCNC: 11.6 G/DL — SIGNIFICANT CHANGE UP (ref 11.5–15.5)
MCHC RBC-ENTMCNC: 30.4 PG — SIGNIFICANT CHANGE UP (ref 27–34)
MCHC RBC-ENTMCNC: 33.5 GM/DL — SIGNIFICANT CHANGE UP (ref 32–36)
MCV RBC AUTO: 90.8 FL — SIGNIFICANT CHANGE UP (ref 80–100)
NRBC # BLD: 0 /100 WBCS — SIGNIFICANT CHANGE UP (ref 0–0)
PLATELET # BLD AUTO: 259 K/UL — SIGNIFICANT CHANGE UP (ref 150–400)
POTASSIUM SERPL-MCNC: 4.2 MMOL/L — SIGNIFICANT CHANGE UP (ref 3.5–5.3)
POTASSIUM SERPL-SCNC: 4.2 MMOL/L — SIGNIFICANT CHANGE UP (ref 3.5–5.3)
RBC # BLD: 3.81 M/UL — SIGNIFICANT CHANGE UP (ref 3.8–5.2)
RBC # FLD: 13.1 % — SIGNIFICANT CHANGE UP (ref 10.3–14.5)
SODIUM SERPL-SCNC: 141 MMOL/L — SIGNIFICANT CHANGE UP (ref 135–145)
WBC # BLD: 11.08 K/UL — HIGH (ref 3.8–10.5)
WBC # FLD AUTO: 11.08 K/UL — HIGH (ref 3.8–10.5)

## 2022-09-10 RX ORDER — DEXAMETHASONE 0.5 MG/5ML
4 ELIXIR ORAL EVERY 6 HOURS
Refills: 0 | Status: DISCONTINUED | OUTPATIENT
Start: 2022-09-10 | End: 2022-09-13

## 2022-09-10 RX ADMIN — SENNA PLUS 2 TABLET(S): 8.6 TABLET ORAL at 21:40

## 2022-09-10 RX ADMIN — Medication 1 DROP(S): at 07:17

## 2022-09-10 RX ADMIN — Medication 1 DROP(S): at 16:26

## 2022-09-10 RX ADMIN — Medication 1 DROP(S): at 11:17

## 2022-09-10 RX ADMIN — Medication 1 TABLET(S): at 11:28

## 2022-09-10 RX ADMIN — Medication 650 MILLIGRAM(S): at 12:30

## 2022-09-10 RX ADMIN — POLYETHYLENE GLYCOL 3350 17 GRAM(S): 17 POWDER, FOR SOLUTION ORAL at 11:28

## 2022-09-10 RX ADMIN — Medication 4 MILLIGRAM(S): at 18:23

## 2022-09-10 RX ADMIN — ENOXAPARIN SODIUM 40 MILLIGRAM(S): 100 INJECTION SUBCUTANEOUS at 18:24

## 2022-09-10 RX ADMIN — Medication 650 MILLIGRAM(S): at 18:28

## 2022-09-10 RX ADMIN — Medication 650 MILLIGRAM(S): at 19:11

## 2022-09-10 RX ADMIN — Medication 4 MILLIGRAM(S): at 11:28

## 2022-09-10 RX ADMIN — Medication 650 MILLIGRAM(S): at 11:39

## 2022-09-10 RX ADMIN — Medication 4 MILLIGRAM(S): at 23:51

## 2022-09-10 RX ADMIN — Medication 6 MILLIGRAM(S): at 06:12

## 2022-09-10 NOTE — PROGRESS NOTE ADULT - ASSESSMENT
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 Pt. 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 Pt. had COVID-19 infection on 2/2022 with symptoms of cough and sore throat - recovered at home. Pt. denies recent fever, chills, chest pain, SOB, changes in bowel/urinary habits. Pt. scheduled to see Dr. Bowens pre-op for baseline audiology and cranial nerve function-has appointment on 9/2/22    NEURO:  neuro and vital checks q 4  needs to cover R eye, consider gold weight eval (per patient seems to be improving)      PULM: RA  incentive spirometry     CV:  Keep -150mmHg    RENAL:   IVF until good PO intake, straight cath  Na normal    GI:   Diet: passed, regular diet   Bowel regimen needs BM now on miralax and senna     ENDO: monitor glucose  Goal euglycemia (-180)    HEME/ONC: check LED  lovenox and scds     ID: afebrile      spoke to pharmacy, patient able to pump and give baby milk with even while on decadron.  No need to pump and dump.,  Fridge ordered for room while patient is here.

## 2022-09-10 NOTE — PROGRESS NOTE ADULT - SUBJECTIVE AND OBJECTIVE BOX
SUBJECTIVE: Patient seen and examined.  Patient doing well.  Able to ambulate with assistance.  She is post partum 3 months.      Vital Signs Last 24 Hrs  T(C): 37.1 (10 Sep 2022 10:00), Max: 37.1 (10 Sep 2022 06:21)  T(F): 98.7 (10 Sep 2022 10:00), Max: 98.7 (10 Sep 2022 06:21)  HR: 67 (10 Sep 2022 10:00) (54 - 72)  BP: 126/80 (10 Sep 2022 10:00) (123/71 - 146/94)  BP(mean): 110 (09 Sep 2022 15:00) (105 - 110)  RR: 18 (10 Sep 2022 10:00) (15 - 22)  SpO2: 95% (10 Sep 2022 10:00) (95% - 99%)    Parameters below as of 10 Sep 2022 10:00  Patient On (Oxygen Delivery Method): room air        PHYSICAL EXAM:    Constitutional: No Acute Distress     Neurological: Awake, alert ox3, bilat 6th, 7th palsy on the right, PERRL 3 mm bilat, follows commands in all 4 ext  face w R facial,  tongue pointing to L, no drift, fully AG x4    Pulmonary: Clear to Auscultation, No rales, No rhonchi, No wheezes     Cardiovascular: S1, S2, Regular rate and rhythm     Gastrointestinal: Soft, Non-tender, Non-distended     Extremities: No calf tenderness     Incision: c/d/i   LABS:                        11.6   11.08 )-----------( 259      ( 10 Sep 2022 07:16 )             34.6    09-10    141  |  103  |  23  ----------------------------<  101<H>  4.2   |  27  |  0.44<L>    Ca    9.7      10 Sep 2022 07:14  Phos  3.3     09-08  Mg     2.2     09-08 09-09 @ 07:01  -  09-10 @ 07:00  --------------------------------------------------------  IN: 820 mL / OUT: 807 mL / NET: 13 mL      MEDICATIONS:  Anticoagulation:   enoxaparin Injectable 40 milliGRAM(s) SubCutaneous <User Schedule>    Antibiotics:    Endo:  dexAMETHasone     Tablet 4 milliGRAM(s) Oral every 6 hours    Neuro:  acetaminophen     Tablet .. 650 milliGRAM(s) Oral every 6 hours PRN Temp greater or equal to 38C (100.4F), Mild Pain (1 - 3)  oxyCODONE    IR 5 milliGRAM(s) Oral every 6 hours PRN discomfort  oxyCODONE    IR 10 milliGRAM(s) Oral every 6 hours PRN Severe Pain (7 - 10)    Cardiac:    Pulm:    GI/:  polyethylene glycol 3350 17 Gram(s) Oral every 12 hours  senna 2 Tablet(s) Oral at bedtime    Other:   artificial  tears Solution 1 Drop(s) Both EYES every 4 hours PRN Dry Eyes  prenatal multivitamin 1 Tablet(s) Oral daily    DIET: regular     IMAGING:

## 2022-09-11 RX ADMIN — POLYETHYLENE GLYCOL 3350 17 GRAM(S): 17 POWDER, FOR SOLUTION ORAL at 05:18

## 2022-09-11 RX ADMIN — Medication 4 MILLIGRAM(S): at 11:39

## 2022-09-11 RX ADMIN — Medication 1 TABLET(S): at 12:41

## 2022-09-11 RX ADMIN — POLYETHYLENE GLYCOL 3350 17 GRAM(S): 17 POWDER, FOR SOLUTION ORAL at 17:22

## 2022-09-11 RX ADMIN — SENNA PLUS 2 TABLET(S): 8.6 TABLET ORAL at 22:12

## 2022-09-11 RX ADMIN — Medication 4 MILLIGRAM(S): at 23:21

## 2022-09-11 RX ADMIN — Medication 4 MILLIGRAM(S): at 05:13

## 2022-09-11 RX ADMIN — Medication 4 MILLIGRAM(S): at 17:23

## 2022-09-11 RX ADMIN — ENOXAPARIN SODIUM 40 MILLIGRAM(S): 100 INJECTION SUBCUTANEOUS at 17:23

## 2022-09-11 RX ADMIN — Medication 650 MILLIGRAM(S): at 04:33

## 2022-09-11 NOTE — PROGRESS NOTE ADULT - SUBJECTIVE AND OBJECTIVE BOX
SUBJECTIVE:  Patient seen and examined with  at bedside.  Interested in PT/OT at home due to inability to drive and new born at home. Likely DC home tomorrow.     OBJECTIVE: No acute events.    Vital Signs Last 24 Hrs  T(C): 37.1 (10 Sep 2022 10:00), Max: 37.1 (10 Sep 2022 06:21)  T(F): 98.7 (10 Sep 2022 10:00), Max: 98.7 (10 Sep 2022 06:21)  HR: 67 (10 Sep 2022 10:00) (54 - 72)  BP: 126/80 (10 Sep 2022 10:00) (123/71 - 146/94)  BP(mean): 110 (09 Sep 2022 15:00) (105 - 110)  RR: 18 (10 Sep 2022 10:00) (15 - 22)  SpO2: 95% (10 Sep 2022 10:00) (95% - 99%)    Parameters below as of 10 Sep 2022 10:00  Patient On (Oxygen Delivery Method): room air    PHYSICAL EXAM:    Constitutional: No Acute Distress     Neurological: Awake, alert ox3, RT CN6 palsy, RT CN 4 palsy, RT CN7 palsy, PERRL 3 mm bilat, FC, HAYWARD 5/5.     Pulmonary: Clear to Auscultation, No rales, No rhonchi, No wheezes   Cardiovascular: S1, S2, Regular rate and rhythm   Gastrointestinal: Soft, Non-tender, Non-distended   Extremities: No calf tenderness     Incision: stitches intact, no ssx of csf leak/collection.     LABS: No new labs, 9/10 labs stable     MEDICATIONS  (STANDING):  dexAMETHasone     Tablet 4 milliGRAM(s) Oral every 6 hours  enoxaparin Injectable 40 milliGRAM(s) SubCutaneous <User Schedule>  polyethylene glycol 3350 17 Gram(s) Oral every 12 hours  prenatal multivitamin 1 Tablet(s) Oral daily  senna 2 Tablet(s) Oral at bedtime    MEDICATIONS  (PRN):  acetaminophen     Tablet .. 650 milliGRAM(s) Oral every 6 hours PRN Temp greater or equal to 38C (100.4F), Mild Pain (1 - 3)  artificial  tears Solution 1 Drop(s) Both EYES every 4 hours PRN Dry Eyes  oxyCODONE    IR 5 milliGRAM(s) Oral every 6 hours PRN discomfort  oxyCODONE    IR 10 milliGRAM(s) Oral every 6 hours PRN Severe Pain (7 - 10)    IMAGING:     < from: MR Head w/wo IV Cont (09.09.22 @ 16:18) >  IMPRESSION: Post resection of right kareem clinoid meningioma with a small   amount of residual in the prepontine cistern and in the right internal   auditory canal. Resolution of mass effect on the right portion of the   vilma and medulla compared with 9/6/2022.    --- End of Report --- SUBJECTIVE:  Patient seen and examined with  at bedside.  Interested in PT/OT at home due to inability to drive and new born at home. Likely DC home tomorrow.     OBJECTIVE: No acute events.    Vital Signs Last 24 Hrs  T(C): 37.1 (10 Sep 2022 10:00), Max: 37.1 (10 Sep 2022 06:21)  T(F): 98.7 (10 Sep 2022 10:00), Max: 98.7 (10 Sep 2022 06:21)  HR: 67 (10 Sep 2022 10:00) (54 - 72)  BP: 126/80 (10 Sep 2022 10:00) (123/71 - 146/94)  BP(mean): 110 (09 Sep 2022 15:00) (105 - 110)  RR: 18 (10 Sep 2022 10:00) (15 - 22)  SpO2: 95% (10 Sep 2022 10:00) (95% - 99%)    Parameters below as of 10 Sep 2022 10:00  Patient On (Oxygen Delivery Method): room air    PHYSICAL EXAM:    Constitutional: No Acute Distress     Neurological: Awake, alert ox3, RT CN6 palsy, RT CN 4 palsy, RT CN7 palsy, RT CN9 palsy, PERRL 3 mm bilat, FC, HAYWARD 5/5.     Pulmonary: Clear to Auscultation, No rales, No rhonchi, No wheezes   Cardiovascular: S1, S2, Regular rate and rhythm   Gastrointestinal: Soft, Non-tender, Non-distended   Extremities: No calf tenderness     Incision: stitches intact, no ssx of csf leak/collection.     LABS: No new labs, 9/10 labs stable     MEDICATIONS  (STANDING):  dexAMETHasone     Tablet 4 milliGRAM(s) Oral every 6 hours  enoxaparin Injectable 40 milliGRAM(s) SubCutaneous <User Schedule>  polyethylene glycol 3350 17 Gram(s) Oral every 12 hours  prenatal multivitamin 1 Tablet(s) Oral daily  senna 2 Tablet(s) Oral at bedtime    MEDICATIONS  (PRN):  acetaminophen     Tablet .. 650 milliGRAM(s) Oral every 6 hours PRN Temp greater or equal to 38C (100.4F), Mild Pain (1 - 3)  artificial  tears Solution 1 Drop(s) Both EYES every 4 hours PRN Dry Eyes  oxyCODONE    IR 5 milliGRAM(s) Oral every 6 hours PRN discomfort  oxyCODONE    IR 10 milliGRAM(s) Oral every 6 hours PRN Severe Pain (7 - 10)    IMAGING:     < from: MR Head w/wo IV Cont (09.09.22 @ 16:18) >  IMPRESSION: Post resection of right kareem clinoid meningioma with a small   amount of residual in the prepontine cistern and in the right internal   auditory canal. Resolution of mass effect on the right portion of the   vilma and medulla compared with 9/6/2022.    --- End of Report ---

## 2022-09-11 NOTE — PROGRESS NOTE ADULT - ASSESSMENT
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 Pt. 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 Pt. had COVID-19 infection on 2/2022 with symptoms of cough and sore throat - recovered at home. Pt. denies recent fever, chills, chest pain, SOB, changes in bowel/urinary habits. Pt. scheduled to see Dr. Bowens pre-op for baseline audiology and cranial nerve function-has appointment on 9/2/22.    NEURO:  MR reviewed and discussed with patient/.  Cont decadron, will have taper plan upon DC.  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  Can cover eye to help with diploplia.  Defer gold weight eval for now per patient    PULM:  Incentive spirometry as tolerated  RA sats >92%    CV:  Keep -150mmHg    RENAL:   IVL    GI:   tolerating regular diet but has mild concerns with liquids.  Speech to see again tomorrow.  cont bowel regimen    ENDO:   Euglycemia (-180)    HEME/ONC:   LE dopp negative  SCDs, SQL    Dispo: Home with outpatient PT/OT/[facility], working on getting home PT/OT.  OT to see patient tomorrow before potential DC.     Discussed with patient and family wound care, follow up plans, activity, and medications. Questions answered, and they verbalized understanding.    d/w Dr. Lau  [id number]    spoke to pharmacy, patient able to pump and give baby milk with even while on decadron.  No need to pump and dump.,  Fridge ordered for room while patient is here.

## 2022-09-11 NOTE — CHART NOTE - NSCHARTNOTEFT_GEN_A_CORE
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 Pt. 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 Pt. had COVID-19 infection on 2/2022 with symptoms of cough and sore throat - recovered at home. Pt is s/p RIGHT retrosig crani in lateral position for resection of meningioma.     -Neurological: Awake, alert ox3, bilat 6th, 7th palsy on the right, PERRL 3 mm bilat, follows commands in all 4 ext  face w R facial,  tongue pointing to L, no drift, fully AG x4  -ETT 9/7-9/8       TODAY, pt p/w continued difficulty w/ deglutition as per nursing, received phone call. Of note, pt found to have a brain mass with brainstem compression, suspicious of a meningioma. Now s/p R retrosig crani in lateral position for resection of meningioma. Extubated 9/8. Observed pt at bedside, OOB in dominick-chair. RA. NAD. Communicating well. Able to follow directives and conversation. +R sided weakness/ asymmetry. Pt reporting intolerance for medication w/ need for RN to crush it (same report given by the RN) as well as continued difficulty w/ other textures. Pt and spouse concerned as plan for d/c tomorrow, 9/12, pending. Offered pt trials of thin liquids via straw single small sips, mildly thick liquids via tsp x3 trials and purees x2 trials as well as thin liquids via tsp/brothx1, and soft solids/ noodles in brothx1.  Oral phase marked by adequate orientation to feeding tsp, able to strip the bolus from the tsp and generate negative pressure via straw to extract bolus, with prolonged mastication of more advanced textures however overall functional. No anterior leakage despite R sided weakness. Noted pt placing po on stronger side throughout, L side. Pharyngeal phase marked by suspected inconsistent mild latency to timely onset of swallow function w/ trials, 2-3 swallows per single small trial w/ all textures more often w/ thin liquids and more advanced solids, laborious/ effortful deglutition more prominent w/ more advanced textures/solids w/ concern for pharyngeal residue vs airway invasion / penetration subsequently leading to OVERT S/SX OF ASPIRATION w/ thin liquids c/b change in vocal quality/minor however apparent and latent cough response. No s/sx of aspiration w/ purees or w/ mildly thick liquids via tsp. Given current swallow profile, adherence to swallow safety/aspiration precautions w/ continued difficulty, and concern from pt and spouse regarding pending d/c to home plan for instrumental intervention is strongly recommended. All questions answered by spouse and patient regarding recommendations and objective testing.     Given current swallow profile following discussion w/ pt and her spouse current recommendations are for conservative textures of puree solids and mildly thick liquids via tsp (to control rate and amount), in the interim, until objective testing can be completed. Would encourage medications to be crushed if applicable or changed to IV if warranted.       RECOMMENDATIONS:   1. Puree and mildly thick liquids via tsp only in the interim   2. No cup or straw presentations   3. Single small sips   4. Aspiration precautions   5. Medication crushed if applicable or transitioned to IV if warranted   6. MBS, will require ORDER  7. 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 Pt. 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 Pt. had COVID-19 infection on 2/2022 with symptoms of cough and sore throat - recovered at home. Pt is s/p RIGHT retrosig crani in lateral position for resection of meningioma.     -Neurological: Awake, alert ox3, bilat 6th, 7th palsy on the right, PERRL 3 mm bilat, follows commands in all 4 ext  face w R facial,  tongue pointing to L, no drift, fully AG x4;Per Neurosurgery, CN 5-12, 9 cut on right side.  -ETT 9/7-9/8       TODAY, pt p/w continued difficulty w/ deglutition as per nursing, received phone call. Of note, pt found to have a brain mass with brainstem compression, suspicious of a meningioma. Now s/p R retrosig crani in lateral position for resection of meningioma. Extubated 9/8. Observed pt at bedside, OOB in dominick-chair. RA. NAD. Communicating well. Able to follow directives and conversation. +R sided weakness/ asymmetry. Pt reporting intolerance for medication w/ need for RN to crush it (same report given by the RN) as well as continued difficulty w/ other textures. Pt and spouse concerned as plan for d/c tomorrow, 9/12, pending. Offered pt trials of thin liquids via straw single small sips, mildly thick liquids via tsp x3 trials and purees x2 trials as well as thin liquids via tsp/brothx1, and soft solids/ noodles in brothx1.  Oral phase marked by adequate orientation to feeding tsp, able to strip the bolus from the tsp and generate negative pressure via straw to extract bolus, with prolonged mastication of more advanced textures however overall functional. No anterior leakage despite R sided weakness. Noted pt placing po on stronger side throughout, L side. Pharyngeal phase marked by suspected inconsistent mild latency to timely onset of swallow function w/ trials, 2-3 swallows per single small trial w/ all textures more often w/ thin liquids and more advanced solids, laborious/ effortful deglutition more prominent w/ more advanced textures/solids w/ concern for pharyngeal residue vs airway invasion / penetration subsequently leading to OVERT S/SX OF ASPIRATION w/ thin liquids c/b change in vocal quality/minor however apparent and latent cough response. No s/sx of aspiration w/ purees or w/ mildly thick liquids via tsp. Given current swallow profile, adherence to swallow safety/aspiration precautions w/ continued difficulty, and concern from pt and spouse regarding pending d/c to home plan for instrumental intervention is strongly recommended. All questions answered by spouse and patient regarding recommendations and objective testing.     Given current swallow profile following discussion w/ pt and her spouse current recommendations are for conservative textures of puree solids and mildly thick liquids via tsp (to control rate and amount), in the interim, until objective testing can be completed. Would encourage medications to be crushed if applicable or changed to IV if warranted.       RECOMMENDATIONS:   1. Puree and mildly thick liquids via tsp only in the interim   2. No cup or straw presentations   3. Single small sips   4. Aspiration precautions   5. Medication crushed if applicable or transitioned to IV if warranted   6. MBS, will require ORDER  7. Sw tx during course, anticipate needs at next level of care     Janis Arevalo MS CCC-SLP [employer]   extension [phone]   Spectra [phone]    D/w ÁNGELA Harper  Call placed to NEURO BILL; awaiting return call

## 2022-09-12 ENCOUNTER — APPOINTMENT (OUTPATIENT)
Dept: OPHTHALMOLOGY | Facility: CLINIC | Age: 37
End: 2022-09-12

## 2022-09-12 ENCOUNTER — TRANSCRIPTION ENCOUNTER (OUTPATIENT)
Age: 37
End: 2022-09-12

## 2022-09-12 DIAGNOSIS — J38.01 PARALYSIS OF VOCAL CORDS AND LARYNX, UNILATERAL: ICD-10-CM

## 2022-09-12 PROCEDURE — 99222 1ST HOSP IP/OBS MODERATE 55: CPT | Mod: 25

## 2022-09-12 PROCEDURE — 74230 X-RAY XM SWLNG FUNCJ C+: CPT | Mod: 26

## 2022-09-12 PROCEDURE — 31575 DIAGNOSTIC LARYNGOSCOPY: CPT

## 2022-09-12 RX ORDER — PANTOPRAZOLE SODIUM 20 MG/1
40 TABLET, DELAYED RELEASE ORAL
Refills: 0 | Status: DISCONTINUED | OUTPATIENT
Start: 2022-09-12 | End: 2022-09-12

## 2022-09-12 RX ORDER — SODIUM CHLORIDE 0.65 %
1 AEROSOL, SPRAY (ML) NASAL
Refills: 0 | Status: DISCONTINUED | OUTPATIENT
Start: 2022-09-12 | End: 2022-09-13

## 2022-09-12 RX ORDER — PANTOPRAZOLE SODIUM 20 MG/1
40 TABLET, DELAYED RELEASE ORAL DAILY
Refills: 0 | Status: DISCONTINUED | OUTPATIENT
Start: 2022-09-12 | End: 2022-09-13

## 2022-09-12 RX ADMIN — Medication 4 MILLIGRAM(S): at 06:14

## 2022-09-12 RX ADMIN — Medication 1 APPLICATION(S): at 17:15

## 2022-09-12 RX ADMIN — SENNA PLUS 2 TABLET(S): 8.6 TABLET ORAL at 21:07

## 2022-09-12 RX ADMIN — ENOXAPARIN SODIUM 40 MILLIGRAM(S): 100 INJECTION SUBCUTANEOUS at 17:15

## 2022-09-12 RX ADMIN — Medication 4 MILLIGRAM(S): at 17:15

## 2022-09-12 RX ADMIN — Medication 4 MILLIGRAM(S): at 11:38

## 2022-09-12 RX ADMIN — POLYETHYLENE GLYCOL 3350 17 GRAM(S): 17 POWDER, FOR SOLUTION ORAL at 06:14

## 2022-09-12 RX ADMIN — PANTOPRAZOLE SODIUM 40 MILLIGRAM(S): 20 TABLET, DELAYED RELEASE ORAL at 17:16

## 2022-09-12 RX ADMIN — Medication 4 MILLIGRAM(S): at 23:25

## 2022-09-12 RX ADMIN — Medication 1 TABLET(S): at 11:38

## 2022-09-12 NOTE — CONSULT NOTE ADULT - ASSESSMENT
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 Bedside indirect laryngoscopy revealed a right vocal cord paralysis in the paramedian position, pooling of secretions, airway widely patent, left vocal cord mobile and intact with good excursion. I would defer a vocal cord injection for now since pt is toleration a modified diet. Rec. F/U with Dr Bowens as outpt. 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 Bedside indirect laryngoscopy revealed a right vocal cord paralysis in the paramedian position, pooling of secretions, airway widely patent, left vocal cord mobile and intact with good excursion. I would defer a vocal cord injection for now since pt is toleration a modified diet. Pt also with right facial nerve weakness and right hearing loss. Rec. F/U with Dr Bowens as outpt. 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 Bedside indirect laryngoscopy revealed a right vocal cord paralysis in the paramedian position, pooling of secretions, airway widely patent, left vocal cord mobile and intact with good excursion. I would defer a vocal cord injection for now since pt is toleration a diet, and voice is pretty strong. also neuro feels will come back soon and would to hold. Pt also with right facial nerve weakness and right hearing loss. Rec. F/U with Dr Delacruz

## 2022-09-12 NOTE — SWALLOW VFSS/MBS ASSESSMENT ADULT - ROSENBEK'S PENETRATION ASPIRATION SCALE
sub and supraglottic residue cleared with strategies as noted below/(6) contrast passes glottis, no subglottic residue remains (aspiration)

## 2022-09-12 NOTE — DISCHARGE NOTE NURSING/CASE MANAGEMENT/SOCIAL WORK - PATIENT PORTAL LINK FT
You can access the FollowMyHealth Patient Portal offered by [facility] by registering at the following website: [email or web address]. By joining [facility]’s FollowMyHealth portal, you will also be able to view your health information using other applications (apps) compatible with our system.

## 2022-09-12 NOTE — PROGRESS NOTE ADULT - SUBJECTIVE AND OBJECTIVE BOX
SUBJECTIVE:  Patient seen and examined with  at bedside. Awaiting an MBS this morning    OBJECTIVE: No acute events.    Vital Signs Last 24 Hrs  T(C): 37.1 (12 Sep 2022 05:19), Max: 37.1 (12 Sep 2022 05:19)  T(F): 98.8 (12 Sep 2022 05:19), Max: 98.8 (12 Sep 2022 05:19)  HR: 60 (12 Sep 2022 05:19) (58 - 84)  BP: 109/72 (12 Sep 2022 05:19) (109/72 - 134/92)  BP(mean): --  RR: 18 (12 Sep 2022 05:19) (18 - 18)  SpO2: 95% (12 Sep 2022 05:19) (94% - 99%)    Parameters below as of 12 Sep 2022 05:19  Patient On (Oxygen Delivery Method): room air      PHYSICAL EXAM:    Constitutional: No Acute Distress     Neurological: Awake, alert ox3, RT CN6 palsy, RT CN 4 palsy, RT CN7 palsy, RT CN9 palsy,  PERRL 3 mm bilat, FC, HAYWARD 5/5.     Pulmonary: Clear to Auscultation, No rales, No rhonchi, No wheezes   Cardiovascular: S1, S2, Regular rate and rhythm   Gastrointestinal: Soft, Non-tender, Non-distended   Extremities: No calf tenderness     Incision: stitches intact, no ssx of csf leak/collection.     LABS: No new labs, 9/10 labs stable     MEDICATIONS  (STANDING):  dexAMETHasone     Tablet 4 milliGRAM(s) Oral every 6 hours  enoxaparin Injectable 40 milliGRAM(s) SubCutaneous <User Schedule>  polyethylene glycol 3350 17 Gram(s) Oral every 12 hours  prenatal multivitamin 1 Tablet(s) Oral daily  senna 2 Tablet(s) Oral at bedtime    MEDICATIONS  (PRN):  acetaminophen     Tablet .. 650 milliGRAM(s) Oral every 6 hours PRN Temp greater or equal to 38C (100.4F), Mild Pain (1 - 3)  artificial  tears Solution 1 Drop(s) Both EYES every 4 hours PRN Dry Eyes  oxyCODONE    IR 5 milliGRAM(s) Oral every 6 hours PRN discomfort  oxyCODONE    IR 10 milliGRAM(s) Oral every 6 hours PRN Severe Pain (7 - 10)    IMAGING:     < from: MR Head w/wo IV Cont (09.09.22 @ 16:18) >  IMPRESSION: Post resection of right kareem clinoid meningioma with a small   amount of residual in the prepontine cistern and in the right internal   auditory canal. Resolution of mass effect on the right portion of the   vilma and medulla compared with 9/6/2022.    --- End of Report --- SUBJECTIVE:  Patient seen and examined with  at bedside. Awaiting an MBS this morning    OBJECTIVE: No acute events.    Vital Signs Last 24 Hrs  T(C): 37.1 (12 Sep 2022 05:19), Max: 37.1 (12 Sep 2022 05:19)  T(F): 98.8 (12 Sep 2022 05:19), Max: 98.8 (12 Sep 2022 05:19)  HR: 60 (12 Sep 2022 05:19) (58 - 84)  BP: 109/72 (12 Sep 2022 05:19) (109/72 - 134/92)  BP(mean): --  RR: 18 (12 Sep 2022 05:19) (18 - 18)  SpO2: 95% (12 Sep 2022 05:19) (94% - 99%)    Parameters below as of 12 Sep 2022 05:19  Patient On (Oxygen Delivery Method): room air      PHYSICAL EXAM:    Constitutional: No Acute Distress     Neurological: Awake, alert ox3, RT CN6 palsy, RT CN7 palsy slowly improving ,  PERRL 3 mm bilat, FC, HAYWARD 5/5.     Pulmonary: Clear to Auscultation, No rales, No rhonchi, No wheezes   Cardiovascular: S1, S2, Regular rate and rhythm   Gastrointestinal: Soft, Non-tender, Non-distended   Extremities: No calf tenderness     Incision: stitches intact, no ssx of csf leak/collection.     LABS: No new labs, 9/10 labs stable     MEDICATIONS  (STANDING):  dexAMETHasone     Tablet 4 milliGRAM(s) Oral every 6 hours  enoxaparin Injectable 40 milliGRAM(s) SubCutaneous <User Schedule>  polyethylene glycol 3350 17 Gram(s) Oral every 12 hours  prenatal multivitamin 1 Tablet(s) Oral daily  senna 2 Tablet(s) Oral at bedtime    MEDICATIONS  (PRN):  acetaminophen     Tablet .. 650 milliGRAM(s) Oral every 6 hours PRN Temp greater or equal to 38C (100.4F), Mild Pain (1 - 3)  artificial  tears Solution 1 Drop(s) Both EYES every 4 hours PRN Dry Eyes  oxyCODONE    IR 5 milliGRAM(s) Oral every 6 hours PRN discomfort  oxyCODONE    IR 10 milliGRAM(s) Oral every 6 hours PRN Severe Pain (7 - 10)    IMAGING:     < from: MR Head w/wo IV Cont (09.09.22 @ 16:18) >  IMPRESSION: Post resection of right kareem clinoid meningioma with a small   amount of residual in the prepontine cistern and in the right internal   auditory canal. Resolution of mass effect on the right portion of the   vilma and medulla compared with 9/6/2022.    --- End of Report --- SUBJECTIVE:  Patient seen and examined with  at bedside. Awaiting an MBS this morning    OBJECTIVE: No acute events.    Vital Signs Last 24 Hrs  T(C): 37.1 (12 Sep 2022 05:19), Max: 37.1 (12 Sep 2022 05:19)  T(F): 98.8 (12 Sep 2022 05:19), Max: 98.8 (12 Sep 2022 05:19)  HR: 60 (12 Sep 2022 05:19) (58 - 84)  BP: 109/72 (12 Sep 2022 05:19) (109/72 - 134/92)  BP(mean): --  RR: 18 (12 Sep 2022 05:19) (18 - 18)  SpO2: 95% (12 Sep 2022 05:19) (94% - 99%)    Parameters below as of 12 Sep 2022 05:19  Patient On (Oxygen Delivery Method): room air      PHYSICAL EXAM:    Constitutional: No Acute Distress     Neurological: Awake, alert ox3, RT CN6 palsy, RT CN7 palsy, RT CN9 Palsy ,  PERRL 3 mm bilat, FC, HAYWARD 5/5.     Pulmonary: Clear to Auscultation, No rales, No rhonchi, No wheezes   Cardiovascular: S1, S2, Regular rate and rhythm   Gastrointestinal: Soft, Non-tender, Non-distended   Extremities: No calf tenderness     Incision: stitches intact, no ssx of csf leak/collection.     LABS: No new labs, 9/10 labs stable     MEDICATIONS  (STANDING):  dexAMETHasone     Tablet 4 milliGRAM(s) Oral every 6 hours  enoxaparin Injectable 40 milliGRAM(s) SubCutaneous <User Schedule>  polyethylene glycol 3350 17 Gram(s) Oral every 12 hours  prenatal multivitamin 1 Tablet(s) Oral daily  senna 2 Tablet(s) Oral at bedtime    MEDICATIONS  (PRN):  acetaminophen     Tablet .. 650 milliGRAM(s) Oral every 6 hours PRN Temp greater or equal to 38C (100.4F), Mild Pain (1 - 3)  artificial  tears Solution 1 Drop(s) Both EYES every 4 hours PRN Dry Eyes  oxyCODONE    IR 5 milliGRAM(s) Oral every 6 hours PRN discomfort  oxyCODONE    IR 10 milliGRAM(s) Oral every 6 hours PRN Severe Pain (7 - 10)    IMAGING:     < from: MR Head w/wo IV Cont (09.09.22 @ 16:18) >  IMPRESSION: Post resection of right kareem clinoid meningioma with a small   amount of residual in the prepontine cistern and in the right internal   auditory canal. Resolution of mass effect on the right portion of the   vilma and medulla compared with 9/6/2022.    --- End of Report ---

## 2022-09-12 NOTE — SWALLOW VFSS/MBS ASSESSMENT ADULT - NS SWALLOW VFSS REC ASPIR MON
Monitor for s/s aspiration/laryngeal penetration. If noted:  D/C p.o. intake, provide non-oral nutrition/hydration/meds, and contact this service @ [phone]/change of breathing pattern/cough/gurgly voice/fever/pneumonia/throat clearing/upper respiratory infection

## 2022-09-12 NOTE — LACTATION INITIAL EVALUATION - LACTATION INTERVENTIONS
Pt has been pumping and 'dumping" her breast-milk post surgery due to concerns of current meds. had been primarily breastfeeding except for an occasional bottle of formula used if she was out at a doctors appointment. Wanted to discuss how to maintain her supply, safety with current and future meds and needs a custom flange. Discussed pumping guidelines to maintain he supply and gave custom 21mm flanges. Mother currently is using hospital grade pump and has supplies to continue to pump. Reviewed current medications and at this time it is recommended that she continue to pump and discard her milk due to high does of steroids. Pt/family/staff give LC contact info to  f/u when medications change. Discussed handling of frozen breastmilk at home to prepare and feed her infant with family and gave written storage guidelines as well./initiate/review pumping guidelines and safe milk handling
provided patient with both LactMed data and Noemy's recommendations from Medications & Mother's Milk text. Discussed that there is no explicit contraindication to feeding her milk while taking Decadron, however, some caution is warranted as the evidence indicates to avoid feeding milk during prolonged use of high doses. Discussed harm reduction approach to providing some milk rather than an exclusive diet, expressing milk/breastfeeding at least 3 hours after a dose, waiting until intended taper has begun so lower dose regimen underway. Additionally, encouraged to contact specialists at [facility] directly for their recommendation.

## 2022-09-12 NOTE — SWALLOW VFSS/MBS ASSESSMENT ADULT - ADDITIONAL RECOMMENDATIONS
Swallow Strategies IF oral diet is initiated:  1) purees and thin liquids via teaspoon only  2) Right head turn for purees with 3-4 Repeat Swallows to reduce pharyngeal residue  3) Chin tuck for thin liquids by teaspoon; 3-4 repeat swallows  4) intermittent volitional Cough and re-swallow to clear laryngeal penetration  5) NO cups, NO straws

## 2022-09-12 NOTE — SWALLOW VFSS/MBS ASSESSMENT ADULT - DEMONSTRATES NEED FOR REFERRAL TO ANOTHER SERVICE
for dysphagia, dysphonia post op; +odynophagia; Please eval/treat for lingual and likely pharyngeal candida./ENT

## 2022-09-12 NOTE — CHART NOTE - NSCHARTNOTEFT_GEN_A_CORE
RD received verbal consult from SLP Kami to speak with pt and her  about nutrition to ensure pt receives adequate nutrition and meet estimated protein energy needs in setting of recent craniotomy and pt breastfeeding. Pt currently on Puree diet with Thin Liquids.    Reviewed high kcal/high protein foods and made recommendations for planning meals, having small frequent meals, protein at every meal, oral nutrition supplementation, staying hydrated, choosing full fat dairy products, use of fats/oils in cooking vegetables as opposed to steaming/boiling, and choosing whole grains/limiting concentrated sweets while on decadron to promote blood glucose readings WNL. Emphasized monitoring weight to help in assessing if pt is meeting estimated needs. Encouraged pt to seek outpatient RD services if interested for ongoing nutrition education/counseling/support. Pt and  made aware RD remains available for any questions/concerns.    Adilia Davila, CORRY, CDN Pager #[phone]

## 2022-09-12 NOTE — SWALLOW VFSS/MBS ASSESSMENT ADULT - SUCCESSFUL STRATEGIES TRIALED DURING PROCEDURE
3-4 repeat swallows minimizes pharyngeal residue; chin tuck for thin liquids by teaspoon; volitional cough +repeat swallow to clear pen/asp/chin tuck/hard swallow/head turn to the right/productive volitional cough following clinician cue

## 2022-09-12 NOTE — SWALLOW VFSS/MBS ASSESSMENT ADULT - COMMENTS
9/7: R retrosig crani in lateral position for resection of meningioma. Extubated 9/8.  Per Neurosurgery, CN 5-12, 9 cut on right side. Pseudomeningocele watch. Left side motors dec. intra-op but returned.  9/9: Initial bedside swallow evaluation completed with overt signs of oropharyngeal dysphagia. Delayed oral transfer; Lingual weakness; Multiple swallows; throat clearing across consistencies. Regular diet maintained.   9/11: Seen for dysphagia follow up. Pt c/o increased swallowing difficulty. Re-assessment found oral and pharyngeal deficits; overt signs suggestive of oral and pharyngeal stasis as well as penetration/aspiration. purees and mildly thick liquids via teaspoon and MBS recommended.

## 2022-09-12 NOTE — CONSULT NOTE ADULT - NS ATTEND AMEND GEN_ALL_CORE FT
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

## 2022-09-12 NOTE — CONSULT NOTE ADULT - PROBLEM SELECTOR RECOMMENDATION 9
F/U with Dr Bowens as outpt  PPI  Diet per speech  Eye drops   Eye patch F/U with Dr Bowens as outpt  PPI  Diet per speech  Eye drops/ lacrilube   showed how to tape eye at night

## 2022-09-12 NOTE — LACTATION INITIAL EVALUATION - NS LACT CON REASON FOR REQ
lactating mother of a 3 month old admitted for craniotomy./general questions without assessment/staff request
questions regarding medications and mother's milk/general questions without assessment/staff request/patient request

## 2022-09-12 NOTE — LACTATION INITIAL EVALUATION - INTERVENTION OUTCOME
patient has LC contact info for additional questions or concerns/verbalizes understanding/needs met
verbalizes understanding/demonstrates understanding of teaching/needs met/Lactation team to follow up

## 2022-09-12 NOTE — CONSULT NOTE ADULT - SUBJECTIVE AND OBJECTIVE BOX
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 Pt was seen by Dr gandara as outpt preOp for evaluation of lower cranial nerves and right hearing loss. All of the lower cranial nerves were intact other than 6th and 8th. 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 ENT was called for hypophonia. Pt states it started after extubation. Pt was seen by S/S and placed on a modified diet. Pt admits to double vision, but denies sore throat, SOB, cough.        PAST MEDICAL & SURGICAL HISTORY:  History of cerebral meningioma      Costochondral chest pain  2020 - resolved      H/O foot surgery  &#x27;s, right 2nd digit      S/P   22        Allergies    No Known Allergies    Intolerances      MEDICATIONS  (STANDING):  dexAMETHasone     Tablet 4 milliGRAM(s) Oral every 6 hours  enoxaparin Injectable 40 milliGRAM(s) SubCutaneous <User Schedule>  pantoprazole    Tablet 40 milliGRAM(s) Oral before breakfast  polyethylene glycol 3350 17 Gram(s) Oral every 12 hours  prenatal multivitamin 1 Tablet(s) Oral daily  senna 2 Tablet(s) Oral at bedtime  sodium chloride 0.65% Nasal 1 Spray(s) Both Nostrils four times a day    MEDICATIONS  (PRN):  acetaminophen     Tablet .. 650 milliGRAM(s) Oral every 6 hours PRN Temp greater or equal to 38C (100.4F), Mild Pain (1 - 3)  artificial  tears Solution 1 Drop(s) Both EYES every 4 hours PRN Dry Eyes  oxyCODONE    IR 5 milliGRAM(s) Oral every 6 hours PRN discomfort  oxyCODONE    IR 10 milliGRAM(s) Oral every 6 hours PRN Severe Pain (7 - 10)  petrolatum Ophthalmic Ointment 1 Application(s) Right EYE three times a day PRN lubricant      Social History: no tobacco use    Family history: no pertinent FHx    ROS:   ENT: all negative except as noted in HPI   CV: denies palpitations  Pulm: denies SOB, cough, hemoptysis  GI: denies change in apetite, indigestion, n/v  : denies pertinent urinary symptoms, urgency  Neuro: denies numbness/tingling, loss of sensation  Psych: denies anxiety  MS: denies muscle weakness, instability  Heme: denies easy bruising or bleeding  Endo: denies heat/cold intolerance, excessive sweating  Vascular: denies LE edema    Vital Signs Last 24 Hrs  T(C): 37.1 (12 Sep 2022 05:19), Max: 37.1 (12 Sep 2022 05:19)  T(F): 98.8 (12 Sep 2022 05:19), Max: 98.8 (12 Sep 2022 05:19)  HR: 60 (12 Sep 2022 05:19) (58 - 84)  BP: 109/72 (12 Sep 2022 05:19) (109/72 - 134/92)  BP(mean): --  RR: 18 (12 Sep 2022 05:19) (18 - 18)  SpO2: 95% (12 Sep 2022 05:19) (94% - 99%)    Parameters below as of 12 Sep 2022 05:19  Patient On (Oxygen Delivery Method): room air                  PHYSICAL EXAM:  Gen: NAD  Skin: No rashes, bruises, or lesions  Head: Normocephalic, Atraumatic  Face: no edema, erythema, or fluctuance. Parotid glands soft without mass  Eyes: Unable to close right eye, EOMI  Ears: Right - ear canal clear, TM intact without effusion or erythema. No evidence of any fluid drainage. No mastoid tenderness, erythema, or ear bulging            Left - ear canal clear, TM intact without effusion or erythema. No evidence of any fluid drainage. No mastoid tenderness, erythema, or ear bulging  Nose: Nares bilaterally patent, no discharge  Mouth: Soft palate rises symmetrically, No Stridor / Drooling / Trismus.  Mucosa moist, tongue/uvula midline, oropharynx clear  Neck: Flat, supple, no lymphadenopathy, trachea midline, no masses  Lymphatic: No lymphadenopathy  Resp: breathing easily, no stridor  CV: no peripheral edema/cyanosis  GI: nondistended   Peripheral vascular: no JVD or edema  Neuro: Right facial nerve weakness      Fiberoptic Indirect laryngoscopy:  (Scope #2 used)    Reason for Laryngoscopy:    Patient was unable to cooperate with mirror.  Right vocal cord paralysis in the paramedian position, left vocal cord mobile and intact with good excursion, pooling of secretions in the post-cricoid region, vallecula, and right piriform sinus. Nasopharynx, oropharynx, and hypopharynx clear, no bleeding. Tongue base, posterior pharyngeal wall, vallecula, epiglottis, and subglottis appear normal. No erythema, edema, masses or lesions. Airway patent, no foreign body visualized. No glottic/supraglottic edema.  arytenoids, vestibular folds, ventricles, pyriform sinuses, and aryepiglottic folds appear normal bilaterally.        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 Pt was seen by Dr gandara as outpt preOp for evaluation of lower cranial nerves and right hearing loss. All of the lower cranial nerves were intact other than 6th and 8th. 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 ENT was called for hypophonia. Pt states it started after extubation. Pt was seen by S/S and placed on a modified diet. Pt admits to double vision, but denies sore throat, SOB, cough.        PAST MEDICAL & SURGICAL HISTORY:  History of cerebral meningioma      Costochondral chest pain  2020 - resolved      H/O foot surgery  &#x27;s, right 2nd digit      S/P   22        Allergies    No Known Allergies    Intolerances      MEDICATIONS  (STANDING):  dexAMETHasone     Tablet 4 milliGRAM(s) Oral every 6 hours  enoxaparin Injectable 40 milliGRAM(s) SubCutaneous <User Schedule>  pantoprazole    Tablet 40 milliGRAM(s) Oral before breakfast  polyethylene glycol 3350 17 Gram(s) Oral every 12 hours  prenatal multivitamin 1 Tablet(s) Oral daily  senna 2 Tablet(s) Oral at bedtime  sodium chloride 0.65% Nasal 1 Spray(s) Both Nostrils four times a day    MEDICATIONS  (PRN):  acetaminophen     Tablet .. 650 milliGRAM(s) Oral every 6 hours PRN Temp greater or equal to 38C (100.4F), Mild Pain (1 - 3)  artificial  tears Solution 1 Drop(s) Both EYES every 4 hours PRN Dry Eyes  oxyCODONE    IR 5 milliGRAM(s) Oral every 6 hours PRN discomfort  oxyCODONE    IR 10 milliGRAM(s) Oral every 6 hours PRN Severe Pain (7 - 10)  petrolatum Ophthalmic Ointment 1 Application(s) Right EYE three times a day PRN lubricant      Social History: no tobacco use    Family history: no pertinent FHx    ROS:   ENT: all negative except as noted in HPI   CV: denies palpitations  Pulm: denies SOB, cough, hemoptysis  GI: denies change in apetite, indigestion, n/v  : denies pertinent urinary symptoms, urgency  Neuro: denies numbness/tingling, loss of sensation  Psych: denies anxiety  MS: denies muscle weakness, instability  Heme: denies easy bruising or bleeding  Endo: denies heat/cold intolerance, excessive sweating  Vascular: denies LE edema    Vital Signs Last 24 Hrs  T(C): 37.1 (12 Sep 2022 05:19), Max: 37.1 (12 Sep 2022 05:19)  T(F): 98.8 (12 Sep 2022 05:19), Max: 98.8 (12 Sep 2022 05:19)  HR: 60 (12 Sep 2022 05:19) (58 - 84)  BP: 109/72 (12 Sep 2022 05:19) (109/72 - 134/92)  BP(mean): --  RR: 18 (12 Sep 2022 05:19) (18 - 18)  SpO2: 95% (12 Sep 2022 05:19) (94% - 99%)    Parameters below as of 12 Sep 2022 05:19  Patient On (Oxygen Delivery Method): room air                  PHYSICAL EXAM:  Gen: NAD  Skin: No rashes, bruises, or lesions  Head: Normocephalic, Atraumatic  Face: R. facial nerve weakness, no edema, erythema, or fluctuance. Parotid glands soft without mass  Eyes: Unable to close right eye, EOMI  Ears: Right - ear canal clear, TM intact without effusion or erythema. No evidence of any fluid drainage. No mastoid tenderness, erythema, or ear bulging            Left - ear canal clear, TM intact without effusion or erythema. No evidence of any fluid drainage. No mastoid tenderness, erythema, or ear bulging  Nose: Nares bilaterally patent, no discharge  Mouth: Soft palate with decreased mobility, +asymmetry, No Stridor / Drooling / Trismus.  Mucosa moist, tongue/uvula midline, oropharynx clear  Neck: Flat, supple, no lymphadenopathy, trachea midline, no masses  Lymphatic: No lymphadenopathy  Resp: breathing easily, no stridor  CV: no peripheral edema/cyanosis  GI: nondistended   Peripheral vascular: no JVD or edema  Neuro: Right facial nerve weakness      Fiberoptic Indirect laryngoscopy:  (Scope #2 used)    Reason for Laryngoscopy:    Patient was unable to cooperate with mirror.  Right vocal cord paralysis in the paramedian position, left vocal cord mobile and intact with good excursion, pooling of secretions in the post-cricoid region, vallecula, and right piriform sinus. Nasopharynx, oropharynx, and hypopharynx clear, no bleeding. Tongue base, posterior pharyngeal wall, vallecula, epiglottis, and subglottis appear normal. No erythema, edema, masses or lesions. Airway patent, no foreign body visualized. No glottic/supraglottic edema.  arytenoids, vestibular folds, ventricles, pyriform sinuses, and aryepiglottic folds appear normal bilaterally.

## 2022-09-12 NOTE — SWALLOW VFSS/MBS ASSESSMENT ADULT - RECOMMENDED CONSISTENCY
If in keeping with patient wishes, MD may consider NPO, with non-oral nutrition/hydration/medications. However if NPO is not possible, purees and thin liquids via teaspoon with use of swallow strategies (as noted in addl recommendations) may help to minimize however not fully eliminate risk for aspiration. If oral diet is to be initiated, calorie count is recommended to determine adequacy of nutrition/hydration. Patient should be monitored closely for signs/symptoms of aspiration. Management of reflux is recommended. Please see additional recommendations below.

## 2022-09-12 NOTE — SWALLOW VFSS/MBS ASSESSMENT ADULT - DIAGNOSTIC IMPRESSIONS
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 Patient presents on MBS with 1) an oropharyngeal and pharyngo-esophageal dysphagia. There is delayed/reduced oral transfer of conservative textures due to right sided lingual weakness. In the pharyngeal stage, there is reduced base of tongue retraction and reduced hyolaryngeal elevation which results in moderate pharyngeal retention. Pharyngeal residue is minimized however not fully cleared on repeat swallows (x3-4). There is reduced airway closure which results in trace-min silent laryngeal penetration and subsequent trace silent aspiration of residual material. There is evidence of esophageal retention with retrograde flow above the UES which contributes to additional pharyngeal residue and penetration/aspiration after primary swallow. A right head turn in conjunction with repeat swallows (x3-4) optimizes pharyngeal clearance for purees. Chin tuck position and repeat swallows followed by cued cough improves airway protection for thin liquids via teaspoon. Thickened liquids did not provide significant benefit for pharyngeal clearance or airway protection. Solids were not trialed due to poor pharyngeal clearance. There is concern for whether patient will be able to maintain sufficient oral intake given overall inefficiency of swallow with extensive swallow strategies needed to support airway protection. Patient is AA+Ox4 and able to employ strategies effectively under controlled conditions and with the benefit of fluoro to implement in timely and effective manner. In the home setting, patient is judged to be at moderate to high risk for aspiration and moderate to high risk for malnutrition/dehydration. 2)odynophagia with c/o "burning" in throat upon swallowing. Lingual coating is noted c/f thrush which may extend to pharyngeal/laryngeal mucosa. 3)dysphonia, vocal quality is hoarse, breathy, hypophonic.

## 2022-09-12 NOTE — PROGRESS NOTE ADULT - ASSESSMENT
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 Pt. 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 Pt. had COVID-19 infection on 2/2022 with symptoms of cough and sore throat - recovered at home. Pt. denies recent fever, chills, chest pain, SOB, changes in bowel/urinary habits. Pt. scheduled to see Dr. Bowens pre-op for baseline audiology and cranial nerve function-has appointment on 9/2/22.    NEURO:  MR reviewed and discussed with patient/.  Cont decadron, will have taper plan upon DC.  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  Can cover eye to help with diploplia.  Defer gold weight eval for now per patient    PULM:  Incentive spirometry as tolerated  RA sats >92%    CV:  Keep -150mmHg    RENAL:   IVL    GI:   pureed diet as of yesterday, mild concerns with liquids.  Awaiting MBS and reccs for DC  cont bowel regimen    ENDO:   Euglycemia (-180)    HEME/ONC:   LE dopp negative  SCDs, SQL    Dispo: Home with outpatient PT/OT/Vision, working on getting home PT/OT.  OT to see before potential DC.     Discussed with patient and family wound care, follow up plans, activity, and medications. Questions answered, and they verbalized understanding.    d/w Dr. Lau  [id number]    spoke to pharmacy, patient able to pump and give baby milk with even while on decadron.  No need to pump and dump.,  Fridge ordered for room while patient is here.   History of Present Illness  Care Coordination Encounter Information:   Type of Encounter: Telephonic    Spoke to Adult Child and Other   Yeni Palomino  Care Coordination SL Nurse [facility]:   The reason for call is to discuss coordination of meeting care plan treatment goals  : Patient's  continues to be her primary care giver  They have both had falls recently and Yeni Palomino have been trying to help them that community resources/assisted living/home care could be of benefit  At this time, the patient defers to her , who does not wish to make any changes  Past Medical History    1  History of Acute UTI (599 0) (N39 0)   2  History of Anticoagulant long-term use (V58 61) (Z79 01)   3  History of Cellulitis (682 9) (L03 90)   4  History of Dysuria (788 1) (R30 0)   5  History of acute bronchitis (V12 69) (Z87 09)   6  History of blurred vision (V12 49) (Z86 69)   7  History of urinary tract infection (V13 02) (Z87 440)   8  History of Need for prophylactic vaccination and inoculation against influenza (V04 81)   (Z23)   9  History of Noninfected skin tear of left leg (891 0) (S81 802A)   10  Old myocardial infarction (412) (I25 2)   11  History of Rectal carcinoma (154 1) (C20)   12  History of Visit for screening mammogram (V76 12) (Z12 31)   13  History of Wound of left leg (891 0) (L81 299V)    Surgical History    1  History of Previous Balloon Angioplasty   2  History of Rectal Surgery    Family History  Mother    1  Family history of    2  Family history of Stroke Syndrome (V17 1)  Father    3  Family history of    4  Family history of Stroke Syndrome (V17 1)  Family History    5  No family history of mental disorder    Social History    · Alcohol Use (History)   · Former smoker (F68 91) (T37 262)   ·    · No drug use   · No secondhand smoke exposure (V49 89) (Z78 9)   · Non-smoker (V49 89) (Z78 9)   · Retired    Current Meds    1   Nitrostat 0 4 MG Sublingual Tablet Sublingual (Nitroglycerin); PLACE 1 TABLET UNDER   THE TONGUE EVERY 5 MINUTES FOR UP TO 3 DOSES AS NEEDED   FOR CHEST PAIN  CALL 911 IF PAIN PERSISTS; Therapy: 65QAS3619 to (Evaluate:13Jan2016)  Requested for: 23NXV8868; Last   Rx:24Nov2015 Ordered    2  DilTIAZem HCl - 120 MG Oral Tablet; ONE TABLET Q D  Requested for: 17Aug2017; Last   Rx:17Aug2017 Ordered    3  Methocarbamol 500 MG Oral Tablet (Robaxin); TAKE 1 TABLET EVERY 8 HOURS AS   NEEDED FOR MUSCLE SPASM; Therapy: 50Lhb7458 to (Evaluate:30Aug2017)  Requested for: 70Mes8594; Last   Rx:[id number] Ordered    4  Celecoxib 200 MG Oral Capsule (CeleBREX); take 1 capsule by mouth daily; Therapy: 26Kbg9802 to (Evaluate:24Nov2017)  Requested for: 80Dis1069; Last   Rx:[id number] Ordered    5  Lactulose 10 GM/15ML Oral Solution; Take 1 tablespoonful daily as needed; Therapy: 17Pty9440 to (Last Wilman Garcia)  Requested for: 64Sgc9612; Status:   ACTIVE - Renewal Denied Ordered    6  Escitalopram Oxalate 5 MG Oral Tablet (Lexapro); take 1 tablet by mouth every day; Therapy: 13TIT8502 to (Evaluate:17Jan2018)  Requested for: 50Xvg4227; Last   Rx:[id number] Ordered   7  LORazepam 0 5 MG Oral Tablet; TAKE 1 TABLET Bedtime; Last Rx:[id number] Ordered    8  Furosemide 40 MG Oral Tablet; Take 1 tablet daily  Requested for: 34Hdz8437; Last   Rx:[id number] Ordered    9  Metoprolol Tartrate 25 MG Oral Tablet; Take 1 tablet twice daily; Therapy: 77EZF1126 to ([place]-[facility] [place])  Requested for: 80Brf1196; Last   Rx:32Xbi3945 Ordered    10  Potassium Chloride ER 10 MEQ Oral Tablet Extended Release; TAKE 1 TABLET DAILY; Therapy: 09PRI7045 to ([place])  Requested for: 03Iro7397; Last    Rx:04Ykd0602 Ordered    11  Ciprofloxacin HCl - 500 MG Oral Tablet; TAKE 1 TABLET TWICE DAILY; Therapy: 73CUV3397 to (05 12 73 93 30)  Requested for: 64PNN2069; Last    Rx:17Oct2017 Ordered    12  Aspirin 81 MG TABS; TAKE 1 TABLET DAILY;     Therapy: (Recorded:17Aug2017) to Recorded    Allergies    1  Heparin    End of Encounter Meds    1  Nitrostat 0 4 MG Sublingual Tablet Sublingual (Nitroglycerin); PLACE 1 TABLET UNDER   THE TONGUE EVERY 5 MINUTES FOR UP TO 3 DOSES AS NEEDED   FOR CHEST PAIN  CALL 911 IF PAIN PERSISTS; Therapy: 88PCS8524 to (Evaluate:13Jan2016)  Requested for: 55QWS3780; Last   Rx:24Nov2015 Ordered    2  DilTIAZem HCl - 120 MG Oral Tablet; ONE TABLET Q D  Requested for: 17Aug2017; Last   Rx:17Aug2017 Ordered    3  Methocarbamol 500 MG Oral Tablet (Robaxin); TAKE 1 TABLET EVERY 8 HOURS AS   NEEDED FOR MUSCLE SPASM; Therapy: 24Qdl5063 to (Evaluate:30Aug2017)  Requested for: 81Twl1229; Last   Rx:[id number] Ordered    4  Celecoxib 200 MG Oral Capsule (CeleBREX); take 1 capsule by mouth daily; Therapy: 41Wad8093 to (Evaluate:24Nov2017)  Requested for: 60Ysk9938; Last   Rx:30Hvo8431 Ordered    5  Lactulose 10 GM/15ML Oral Solution; Take 1 tablespoonful daily as needed; Therapy: 04Qkf1940 to (Last Geryl Saver)  Requested for: 60Dhh6188; Status:   ACTIVE - Renewal Denied Ordered    6  Escitalopram Oxalate 5 MG Oral Tablet (Lexapro); take 1 tablet by mouth every day; Therapy: 29KWI3366 to (Evaluate:17Jan2018)  Requested for: 63Bfg3477; Last   Rx:14Arj1302 Ordered   7  LORazepam 0 5 MG Oral Tablet; TAKE 1 TABLET Bedtime; Last Rx:89Ebw1124 Ordered    8  Furosemide 40 MG Oral Tablet; Take 1 tablet daily  Requested for: 56Nar5339; Last   Rx:[id number] Ordered    9  Metoprolol Tartrate 25 MG Oral Tablet; Take 1 tablet twice daily; Therapy: 16MPR2065 to (Lea Hearn)  Requested for: 91Buu8849; Last   Rx:[id number] Ordered    10  Potassium Chloride ER 10 MEQ Oral Tablet Extended Release; TAKE 1 TABLET DAILY; Therapy: 99NTW6192 to (Lea Hearn)  Requested for: 51Fqv4896; Last    Rx:55Bog9895 Ordered    11  Ciprofloxacin HCl - 500 MG Oral Tablet; TAKE 1 TABLET TWICE DAILY;     Therapy: 57ZLR8201 to (22 [phone])  Requested for: 65BWX8507; Last    Rx:17Oct2017 Ordered    12  Aspirin 81 MG TABS; TAKE 1 TABLET DAILY; Therapy: (Recorded:17Aug2017) to Recorded    Future Appointments    Date/Time Provider Specialty Site   02/20/2018 02:00 PM JAVIER Luque   Cardiology [id number] [facility]     Patient Care Team    Care Team Member Role Specialty Office Number   Stella Espinoza MD  Cardiology [phone] [facility]  Physician Assistant [phone]   [facility]  Physician Assistant [phone]   [place] [facility]  Physician Assistant [phone]     Signatures   Electronically signed by : Kristi Yepez RN; Jan 5 2018 12:35PM EST                       (Author)

## 2022-09-13 ENCOUNTER — TRANSCRIPTION ENCOUNTER (OUTPATIENT)
Age: 37
End: 2022-09-13

## 2022-09-13 VITALS
SYSTOLIC BLOOD PRESSURE: 122 MMHG | RESPIRATION RATE: 18 BRPM | OXYGEN SATURATION: 97 % | DIASTOLIC BLOOD PRESSURE: 81 MMHG | HEART RATE: 60 BPM | TEMPERATURE: 98 F

## 2022-09-13 LAB — SURGICAL PATHOLOGY STUDY: SIGNIFICANT CHANGE UP

## 2022-09-13 PROCEDURE — 88342 IMHCHEM/IMCYTCHM 1ST ANTB: CPT

## 2022-09-13 PROCEDURE — 94002 VENT MGMT INPAT INIT DAY: CPT

## 2022-09-13 PROCEDURE — 70553 MRI BRAIN STEM W/O & W/DYE: CPT

## 2022-09-13 PROCEDURE — 88341 IMHCHEM/IMCYTCHM EA ADD ANTB: CPT

## 2022-09-13 PROCEDURE — 81025 URINE PREGNANCY TEST: CPT

## 2022-09-13 PROCEDURE — 84132 ASSAY OF SERUM POTASSIUM: CPT

## 2022-09-13 PROCEDURE — C1769: CPT

## 2022-09-13 PROCEDURE — 97162 PT EVAL MOD COMPLEX 30 MIN: CPT

## 2022-09-13 PROCEDURE — 85014 HEMATOCRIT: CPT

## 2022-09-13 PROCEDURE — A9585: CPT

## 2022-09-13 PROCEDURE — 97110 THERAPEUTIC EXERCISES: CPT

## 2022-09-13 PROCEDURE — 80048 BASIC METABOLIC PNL TOTAL CA: CPT

## 2022-09-13 PROCEDURE — 92611 MOTION FLUOROSCOPY/SWALLOW: CPT

## 2022-09-13 PROCEDURE — 97165 OT EVAL LOW COMPLEX 30 MIN: CPT

## 2022-09-13 PROCEDURE — C1713: CPT

## 2022-09-13 PROCEDURE — 88307 TISSUE EXAM BY PATHOLOGIST: CPT

## 2022-09-13 PROCEDURE — 71045 X-RAY EXAM CHEST 1 VIEW: CPT

## 2022-09-13 PROCEDURE — P9045: CPT

## 2022-09-13 PROCEDURE — 82803 BLOOD GASES ANY COMBINATION: CPT

## 2022-09-13 PROCEDURE — 88334 PATH CONSLTJ SURG CYTO XM EA: CPT

## 2022-09-13 PROCEDURE — 74230 X-RAY XM SWLNG FUNCJ C+: CPT

## 2022-09-13 PROCEDURE — 92526 ORAL FUNCTION THERAPY: CPT

## 2022-09-13 PROCEDURE — 97116 GAIT TRAINING THERAPY: CPT

## 2022-09-13 PROCEDURE — 88360 TUMOR IMMUNOHISTOCHEM/MANUAL: CPT

## 2022-09-13 PROCEDURE — 92523 SPEECH SOUND LANG COMPREHEN: CPT

## 2022-09-13 PROCEDURE — 83735 ASSAY OF MAGNESIUM: CPT

## 2022-09-13 PROCEDURE — 88331 PATH CONSLTJ SURG 1 BLK 1SPC: CPT

## 2022-09-13 PROCEDURE — 85396 CLOTTING ASSAY WHOLE BLOOD: CPT

## 2022-09-13 PROCEDURE — 93970 EXTREMITY STUDY: CPT

## 2022-09-13 PROCEDURE — 94003 VENT MGMT INPAT SUBQ DAY: CPT

## 2022-09-13 PROCEDURE — 85018 HEMOGLOBIN: CPT

## 2022-09-13 PROCEDURE — C1889: CPT

## 2022-09-13 PROCEDURE — 83605 ASSAY OF LACTIC ACID: CPT

## 2022-09-13 PROCEDURE — 97530 THERAPEUTIC ACTIVITIES: CPT

## 2022-09-13 PROCEDURE — 92610 EVALUATE SWALLOWING FUNCTION: CPT

## 2022-09-13 PROCEDURE — 82565 ASSAY OF CREATININE: CPT

## 2022-09-13 PROCEDURE — 70450 CT HEAD/BRAIN W/O DYE: CPT

## 2022-09-13 PROCEDURE — 82330 ASSAY OF CALCIUM: CPT

## 2022-09-13 PROCEDURE — 84100 ASSAY OF PHOSPHORUS: CPT

## 2022-09-13 PROCEDURE — 85027 COMPLETE CBC AUTOMATED: CPT

## 2022-09-13 PROCEDURE — 82947 ASSAY GLUCOSE BLOOD QUANT: CPT

## 2022-09-13 PROCEDURE — 36415 COLL VENOUS BLD VENIPUNCTURE: CPT

## 2022-09-13 PROCEDURE — 84295 ASSAY OF SERUM SODIUM: CPT

## 2022-09-13 PROCEDURE — 82435 ASSAY OF BLOOD CHLORIDE: CPT

## 2022-09-13 RX ORDER — ACETAMINOPHEN 500 MG
2 TABLET ORAL
Qty: 0 | Refills: 0 | DISCHARGE
Start: 2022-09-13

## 2022-09-13 RX ORDER — DEXAMETHASONE 0.5 MG/5ML
1 ELIXIR ORAL
Qty: 40 | Refills: 0
Start: 2022-09-13

## 2022-09-13 RX ORDER — PANTOPRAZOLE SODIUM 20 MG/1
1 TABLET, DELAYED RELEASE ORAL
Qty: 30 | Refills: 0
Start: 2022-09-13 | End: 2022-10-12

## 2022-09-13 RX ORDER — ONDANSETRON 8 MG/1
4 TABLET, FILM COATED ORAL EVERY 6 HOURS
Refills: 0 | Status: DISCONTINUED | OUTPATIENT
Start: 2022-09-13 | End: 2022-09-13

## 2022-09-13 RX ORDER — ONDANSETRON 8 MG/1
1 TABLET, FILM COATED ORAL
Qty: 56 | Refills: 0
Start: 2022-09-13 | End: 2022-09-26

## 2022-09-13 RX ORDER — ESOMEPRAZOLE MAGNESIUM 40 MG/1
1 CAPSULE, DELAYED RELEASE ORAL
Qty: 30 | Refills: 2
Start: 2022-09-13 | End: 2022-12-11

## 2022-09-13 RX ORDER — OXYCODONE HYDROCHLORIDE 5 MG/1
1 TABLET ORAL
Qty: 20 | Refills: 0
Start: 2022-09-13 | End: 2022-09-17

## 2022-09-13 RX ORDER — SODIUM CHLORIDE 0.65 %
1 AEROSOL, SPRAY (ML) NASAL
Qty: 0 | Refills: 0 | DISCHARGE
Start: 2022-09-13

## 2022-09-13 RX ADMIN — Medication 4 MILLIGRAM(S): at 12:01

## 2022-09-13 RX ADMIN — PANTOPRAZOLE SODIUM 40 MILLIGRAM(S): 20 TABLET, DELAYED RELEASE ORAL at 12:00

## 2022-09-13 RX ADMIN — Medication 1 TABLET(S): at 12:00

## 2022-09-13 RX ADMIN — Medication 4 MILLIGRAM(S): at 05:23

## 2022-09-13 RX ADMIN — ONDANSETRON 4 MILLIGRAM(S): 8 TABLET, FILM COATED ORAL at 00:36

## 2022-09-13 RX ADMIN — Medication 1 SPRAY(S): at 12:01

## 2022-09-13 NOTE — DISCHARGE NOTE PROVIDER - NSDCCPCAREPLAN_GEN_ALL_CORE_FT
PRINCIPAL DISCHARGE DIAGNOSIS  Diagnosis: Benign neoplasm of cerebral meninges  Assessment and Plan of Treatment: Please follow up with Dr. Lau in 1-2 weeks.  Your incision will be evaluated at this appointment. Any sutures or staples may be removed.  Please follow up with your Primary Care Physician as it is important to keep them updated on your health. Please call their office to make appointment.        SECONDARY DISCHARGE DIAGNOSES  Diagnosis: Paralysis of right vocal cord  Assessment and Plan of Treatment: You were seen by Dr. Bowens, ENT, for vocal cord paralysis and hypophonia.  No need for VC injection this admission and recommended to follow-up outpatient.    It was recommended that you start PROTONIX for refux noted on exam.  Please take as prescribed.    Diagnosis: Dysphagia  Assessment and Plan of Treatment: You had a Modified Barium Swallow this admission.  Seen by our speech and swallow therapists and recommended the following to prevent aspiration, as tolerated:  1) Purees and thin liquids via teaspoon only  2) Right head turn for purees with 3-4 Repeat Swallows to reduce pharyngeal residue  3) Chin tuck for thin liquids by teaspoon; 3-4 repeat swallows  4) Intermittent volitional Cough and re-swallow to clear laryngeal penetration  5) No cups, No straws  A repeat MBS is recommended in 1 month.     PRINCIPAL DISCHARGE DIAGNOSIS  Diagnosis: Benign neoplasm of cerebral meninges  Assessment and Plan of Treatment: Please follow up with Dr. Lau on 9/21/22 at 11am.  Your incision will be evaluated at this appointment. Any sutures or staples may be removed.  If you need to reschedule, please call his office.  No strenous activity. No heavy lifting. Do not return to work or operate a motor vehicle until cleared by physician. DO NOT start aspirin / NSAIDS (motrin, advil ...) until cleared by your Neurosurgeon.  You may shower but please keep incision clean and dry, do not submerge wound in water for prolonged periods of time, pat dry after showering, and do not use any creams or ointments to incision.  Please return immediately to the ED for any of the following: fevers, bleeding, swelling, pain not relieved by medication, increased sluggishness or irritability, increased nausea or vomiting, inability to tolerate foods or liquids, increased numbness/tingling/ or inability to move extremities, seizures, chest pain, shortness of breathe.  Please follow up with your Primary Care Physician as it is important to keep them updated on your health. Please call their office to make appointment.        SECONDARY DISCHARGE DIAGNOSES  Diagnosis: Paralysis of right vocal cord  Assessment and Plan of Treatment: You were seen by Dr. Allen, ENT, for vocal cord paralysis and hypophonia.  No need for VC injection this admission and recommended to follow-up outpatient.    It was recommended that you start PROTONIX for refux noted on exam.  Please take as prescribed.    Diagnosis: Dysphagia  Assessment and Plan of Treatment: You had a Modified Barium Swallow this admission.  Seen by our speech and swallow therapists and recommended the following to prevent aspiration, as tolerated:  1) Purees and thin liquids via teaspoon only  2) Right head turn for purees with 3-4 Repeat Swallows to reduce pharyngeal residue  3) Chin tuck for thin liquids by teaspoon; 3-4 repeat swallows  4) Intermittent volitional Cough and re-swallow to clear laryngeal penetration  5) No cups, No straws  A repeat MBS is recommended in 1 month.    Diagnosis: Diplopia  Assessment and Plan of Treatment: Keep eye covered as needed for visual comfort.   Keep eye lubricated as instructed and cover while sleeping to avoid corneal abrasions.     PRINCIPAL DISCHARGE DIAGNOSIS  Diagnosis: Benign neoplasm of cerebral meninges  Assessment and Plan of Treatment: Please follow up with Dr. aLu on 9/21/22 at 11am.  Your incision will be evaluated at this appointment. Any sutures or staples may be removed.  If you need to reschedule, please call his office.  No strenous activity. No heavy lifting. Do not return to work or operate a motor vehicle until cleared by physician. DO NOT start aspirin / NSAIDS (motrin, advil ...) until cleared by your Neurosurgeon.  You may shower but please keep incision clean and dry, do not submerge wound in water for prolonged periods of time, pat dry after showering, and do not use any creams or ointments to incision.  Please take DECADRON as written:  2 tab(s) orally every 6 hours x 1 day   2 tab(s) orally every 8 hours x 2 days   2 tab(s) orally every 12 hours x 2 days  1 tab orally every 8 hours x 2 days  1 tab orally every 12 hours x 2 days  1 tab orally daily x 2 days  Please return immediately to the ED for any of the following: fevers, bleeding, swelling, pain not relieved by medication, increased sluggishness or irritability, increased nausea or vomiting, inability to tolerate foods or liquids, increased numbness/tingling/ or inability to move extremities, seizures, chest pain, shortness of breathe.  Please follow up with your Primary Care Physician as it is important to keep them updated on your health. Please call their office to make appointment.        SECONDARY DISCHARGE DIAGNOSES  Diagnosis: Paralysis of right vocal cord  Assessment and Plan of Treatment: You were seen by Dr. Allen, ENT, for vocal cord paralysis and hypophonia.  No need for VC injection this admission and recommended to follow-up outpatient.    It was recommended that you start PROTONIX for refux noted on exam.  Please take as prescribed.    Diagnosis: Dysphagia  Assessment and Plan of Treatment: You had a Modified Barium Swallow this admission.  Seen by our speech and swallow therapists and recommended the following to prevent aspiration, as tolerated:  1) Purees and thin liquids via teaspoon only  2) Right head turn for purees with 3-4 Repeat Swallows to reduce pharyngeal residue  3) Chin tuck for thin liquids by teaspoon; 3-4 repeat swallows  4) Intermittent volitional Cough and re-swallow to clear laryngeal penetration  5) No cups, No straws  A repeat MBS is recommended in 1 month.    Diagnosis: Diplopia  Assessment and Plan of Treatment: Keep eye covered as needed for visual comfort.   Keep eye lubricated as instructed and cover while sleeping to avoid corneal abrasions.

## 2022-09-13 NOTE — DISCHARGE NOTE PROVIDER - CARE PROVIDERS DIRECT ADDRESSES
[email or web address]

## 2022-09-13 NOTE — DISCHARGE NOTE PROVIDER - CARE PROVIDER_API CALL
Shan Lau)  Neurosurgery  General  [address], [address]  [place] [address]  Phone: [phone]  Fax: [phone]  Follow Up Time:     Art Delacruz  OTOLARYNGOLOGY  [address] [place] [id number]  Phone: [phone]  Fax: [phone]  Follow Up Time:

## 2022-09-13 NOTE — PROGRESS NOTE ADULT - PROVIDER SPECIALTY LIST ADULT
NSICU
Neurosurgery
NSICU
Neurosurgery
Neurosurgery
NSICU
Neurosurgery
NSICU
Neurosurgery
Neurosurgery

## 2022-09-13 NOTE — DISCHARGE NOTE PROVIDER - NSDCFUADDINST_GEN_ALL_CORE_FT
1) Purees and thin liquids via teaspoon only 2) Right head turn for purees with 3-4 Repeat Swallows to reduce pharyngeal residue 3) Chin tuck for thin liquids by teaspoon; 3-4 repeat swallows 4) Intermittent volitional Cough and re-swallow to clear laryngeal penetration 5) No cups, No straws

## 2022-09-13 NOTE — PROGRESS NOTE ADULT - THIS PATIENT HAS THE FOLLOWING CONDITION(S)/DIAGNOSES ON THIS ADMISSION:
Brain Compression / Herniation
Brain Compression / Herniation
None
Brain Compression / Herniation
None

## 2022-09-13 NOTE — DISCHARGE NOTE PROVIDER - NSDCMRMEDTOKEN_GEN_ALL_CORE_FT
Home Occupational Therapy / Vision:   Home Physical Therapy:   Prenatal Multivitamins oral tablet: 1 tab(s) orally once a day   acetaminophen 325 mg oral tablet: 2 tab(s) orally every 6 hours, As needed, Temp greater or equal to 38C (100.4F), Mild Pain (1 - 3)  Home Occupational Therapy / Vision:   Home Physical Therapy:   ocular lubricant ophthalmic ointment: 1 application to each affected eye 3 times a day, As needed, lubricant  ocular lubricant ophthalmic solution: 1 drop(s) to each affected eye every 4 hours, As needed, Dry Eyes  ondansetron 4 mg oral tablet, disintegratin tab(s) orally every 6 hours MDD:4  pantoprazole 40 mg oral granule, delayed release: 1 packet(s) orally once a day MDD:1  Prenatal Multivitamins oral tablet: 1 tab(s) orally once a day  sodium chloride 0.65% nasal spray: 1 spray(s) nasal 4 times a day, As Needed   acetaminophen 325 mg oral tablet: 2 tab(s) orally every 6 hours, As needed, Temp greater or equal to 38C (100.4F), Mild Pain (1 - 3)  dexamethasone 2 mg oral tablet: 2 tab(s) orally every 6 hours x 1 day   2 tab(s) orally every 8 hours x 2 days   2 tab(s) orally every 12 hours x 2 days  1 tab orally every 8 hours x 2 days  1 tab orally every 12 hours x 2 days  1 tab orally daily x 2 days MDD:as written  esomeprazole 40 mg oral powder for reconstitution, delayed release: 1 each orally once a day MDD:1  Home Occupational Therapy / Vision:   Home Physical Therapy:   ocular lubricant ophthalmic ointment: 1 application to each affected eye 3 times a day, As needed, lubricant  ocular lubricant ophthalmic solution: 1 drop(s) to each affected eye every 4 hours, As needed, Dry Eyes  ondansetron 4 mg oral tablet, disintegratin tab(s) orally every 6 hours MDD:4  oxyCODONE 5 mg oral tablet: 1 tab(s) orally every 6 hours, As needed, discomfort MDD:4  pantoprazole 40 mg oral granule, delayed release: 1 packet(s) orally once a day MDD:1  Prenatal Multivitamins with Folic Acid 1 mg oral tablet: 1 tab(s) orally once a day MDD:1  sodium chloride 0.65% nasal spray: 1 spray(s) nasal 4 times a day, As Needed   acetaminophen 325 mg oral tablet: 2 tab(s) orally every 6 hours, As needed, Temp greater or equal to 38C (100.4F), Mild Pain (1 - 3)  dexamethasone 2 mg oral tablet: 2 tab(s) orally every 6 hours x 1 day   2 tab(s) orally every 8 hours x 2 days   2 tab(s) orally every 12 hours x 2 days  1 tab orally every 8 hours x 2 days  1 tab orally every 12 hours x 2 days  1 tab orally daily x 2 days MDD:as written  esomeprazole 40 mg oral powder for reconstitution, delayed release: 1 each orally once a day MDD:1  Home Occupational Therapy / Vision:   Home Physical Therapy:   ocular lubricant ophthalmic ointment: 1 application to each affected eye 3 times a day, As needed, lubricant  ocular lubricant ophthalmic solution: 1 drop(s) to each affected eye every 4 hours, As needed, Dry Eyes  ondansetron 4 mg oral tablet, disintegratin tab(s) orally every 6 hours MDD:4  oxyCODONE 5 mg oral tablet: 1 tab(s) orally every 6 hours, As needed, discomfort MDD:4  Prenatal Multivitamins with Folic Acid 1 mg oral tablet: 1 tab(s) orally once a day MDD:1  sodium chloride 0.65% nasal spray: 1 spray(s) nasal 4 times a day, As Needed

## 2022-09-13 NOTE — DISCHARGE NOTE PROVIDER - HOSPITAL COURSE
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 Lau and recovered in the ICU post-operatively.  Patient was seen by lactation nurse, given recent child birth, for lactation recommendations while in-patient on medications.  Recommendations provided.  Seen by speech and swallow, given post-operative difficulties swallowing, phonating.  Nutrition evaluation for adequate intake given recommended dietary needs.  Patient evaluated by ENT for vocal cord paralysis, recommend follow-up outpatient.  Post-operative imaging done and reviewed by neurosurgeon. Patient seen by physical therapy and Occupational therapy, both whom recommend Home with outpatient therapies but patient interested in home services.  Patient cleared by neurosurgery and medically stable for discharge.

## 2022-09-13 NOTE — DISCHARGE NOTE PROVIDER - NSDCFUSCHEDAPPT_GEN_ALL_CORE_FT
Tarsha Newell  [facility]  NEUROSURG [address] [place] [address]  Scheduled Appointment: 09/21/2022    Shan Lau  [facility]  NEUROSURG [address] [place] [address]  Scheduled Appointment: 10/21/2022

## 2022-09-13 NOTE — PROGRESS NOTE ADULT - SUBJECTIVE AND OBJECTIVE BOX
SUBJECTIVE:  Patient seen and examined with  at bedside, discharge today.    OBJECTIVE: episode of vomiting.    Vital Signs Last 24 Hrs  T(C): 37.1 (13 Sep 2022 05:50), Max: 37.1 (12 Sep 2022 13:00)  T(F): 98.7 (13 Sep 2022 05:50), Max: 98.7 (12 Sep 2022 13:00)  HR: 58 (13 Sep 2022 05:50) (52 - 87)  BP: 118/77 (13 Sep 2022 05:50) (118/77 - 130/90)  BP(mean): --  RR: 18 (13 Sep 2022 05:50) (18 - 18)  SpO2: 94% (13 Sep 2022 05:50) (94% - 99%)    Parameters below as of 13 Sep 2022 05:50  Patient On (Oxygen Delivery Method): room air      PHYSICAL EXAM:    Constitutional: No Acute Distress     Neurological: Awake, alert ox3, RT CN6 palsy, RT CN7 palsy, RT CN9 palsy,  PERRL 3 mm bilat, FC, HAYWARD 5/5.     Pulmonary: Clear to Auscultation, No rales, No rhonchi, No wheezes   Cardiovascular: S1, S2, Regular rate and rhythm   Gastrointestinal: Soft, Non-tender, Non-distended   Extremities: No calf tenderness     Incision: stitches intact, no ssx of csf leak/collection.     LABS: No new labs, 9/10 labs stable     MEDICATIONS  (STANDING):  dexAMETHasone     Tablet 4 milliGRAM(s) Oral every 6 hours  enoxaparin Injectable 40 milliGRAM(s) SubCutaneous <User Schedule>  pantoprazole   Suspension 40 milliGRAM(s) Oral daily  polyethylene glycol 3350 17 Gram(s) Oral every 12 hours  prenatal multivitamin 1 Tablet(s) Oral daily  senna 2 Tablet(s) Oral at bedtime  sodium chloride 0.65% Nasal 1 Spray(s) Both Nostrils four times a day    MEDICATIONS  (PRN):  acetaminophen     Tablet .. 650 milliGRAM(s) Oral every 6 hours PRN Temp greater or equal to 38C (100.4F), Mild Pain (1 - 3)  artificial  tears Solution 1 Drop(s) Both EYES every 4 hours PRN Dry Eyes  ondansetron Injectable 4 milliGRAM(s) IV Push every 6 hours PRN Nausea and/or Vomiting  oxyCODONE    IR 5 milliGRAM(s) Oral every 6 hours PRN discomfort  oxyCODONE    IR 10 milliGRAM(s) Oral every 6 hours PRN Severe Pain (7 - 10)  petrolatum Ophthalmic Ointment 1 Application(s) Right EYE three times a day PRN lubricant      IMAGING:     < from: MR Head w/wo IV Cont (09.09.22 @ 16:18) >  IMPRESSION: Post resection of right kareem clinoid meningioma with a small   amount of residual in the prepontine cistern and in the right internal   auditory canal. Resolution of mass effect on the right portion of the   vilma and medulla compared with 9/6/2022.    --- End of Report ---

## 2022-09-13 NOTE — PROVIDER CONTACT NOTE (OTHER) - SITUATION
On assessment, pt verbalizing that she threw up her soup 3x back to back. Pt vitals WDL, denies any pain or discomfort.

## 2022-09-13 NOTE — PROGRESS NOTE ADULT - NS ATTEND AMEND GEN_ALL_CORE FT
Examined the patient right 6th and 7th nerve palsy slowly improving.   MBS today and plan discharge soon.
Examined the patient right 6th and 7th nerve palsy slowly improving.   MBS today and plan discharge soon.

## 2022-09-13 NOTE — PROGRESS NOTE ADULT - ASSESSMENT
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 Pt. 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 Pt. had COVID-19 infection on 2/2022 with symptoms of cough and sore throat - recovered at home. Pt. denies recent fever, chills, chest pain, SOB, changes in bowel/urinary habits. Pt. scheduled to see Dr. Bowens pre-op for baseline audiology and cranial nerve function-has appointment on 9/2/22.    NEURO:  MR reviewed and discussed with patient/.  Cont decadron, will have taper plan upon DC.  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  Can cover eye to help with diploplia.  Defer gold weight eval for now per patient    PULM:  Incentive spirometry as tolerated  RA sats >92%    CV:  Keep -150mmHg    RENAL:   IVL    GI:   pureed diet as of yesterday, mild concerns with liquids.   Reccs per nutrition and dietary in DC paperwork  cont bowel regimen  Zofran PRN    ENDO:   Euglycemia (-180)    HEME/ONC:   LE dopp negative  SCDs, SQL    Dispo: Home with outpatient PT/OT/Vision, working on getting home PT/OT.  OT to see before potential DC.     Discussed with patient and family wound care, follow up plans, activity, and medications. Questions answered, and they verbalized understanding.    d/w Dr. Lau  [id number]    spoke to pharmacy, patient able to pump and give baby milk with even while on decadron.  No need to pump and dump.,  Fridge ordered for room while patient is here.

## 2022-09-22 ENCOUNTER — APPOINTMENT (OUTPATIENT)
Dept: NEUROSURGERY | Facility: CLINIC | Age: 37
End: 2022-09-22

## 2022-09-22 VITALS
DIASTOLIC BLOOD PRESSURE: 88 MMHG | WEIGHT: 125 LBS | HEIGHT: 64 IN | TEMPERATURE: 97.3 F | HEART RATE: 87 BPM | SYSTOLIC BLOOD PRESSURE: 122 MMHG | BODY MASS INDEX: 21.34 KG/M2 | OXYGEN SATURATION: 98 %

## 2022-09-22 PROCEDURE — 99024 POSTOP FOLLOW-UP VISIT: CPT

## 2022-09-29 NOTE — HISTORY OF PRESENT ILLNESS
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   Because she presented with worsening of the symptoms postpartum was initially related to pregnancy however brain MRI was performed and revealed a large cerebellopontine angle meningioma with compression of the brainstem, involvement of all cranial foramina  and mass effect over the brainstem.  The patient's neurological exam reveals a right partial sixth nerve palsy, right sensorineural hearing loss, but no lower cranial nerve deficit.  The MRI reveals a 4 cm cerebellopontine angle meningioma that encase  some branches of the basilar artery and encase all cranial foramen from Meckel's cave to Dorello's canal to internal auditory canal to the jugular foramen and all the way  to the VB junction at the skull base.  The tumor exerts mass effect on the brainstem without edema.  Considering the patient's young age, symptoms and size of this tumor, we decided for surgical resection of this lesion.  We had a strategy of the near  total resection with remnant inside all neural foramina as the removal of those will result in significant long-term neurological cranial nerve deficit.  To note that the petrous bone around the jugular bulb was hyper intense on imaging and reveals evidence  of involvement by the tumor itself. \par \par On 9/7/22 she underwent a right retrosigmoid craniotomy (extended retrosigmoid craniotomy with drilling of the petrous bone and exposure of the jugular bulb, Salazar  IV resection of a cerebellopontine angle large complex meningioma with compression of the brainstem and involvement of all neural foramina).\par \par Pathology showed Meningioma WHO Grade I\par \par Today she presents for her first post op visit.  Right facial weakness present.  Pt states that she is feeling better overall.  She did not have to take any Tylenol yesterday.  She reports that she is not pooling any secretions and participates in PT/OT and ST.\par \par Right retrosigmoid craniotomy is healing well without any evidence of redness, drainage and she denies fevers.  Nylon sutures intact.

## 2022-09-29 NOTE — ASSESSMENT
[FreeTextEntry1] : IMPRESSION:\par 1. Meningioma WHO Grade 1.\par 2. Right facial weakness and double vision.  Pt has glasses taped at this point.  \par 3. Surgical incision does not show any signs of infection and pt denies fevers.\par \par \par PLAN:\par 1. F/U with Dr. Lau and NP in 1 month.\par 2. Continue PT/OT and ST \par 3. Okay to wash hair and pat dry the incision thoroughly.\par 4. Sutures removed without any difficulty.\par \par

## 2022-09-29 NOTE — PHYSICAL EXAM
[General Appearance - Alert] : alert [General Appearance - Well Nourished] : well nourished [General Appearance - Well-Appearing] : healthy appearing [Clean] : clean [Dry] : dry [Healing Well] : healing well [No Drainage] : without drainage [Normal Skin] : normal [Oriented To Time, Place, And Person] : oriented to person, place, and time [Affect] : the affect was normal [Memory Recent] : recent memory was not impaired [Person] : oriented to person [Place] : oriented to place [Time] : oriented to time [Involuntary Movements] : no involuntary movements were seen [] : no respiratory distress [Respiration, Rhythm And Depth] : normal respiratory rhythm and effort [Exaggerated Use Of Accessory Muscles For Inspiration] : no accessory muscle use [Heart Rate And Rhythm] : heart rate was normal and rhythm regular [Erythema] : not erythematous [Tender] : not tender [Warm] : not warm [Indurated] : not indurated [Fluctuant] : not fluctuant [FreeTextEntry1] : right retrosigmoid incision

## 2022-09-30 ENCOUNTER — APPOINTMENT (OUTPATIENT)
Dept: NEUROSURGERY | Facility: CLINIC | Age: 37
End: 2022-09-30

## 2022-10-05 NOTE — OB RN PATIENT PROFILE - NS_PRENATALLABSOURCEGBSBACTDT_OBGYN_ALL_OB
Advancement-Rotation Flap Text: The defect edges were debeveled with a #15 scalpel blade.  Given the location of the defect, shape of the defect and the proximity to free margins an advancement-rotation flap was deemed most appropriate.  Using a sterile surgical marker, an appropriate flap was drawn incorporating the defect and placing the expected incisions within the relaxed skin tension lines where possible. The area thus outlined was incised deep to adipose tissue with a #15 scalpel blade.  The skin margins were undermined to an appropriate distance in all directions utilizing iris scissors. 11-May-2022

## 2022-10-06 ENCOUNTER — RESULT REVIEW (OUTPATIENT)
Age: 37
End: 2022-10-06

## 2022-10-11 ENCOUNTER — APPOINTMENT (OUTPATIENT)
Dept: OTOLARYNGOLOGY | Facility: CLINIC | Age: 37
End: 2022-10-11

## 2022-10-11 ENCOUNTER — OUTPATIENT (OUTPATIENT)
Dept: OUTPATIENT SERVICES | Facility: HOSPITAL | Age: 37
LOS: 1 days | End: 2022-10-11
Payer: COMMERCIAL

## 2022-10-11 ENCOUNTER — APPOINTMENT (OUTPATIENT)
Dept: RADIOLOGY | Facility: HOSPITAL | Age: 37
End: 2022-10-11

## 2022-10-11 DIAGNOSIS — Z98.890 OTHER SPECIFIED POSTPROCEDURAL STATES: Chronic | ICD-10-CM

## 2022-10-11 DIAGNOSIS — Z00.00 ENCOUNTER FOR GENERAL ADULT MEDICAL EXAMINATION WITHOUT ABNORMAL FINDINGS: ICD-10-CM

## 2022-10-11 DIAGNOSIS — Z98.891 HISTORY OF UTERINE SCAR FROM PREVIOUS SURGERY: Chronic | ICD-10-CM

## 2022-10-11 PROCEDURE — 31579 LARYNGOSCOPY TELESCOPIC: CPT

## 2022-10-11 PROCEDURE — 99214 OFFICE O/P EST MOD 30 MIN: CPT | Mod: 25

## 2022-10-11 PROCEDURE — 74230 X-RAY XM SWLNG FUNCJ C+: CPT

## 2022-10-11 PROCEDURE — 74230 X-RAY XM SWLNG FUNCJ C+: CPT | Mod: 26

## 2022-10-14 DIAGNOSIS — K21.9 GASTRO-ESOPHAGEAL REFLUX DISEASE WITHOUT ESOPHAGITIS: ICD-10-CM

## 2022-10-17 ENCOUNTER — APPOINTMENT (OUTPATIENT)
Dept: OPHTHALMOLOGY | Facility: CLINIC | Age: 37
End: 2022-10-17

## 2022-10-17 ENCOUNTER — NON-APPOINTMENT (OUTPATIENT)
Age: 37
End: 2022-10-17

## 2022-10-17 PROCEDURE — 92014 COMPRE OPH EXAM EST PT 1/>: CPT

## 2022-10-17 PROCEDURE — 92060 SENSORIMOTOR EXAMINATION: CPT

## 2022-10-28 ENCOUNTER — APPOINTMENT (OUTPATIENT)
Dept: NEUROSURGERY | Facility: CLINIC | Age: 37
End: 2022-10-28

## 2022-10-28 VITALS
SYSTOLIC BLOOD PRESSURE: 120 MMHG | HEIGHT: 64 IN | BODY MASS INDEX: 21.34 KG/M2 | OXYGEN SATURATION: 98 % | HEART RATE: 87 BPM | WEIGHT: 125 LBS | DIASTOLIC BLOOD PRESSURE: 85 MMHG

## 2022-10-28 PROCEDURE — 99024 POSTOP FOLLOW-UP VISIT: CPT

## 2022-10-31 NOTE — PHYSICAL EXAM
[General Appearance - Alert] : alert [General Appearance - Well Nourished] : well nourished [General Appearance - Well-Appearing] : healthy appearing [Oriented To Time, Place, And Person] : oriented to person, place, and time [Affect] : the affect was normal [Person] : oriented to person [Place] : oriented to place [Time] : oriented to time [] : no respiratory distress [Respiration, Rhythm And Depth] : normal respiratory rhythm and effort [Exaggerated Use Of Accessory Muscles For Inspiration] : no accessory muscle use [Heart Rate And Rhythm] : heart rate was normal and rhythm regular

## 2022-11-03 NOTE — ASSESSMENT
[FreeTextEntry1] : IMPRESSION:\par 1. Meningioma WHO Grade 1.\par 2. Right facial weakness and double vision present. Pt has glasses  right eye prism. \par 3. Surgical incision does not show any signs of infection and pt denies fevers.\par \par \par PLAN:\par 1. F/U with ENT and neuro ophthalmologist as scheduled.\par 2. Continue OT at [facility].\par 3. MRI brain w/wo contrast in 1 year after surgery 3/2023.\par

## 2022-11-03 NOTE — HISTORY OF PRESENT ILLNESS
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 The patient's neurological exam reveals a right partial sixth nerve palsy, right sensorineural hearing loss, but no lower cranial nerve deficit. The MRI reveals a 4 cm cerebellopontine angle meningioma that encase some branches of the basilar artery and encase all cranial foramen from Meckel's cave to Dorello's canal to internal auditory canal to the jugular foramen and all the way to the VB junction at the skull base. The tumor exerts mass effect on the brainstem without edema. Considering the patient's young age, symptoms and size of this tumor, we decided for surgical resection of this lesion. We had a strategy of the near total resection with remnant inside all neural foramina as the removal of those will result in significant long-term neurological cranial nerve deficit. To note that the petrous bone around the jugular bulb was hyper intense on imaging and reveals evidence of involvement by the tumor itself. \par \par On 9/7/22 she underwent a right retrosigmoid craniotomy (extended retrosigmoid craniotomy with drilling of the petrous bone and exposure of the jugular bulb, Salazar IV resection of a cerebellopontine angle large complex meningioma with compression of the brainstem and involvement of all neural foramina).\par \par Pathology showed Meningioma WHO Grade I\par \par Today she presents for her second post op visit. Right facial weakness is much improved and she has followed up with neuro ophthalmologist Dr. Lizbeth Clemons and ENT Dr. Delacruz. Pt states that she is feeling better overall and takes Tylenol prn. She reports occasional bouts of coughing with vomiting for about 3 weeks that starts with a tickling sensation in the throat.  She also reports that double vision worsened since surgery and wears a right eye prism.\par \par Right retrosigmoid craniotomy is mostly healed.\par

## 2022-11-23 NOTE — CONSULT LETTER
[Dear  ___] : Dear  [unfilled], [Consult Letter:] : I had the pleasure of evaluating your patient, [unfilled]. [Please see my note below.] : Please see my note below. [Consult Closing:] : Thank you very much for allowing me to participate in the care of this patient.  If you have any questions, please do not hesitate to contact me. [Sincerely,] : Sincerely, [FreeTextEntry2] : Dear Dr. Bowens [FreeTextEntry3] :  Art Delacruz MD, PhD \par Chief, Division of Laryngology \par Department of Otolaryngology \par [facility] \par Pediatric Otolaryngology, [facility] \par  of Otolaryngology \par [facility]

## 2022-11-23 NOTE — HISTORY OF PRESENT ILLNESS
[de-identified] : h/o R CPA tumor, decreased right hearing and double vision\par seen by dr gandara\par Diagnosed with meningioma, excision on 9/7\par Having some hearing loss. \par Having dysphagia following surgery, \par Seen by Dr. Allen previously, has vocal cord paralysis. \par Currently receiving therapy\par Had a MBS test done today, no aspiration with food.  there is still some pharyngeal clearance of solids \par Peds ED attending. \par Currently on Nexium 40 mg\par \par

## 2022-12-19 ENCOUNTER — APPOINTMENT (OUTPATIENT)
Dept: OPHTHALMOLOGY | Facility: CLINIC | Age: 37
End: 2022-12-19
Payer: COMMERCIAL

## 2022-12-19 ENCOUNTER — NON-APPOINTMENT (OUTPATIENT)
Age: 37
End: 2022-12-19

## 2022-12-19 PROCEDURE — 92060 SENSORIMOTOR EXAMINATION: CPT

## 2022-12-19 PROCEDURE — 92014 COMPRE OPH EXAM EST PT 1/>: CPT

## 2022-12-20 NOTE — OB RN DELIVERY SUMMARY - BABY A: APGAR 5 MIN REFLEX IRRITABILITY, DELIVERY
Show Applicator Variable?: Yes Render Post-Care Instructions In Note?: no Consent: The patient's consent was obtained including but not limited to risks of crusting, scabbing, blistering, scarring, darker or lighter pigmentary change, recurrence, incomplete removal and infection. Detail Level: Detailed (2) cough or sneeze Duration Of Freeze Thaw-Cycle (Seconds): 10 Post-Care Instructions: I reviewed with the patient in detail post-care instructions. Patient is to wear sunprotection, and avoid picking at any of the treated lesions. Pt may apply Vaseline to crusted or scabbing areas. Number Of Freeze-Thaw Cycles: 1 freeze-thaw cycle

## 2023-01-04 ENCOUNTER — APPOINTMENT (OUTPATIENT)
Dept: OTOLARYNGOLOGY | Facility: CLINIC | Age: 38
End: 2023-01-04
Payer: COMMERCIAL

## 2023-01-04 VITALS
BODY MASS INDEX: 20.49 KG/M2 | HEIGHT: 64 IN | HEART RATE: 83 BPM | SYSTOLIC BLOOD PRESSURE: 121 MMHG | WEIGHT: 120 LBS | DIASTOLIC BLOOD PRESSURE: 89 MMHG

## 2023-01-04 PROCEDURE — 92567 TYMPANOMETRY: CPT

## 2023-01-04 PROCEDURE — 92557 COMPREHENSIVE HEARING TEST: CPT

## 2023-01-04 RX ORDER — NORETHINDRONE 0.35 MG/1
0.35 TABLET ORAL DAILY
Qty: 1 | Refills: 6 | Status: COMPLETED | COMMUNITY
Start: 2022-07-21 | End: 2023-01-04

## 2023-01-04 RX ORDER — PRENATAL VIT/IRON FUM/FOLIC AC 27 MG-1 MG
TABLET ORAL
Refills: 0 | Status: COMPLETED | COMMUNITY
End: 2023-01-04

## 2023-01-04 RX ORDER — FAMOTIDINE 20 MG
TABLET ORAL
Refills: 0 | Status: COMPLETED | COMMUNITY
End: 2023-01-04

## 2023-01-04 RX ORDER — ESOMEPRAZOLE MAGNESIUM 40 MG/1
40 FOR SUSPENSION ORAL
Qty: 30 | Refills: 2 | Status: COMPLETED | COMMUNITY
Start: 2022-10-11 | End: 2023-01-04

## 2023-01-04 RX ORDER — PNV/FERROUS SULFATE/FOLIC ACID 27-<0.5MG
TABLET ORAL
Refills: 0 | Status: COMPLETED | COMMUNITY
End: 2023-01-04

## 2023-01-04 NOTE — HISTORY OF PRESENT ILLNESS
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) follow up for dysphonia\par h/o R CPA tumor, decreased right hearing and double vision\par Diagnosed with meningioma, excision on 9/7\par Having some Right ear hearing loss - 9/2/22 last audio test \par Having dysphagia to both solids and liquids following surgery, has coughing fits with food regurgitation a few times a week\par Currently receiving speech therapy x2 a week - feels like it is helping \par Had a MBS test done 10/11/22, No evidence of aspiration or penetration on administration of thin liquids, pureed, soft solids, and regular solids.\par Currently on Nexium 40 mg \par states having cough during meal time or after. Denies have any problem in the nose. States hearing diminish during procedure.

## 2023-01-04 NOTE — CONSULT LETTER
[Please see my note below.] : Please see my note below. [Sincerely,] : Sincerely, [Dear  ___] : Dear  [unfilled], [Consult Letter:] : I had the pleasure of evaluating your patient, [unfilled]. [Consult Closing:] : Thank you very much for allowing me to participate in the care of this patient.  If you have any questions, please do not hesitate to contact me. [FreeTextEntry2] : Dear Dr. Bowens [FreeTextEntry3] :  Art Delacruz MD, PhD \par Chief, Division of Laryngology \par Department of Otolaryngology \par [facility] \par Pediatric Otolaryngology, [facility] \par  of Otolaryngology \par [facility]

## 2023-01-06 ENCOUNTER — NON-APPOINTMENT (OUTPATIENT)
Age: 38
End: 2023-01-06

## 2023-01-25 ENCOUNTER — APPOINTMENT (OUTPATIENT)
Dept: OTOLARYNGOLOGY | Facility: CLINIC | Age: 38
End: 2023-01-25
Payer: COMMERCIAL

## 2023-01-25 VITALS
HEART RATE: 69 BPM | SYSTOLIC BLOOD PRESSURE: 129 MMHG | HEIGHT: 64 IN | WEIGHT: 120 LBS | DIASTOLIC BLOOD PRESSURE: 87 MMHG | BODY MASS INDEX: 20.49 KG/M2

## 2023-01-25 PROCEDURE — 99214 OFFICE O/P EST MOD 30 MIN: CPT

## 2023-01-25 PROCEDURE — 92557 COMPREHENSIVE HEARING TEST: CPT

## 2023-01-25 PROCEDURE — 92567 TYMPANOMETRY: CPT

## 2023-01-30 ENCOUNTER — NON-APPOINTMENT (OUTPATIENT)
Age: 38
End: 2023-01-30

## 2023-02-06 ENCOUNTER — APPOINTMENT (OUTPATIENT)
Dept: PHARMACY | Facility: CLINIC | Age: 38
End: 2023-02-06

## 2023-02-06 ENCOUNTER — APPOINTMENT (OUTPATIENT)
Dept: OTOLARYNGOLOGY | Facility: CLINIC | Age: 38
End: 2023-02-06
Payer: COMMERCIAL

## 2023-02-06 PROCEDURE — 92524 BEHAVRAL QUALIT ANALYS VOICE: CPT | Mod: GN

## 2023-02-06 PROCEDURE — 92610 EVALUATE SWALLOWING FUNCTION: CPT | Mod: GN

## 2023-02-06 PROCEDURE — 92520 LARYNGEAL FUNCTION STUDIES: CPT | Mod: GN

## 2023-02-06 NOTE — REASON FOR VISIT
[Initial] : initial visit for [Clinical Swallow] : clinical swallow evaluation [Voice Evaluation] : voice evaluation

## 2023-02-07 NOTE — PROCEDURE
[de-identified] : HHIA was performed and pts Total score of 22% and her S score of 29% fell in the Mild to Moderate Handicap range.  Her E score of 15% fell in the No Handicap range. \par \par Pt was counselled re: available nonsurgical options, Adhear, CROS and Baha sofband and fitting procedures.  Discussed limitations of both devices in providing benefit in background noise.  PErformed demo of Fredi ADHEAR and [employer]on CROS hearing aids.  Pt did not note much difference in the quiet office. \par \par Testing was performed using the [place] BIO in the unaided and aided conditions with both devices at S/N ratio of +5 and +2dB. The speech signal was presented through the right speaker with noise presented through the left, with the pt seated in the middle facing forward.   Scores were as follows: \par Unaided\par   S/N=+5: 83%\par   S/N=+2: 63%\par \par AIded with Fredi ADHEAR\par   S/N=+5db: 96%\par   S/N=+2dB: 47%\par \par Oticon CROS\par   S/N=+5dB 93%\par S/N= +2dB:75%\par \par Results indicated most benefit from either amplification option at 5dB.  Despite scores at +2dB, pt preferred Adhear and she understands limits in very noisy settings.  She stated that she needs to use a stethoscope in the good ear on a regular basis and was not really interested in using a hearing aid.  [employer] intake form completed for packet to be submitted to [employer].  Black device with brown adhesives were chosen.  PT was provided contact number to follow up directly with [facility] in 3-4 weeks. \par

## 2023-02-07 NOTE — HISTORY OF PRESENT ILLNESS
[FreeTextEntry1] : 37 year old female referred for consultation by ENT Dr. Alon Pacheco.  Hx of CPA tumor(meningioma) resected by Dr. Lau on 9/7/22 and vocal cord paralysis, 6th and 7th nerve palsy, followed by ENT Dr. Delacruz.  Pt also receives speech therapy. \par \par Mild hearing loss first dx in August prior to surgery.  Post Op Audio revealed hearing WNL in the left ear and a profound sensorineural hearing loss in the right ear.  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  Works in a noisy stressful environment and wants to know what amplification options are available. Is primarily interested in ADHEAR.  Dr. Akhtar denies dizziness, tinnitus,otalgia and otorrhea.

## 2023-02-09 NOTE — ASSESSMENT
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 Delacruz, to rule out aspiration, assess for diet texture change as appropriate, explore positional strategies, and/or compensatory techniques as necessary and to assess overall vocal quality and function.   \par \par History of Present Illness:  \par  Shey arrived to today’s evaluation independently and was a reliable informant. The patient was received alert, pleasant, and in no apparent distress. Per charting and patient report, the patient’s PMHx is significant for R CPA tumor with meningioma excision on 22, decreased right hearing and double vision, right vocal fold immobility now s/p injection laryngoplasty 23. The patient reports no voice or swallowing difficulties noted prior to surgery in September. The patient reports a persistent dysphonia status post surgery marked by feelings of a hoarse/breathy and nasality vocal quality with persistent throat clear/coughing. The patient reports improved vocal quality noted status post injection in January; however, notes continued intermittent hoarseness and throat clear/coughing. The patient reports history of dysphagia and was last seen for a Modified Barium Swallow Study at [facility] on 10/11/22, at which time softer solids and thin liquids were recommended. The patient reports continued difficulty swallowing is noted marked by intermittent coughing during meals and continued need to implement strategies such as slower rate and avoidance of dry/denser solids. The patient further reports intermittent emesis due to coughing during meals. The patient is denying odynophagia, any feelings of shortness of breath during or following meals, or any recent pneumonias. The patient further denies any history of reflux, tobacco use, or alcohol consumption. The patient reports she has been receiving ongoing speech and swallowing therapy with [facility] since 2022.  \par \par MEDICAL HISTORY, per EMR: \par \par Active Problems \par Abnormal auditory perception of right ear (388.40) (H93.291) \par Dysphonia (784.42) (R49.0) \par Elderly primigravida in second trimester (659.53) (O09.512) \par Elderly primigravida, third trimester (659.53) (O09.513) \par Encounter for prenatal care of first pregnancy (V22.0) (Z34.00) \par Foreign body sensation in throat (784.99) (R09.89) \par LPRD (laryngopharyngeal reflux disease) (478.79) (K21.9) \par Meningioma, cerebral (225.2) (D32.0) \par Neck pain (723.1) (M54.2) \par Postpartum exam (V24.2) (Z39.2) \par Pregnancy test positive (V72.42) (Z32.01) \par \par Current Meds \par Peyton 0.35 MG Oral Tablet; TAKE 1 TABLET DAILY \par Esomeprazole Magnesium 40 MG Oral Capsule Delayed Release \par Esomeprazole Magnesium 40 MG Oral Packet; oral powder for reconstitution, delay \par release 1 each by mouth once a day \par Lubricant Drops SOLN \par \par Allergies \par No Known Allergies \par \par  \par CLINICAL FINDINGS: \par \par ORAL PERIPHERAL EXAM: Oral-Facial examination revealed facial symmetry to be unremarkable. Intra-oral examination revealed mildly reduced labio-lingual retraction/lateralization and mildly reduced mandibular strength and ROM.  Hyolaryngeal excursion was judged to be complete via digital palpation. Upon clinician verbal instruction, the patient demonstrated timely pharyngeal swallow trigger and complete hyolaryngeal excursion.  Secretion management is within functional limits and upper airway breath sounds are clear at baseline. The patient was noted with a dry cough at baseline.  \par \par SWALLOW FUNCTION: The patient was provided PO trials of pureed, soft and bite sized solids, regular solids, and thin liquids. The patient demonstrated a mild oral dysphagia for regular and softer solids marked by adequate oral acceptance with slower/prolonged mastication resulting in delayed collection and transport with trace stasis observed post swallow, which reduced with subsequent swallow and liquid wash. The patient demonstrated a judged functional oral phase for pureed and thin liquids marked by adequate oral acceptance and timely collection and transport. Piecemeal deglutition was visualized with pureed and solids. The patient demonstrated a mild pharyngeal dysphagia for regular solids marked by suspected timely pharyngeal swallow trigger, hyolaryngeal elevation noted by digital palpation, and intermittent delayed coughing noted with patient complaint of pharyngeal stasis, which patient reported was noted to clear when provided liquid wash. The patient demonstrated a judged functional pharyngeal phase for soft & bite sized solids, pureed, and thin liquids marked by suspected timely pharyngeal swallow trigger and hyolaryngeal elevation noted by digital palpation without evidence of airway penetration/aspiration. Patient reported no complaints of pharyngeal stasis and/or globus sensation with pureed, soft & bite-sized solids, or thin liquids.  \par \par VOICE:  \par \par Perceptual Voice Analysis: \par Vocal Quality: Hoarse/Strained \par Severity: Mild-Moderate \par Loudness Level: WFL \par Musculoskeletal Tension: Present \par Respiration: Thoracic  \par Resonance: Hypernasal \par Tone Focus: Back tone \par Type of Onset: hard glottal attack, occasionally  \par Laryngeal Palpation: WFL \par \par Acoustic Analysis \par The [employer] IV 3950 was utilized to obtain baseline objective vocal function data.  The following information was obtained: \par \par Sustained Phonation: \par Fundamental Frequency =   183.0 Hz.  (Normal 171-222 Hz.) \par Frequency Range = 154.99 Hz.  (Normal 36.03 Hz.) \par Habitual Intensity =74.10dB. (Normal 106 dB.) \par Frequency Perturbation = 4.022%   (Normal <1%) \par Amplitude Perturbation = 10.661 % (Normal <.33 %.) \par \par Conversational Speech: \par Fundamental Frequency = 158.13 Hz. 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 She demonstrated perilaryngeal muscle tension and reduced phonatory-respiratory coordination throughout assessment, which was noted to increase when the patient engaged in phonatory tasks of increasing length/complexity. Acoustic measures indicated adequate fundamental frequency, increased frequency range, reduced habitual intensity, increased frequency perturbation, and increased amplitude perturbation. In addition, reduced fundamental frequency and reduced intensity were further measured during conversational speech. The patient presented with reduced max phonation time when compared to females within her age range.  \par \par Voice Handicap Index: Dr. Kat had a self-rating score of 27/120, suggesting a mild impact upon lifestyle.  \par \par Impressions: \par 1. Mild oropharyngeal dysphagia \par 2. Mild-moderate dysphonia  \par \par Prognosis: Good with therapeutic intervention and adherence with HEP \par \par Recommendations:  \par 1. The patient was advised to consume a diet consisting of soft & bite-sized solids and thin liquids. \par 2. The patient was advised to consume small bites/sips at a decreased rate of consumption.  \par 3. The patient was advised to position herself upright (no <90 degrees) during all meals and for 1 hour after while maintaining reflux precautions.  \par 4. An objective swallow study of a Fiberoptic Endoscopic Evaluation of Swallowing (FEES) is recommended to further assess swallow mechanism given history of dysphagia and vocal fold paralysis. \par 5) Continued dysphagia therapy (CPT-56936) and voice therapy (CPT- 64978) is recommended 1x/week for 8 weeks to maximize swallow function and improve overall vocal quality.  \par 6) Follow up with referring physician, as directed.  \par \par Education: Counseling and patient education were completed in regard to optimal eating strategies (i.e. small bites/sips and alternating solids with liquids at decreased rate of consumption) and on vocal hygiene/voice conservation at the end of today’s assessment.  \par \par Tanisha Iraheta M.A. CCC-SLP \par Speech Language Pathologist \par [facility]  \par \par

## 2023-02-13 ENCOUNTER — NON-APPOINTMENT (OUTPATIENT)
Age: 38
End: 2023-02-13

## 2023-02-17 ENCOUNTER — NON-APPOINTMENT (OUTPATIENT)
Age: 38
End: 2023-02-17

## 2023-02-24 NOTE — DATA REVIEWED
[de-identified] : Right - profound essentially sensorineural hearing loss\par Left - hearing WNL\par Impedance testing reveals normal Type A tympanograms bilaterally\par

## 2023-02-24 NOTE — HISTORY OF PRESENT ILLNESS
[de-identified] : 38 yo F with hx of CPA tumor resected by Dr. Lau on 9/7 and vocal cord paralysis referred by Dr. Delacruz with hearing loss. Had hearing test prior to surgery with Dr. Bowens - last audiogram with worsened hearing in right ear. No tinnitus, otalgia, otorrhea, dizziness or headaches. No hx of ear infections as a child, head trauma or exposure to loud noises.

## 2023-03-01 ENCOUNTER — APPOINTMENT (OUTPATIENT)
Dept: OTOLARYNGOLOGY | Facility: CLINIC | Age: 38
End: 2023-03-01
Payer: COMMERCIAL

## 2023-03-01 DIAGNOSIS — T17.208A UNSPECIFIED FOREIGN BODY IN PHARYNX CAUSING OTHER INJURY, INITIAL ENCOUNTER: ICD-10-CM

## 2023-03-01 DIAGNOSIS — J38.3 OTHER DISEASES OF VOCAL CORDS: ICD-10-CM

## 2023-03-01 DIAGNOSIS — H93.291 OTHER ABNORMAL AUDITORY PERCEPTIONS, RIGHT EAR: ICD-10-CM

## 2023-03-01 DIAGNOSIS — J38.01 PARALYSIS OF VOCAL CORDS AND LARYNX, UNILATERAL: ICD-10-CM

## 2023-03-01 PROCEDURE — 99214 OFFICE O/P EST MOD 30 MIN: CPT | Mod: 25

## 2023-03-01 PROCEDURE — 31579 LARYNGOSCOPY TELESCOPIC: CPT

## 2023-03-01 PROCEDURE — 92612 ENDOSCOPY SWALLOW (FEES) VID: CPT | Mod: GN

## 2023-03-01 RX ORDER — BENZOCAINE/BENZALKONIUM/ALOE/E 5 %-0.13 %
CREAM (GRAM) TOPICAL
Refills: 0 | Status: ACTIVE | COMMUNITY

## 2023-03-01 NOTE — CONSULT LETTER
[Dear  ___] : Dear  [unfilled], [Consult Letter:] : I had the pleasure of evaluating your patient, [unfilled]. [Please see my note below.] : Please see my note below. [Consult Closing:] : Thank you very much for allowing me to participate in the care of this patient.  If you have any questions, please do not hesitate to contact me. [Sincerely,] : Sincerely, [FreeTextEntry2] : Dear Dr. Bowens [FreeTextEntry3] :  Art Delacruz MD, PhD \par Chief, Division of Laryngology \par Department of Otolaryngology \par [facility] \par Pediatric Otolaryngology, [facility] \par  of Otolaryngology \par [facility]

## 2023-03-01 NOTE — HISTORY OF PRESENT ILLNESS
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 \par States there is often a globus sensation while swallowing food. \par Currently receiving speech therapy x1 a week - feels like it is helping \par Currently on Nexium 40 mg \par Denies have any problem in the nose.\par States getting an MRI this Wednesday since after the surgery. \par Patient states voice has been better when talking slower.

## 2023-03-01 NOTE — ASSESSMENT
[FreeTextEntry1] : REPORT OF EVALUATION OF SWALLOW FUNCTION VIA FLEXIBLE ENDOSCOPIC EXAMINATION OF SWALLOWING (FEES)  \par \par Patient Name:  Rosie Kat \par Patient : 1985  \par Medical Diagnosis: Dysphagia  \par Treatment Diagnosis: Dysphagia \par Referring Physician:  Dr. Art Delacruz  \par \par  History:  Information on this patient has been provided by the patient. Rosie Kat was seen for a Flexible Endoscopic Examination of swallowing to rule out aspiration, assess for diet texture change as appropriate, and/or explore positional strategies and/or compensatory techniques to eliminate aspiration.   \par \par Reason For Referral: To determine patient's ability to safely tolerate an oral diet. \par \par The physician ordered this procedure because he wants the patient to meet their nutrition/hydration needs by mouth without compromising respiratory status. Today’s evaluation was completed jointly by SLP and referring ENT, Dr. Art Delacruz.  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 The patient reports history of dysphagia and was last seen for a Modified Barium Swallow Study at [facility] on 10/11/22, at which time softer solids and thin liquids were recommended. The patient is familiar to this department and was seen for a clinical voice and swallow evaluation, at which time a FEES evaluation was recommended. The patient arrives today reporting ongoing difficulty swallowing marked by intermittent coughing during meals. The patient further reports coughing “shortly” and at times up to an hour after meals, with subsequent emesis. The patient is denying odynophagia, any feelings of shortness of breath during or following meals, or any recent pneumonias. The patient reports she has been receiving ongoing speech and swallowing therapy with [facility] since 2022. \par \par MEDICAL HISTORY, per EMR:  \par \par Active Problems  \par Abnormal auditory perception of right ear (388.40) (H93.291)  \par Dysphonia (784.42) (R49.0)  \par Elderly primigravida in second trimester (659.53) (O09.512)  \par Elderly primigravida, third trimester (659.53) (O09.513)  \par Encounter for prenatal care of first pregnancy (V22.0) (Z34.00)  \par Foreign body sensation in throat (784.99) (R09.89)  \par LPRD (laryngopharyngeal reflux disease) (478.79) (K21.9)  \par Meningioma, cerebral (225.2) (D32.0)  \par Neck pain (723.1) (M54.2)  \par Postpartum exam (V24.2) (Z39.2)  \par Pregnancy test positive (V72.42) (Z32.01)  \par \par Current Meds  \par Peyton 0.35 MG Oral Tablet; TAKE 1 TABLET DAILY  \par Esomeprazole Magnesium 40 MG Oral Capsule Delayed Release  \par Esomeprazole Magnesium 40 MG Oral Packet; oral powder for reconstitution, delay  \par release 1 each by mouth once a day  \par Lubricant Drops SOLN  \par \par Allergies  \par No Known Allergies \par \par  \par Current Nutritional Intake:  Patient reports consumption of regular solids “with added moisture” and thin liquids at this time.  \par \par Current Respiratory Status:  _x_  Normal     ___  Tracheostomy Tube    \par \par ASSESSMENT \par \par Consistencies Administered:   \par Solids: Regular  and Pureed    \par Liquids: Thin liquids via single cup sips \par \par                 \par FEES PROCEDURE \par The patient was explained the FEES procedure and consent was obtained.  A nasendoscope was passed by Dr. Delacruz via the right nare without difficulty and positioned above the supraglottic structures.  The epiglottis, aryepiglottic folds, arytenoids, and true vocal folds were clearly visible with evidence of mild arytenoid edema and mild vocal fold edema, consistent with findings of ENT. Assessment of the TVF abduction and adduction revealed left side with full mobility, right side immobile in midline with adequate closure during phonation, consistent with findings of ENT.  \par \par The patient was provided with regular solids, pureed, and thin liquid textures via single cup sips for the purposes of today’s assessment. The patient demonstrated a mild oropharyngeal dysphagia. Oral phase for pureed and small cups of thin liquids was marked by adequate oral acceptance with timely transfer and AP transit time. Premature spillage was noted with larger cup sip of thin liquids. The patient demonstrated slower mastication of regular solids resulting in delayed collection and transport. Piecemeal deglutition was noted with pureed and solid consistencies. The patient’s pharyngeal stage was marked by mildly reduced base of tongue retraction and mildly delayed pharyngeal swallow trigger; however, adequate hyolaryngeal elevation and epiglottic closure. There was trace-mild stasis viewed at the valleculae, bilateral pyriform sinuses and the posterior pharyngeal wall post primary swallow of pureed and solids and larger cup sip of thin liquids, which reduced with subsequent spontaneous re-swallows performed by the patient. There was no laryngeal penetration or aspiration viewed across all PO trials administered. Of note, the patient presents with a baseline cough which was observed x2 during assessment; however, no laryngeal penetration or aspiration was viewed. \par \par There was evidence of laryngopharyngeal reflux (LPR) with each PO trial provided as marked by return of green material to the level of the pyriform sinuses and cricopharyngeal segment. Patient was sensitive to active LPR, as volitional cough response was noted x1; however, airway protection was maintained. The patient was able to reduce noted stasis upon spontaneous subsequent dry swallow. At this point testing was discontinued and the scope was carefully removed, with patient tolerating the entire procedure without difficulty.   \par \par  Aspiration - Penetration Scale: 1- pureed, regular solids, thin liquids  \par \par Aspiration - Penetration Scale (Adan et al Dysphagia 11:93-98 (1996), Aspiration-Penetration Scale) \par 1.    Material does not enter the airway \par 2.    Material enters the airway, remains above the vocal folds, and is ejected from the airway \par 3.    Material enters the airway, remains above the vocal folds, and is not ejected \par 4.    Material enters the airway, contacts the vocal folds, and is ejected from the airway \par 5.    Material enters the airway, contacts the vocal folds, and is not ejected from the airway \par 6.    Material enters the airway, passes below the vocal folds and is ejected into the larynx or out of the airway \par 7.    Material enters the airway, passes below the vocal folds, and is not ejected from the trachea despite effort \par 8.    Material enters the airway, passes below the vocal folds, and no effort is made to eject \par \par EDUCATION: Evaluation results were discussed with the patient. \par \par PROGNOSIS: good for recommended diet \par \par IMPRESSIONS: Mild oropharyngeal dysphagia  \par \par RECOMMENDATIONS: \par 1. The patient was advised to consume a regular solid (avoid denser/dry solids) and thin liquid diet level. \par 2. The patient was advised to consume small bites/sips and alternate solids with liquids at a decreased rate of consumption.\par 3. The patient was advised to position herself upright (no <90 degrees) during all meals and after for at least 1-2 hours while maintaining reflux precautions.  \par 4. Consider GI consultation given observed LPR viewed during today’s assessment and patient's ongoing complaints of coughing/emesis after meals.  \par 5. Continued swallowing therapy is recommended to maximize swallow function.  \par 6. Follow up with referring MD, as directed. \par \par Should you have any additional concerns, please contact the Center at [phone] \par \par Tanisha Iraheta M.A. CCC-SLP\par Speech Language Pathologist \par [facility]

## 2023-03-08 ENCOUNTER — OUTPATIENT (OUTPATIENT)
Dept: OUTPATIENT SERVICES | Facility: HOSPITAL | Age: 38
LOS: 1 days | End: 2023-03-08
Payer: COMMERCIAL

## 2023-03-08 ENCOUNTER — APPOINTMENT (OUTPATIENT)
Dept: MRI IMAGING | Facility: IMAGING CENTER | Age: 38
End: 2023-03-08
Payer: COMMERCIAL

## 2023-03-08 DIAGNOSIS — Z98.891 HISTORY OF UTERINE SCAR FROM PREVIOUS SURGERY: Chronic | ICD-10-CM

## 2023-03-08 DIAGNOSIS — D32.0 BENIGN NEOPLASM OF CEREBRAL MENINGES: ICD-10-CM

## 2023-03-08 DIAGNOSIS — Z00.8 ENCOUNTER FOR OTHER GENERAL EXAMINATION: ICD-10-CM

## 2023-03-08 DIAGNOSIS — Z98.890 OTHER SPECIFIED POSTPROCEDURAL STATES: Chronic | ICD-10-CM

## 2023-03-08 PROCEDURE — 70553 MRI BRAIN STEM W/O & W/DYE: CPT

## 2023-03-08 PROCEDURE — A9585: CPT

## 2023-03-08 PROCEDURE — 70553 MRI BRAIN STEM W/O & W/DYE: CPT | Mod: 26

## 2023-03-10 ENCOUNTER — APPOINTMENT (OUTPATIENT)
Dept: NEUROSURGERY | Facility: CLINIC | Age: 38
End: 2023-03-10
Payer: COMMERCIAL

## 2023-03-10 VITALS
WEIGHT: 120 LBS | BODY MASS INDEX: 20.49 KG/M2 | SYSTOLIC BLOOD PRESSURE: 116 MMHG | OXYGEN SATURATION: 99 % | HEIGHT: 64 IN | HEART RATE: 69 BPM | DIASTOLIC BLOOD PRESSURE: 83 MMHG

## 2023-03-10 PROCEDURE — 99215 OFFICE O/P EST HI 40 MIN: CPT

## 2023-03-16 NOTE — HISTORY OF PRESENT ILLNESS
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 The patient's neurological exam reveals a right partial sixth nerve palsy, right sensorineural hearing loss, but no lower cranial nerve deficit. The MRI reveals a 4 cm cerebellopontine angle meningioma that encase some branches of the basilar artery and encase all cranial foramen from Meckel's cave to Dorello's canal to internal auditory canal to the jugular foramen and all the way to the VB junction at the skull base. The tumor exerts mass effect on the brainstem without edema. Considering the patient's young age, symptoms and size of this tumor, we decided for surgical resection of this lesion. We had a strategy of the near total resection with remnant inside all neural foramina as the removal of those will result in significant long-term neurological cranial nerve deficit. To note that the petrous bone around the jugular bulb was hyper intense on imaging and reveals evidence of involvement by the tumor itself. \par \par On 9/7/22 she underwent a right retrosigmoid craniotomy (extended retrosigmoid craniotomy with drilling of the petrous bone and exposure of the jugular bulb, Salazar IV resection of a cerebellopontine angle large complex meningioma with compression of the brainstem and involvement of all neural foramina).\par \par Pathology showed Meningioma WHO Grade I\par \par Today she presents for a follow up visit after having new mri brain done. Chief complaint of double vision continues to wear prism glasses. OT recommended vision therapy.

## 2023-03-16 NOTE — REVIEW OF SYSTEMS
[As Noted in HPI] : as noted in HPI [Eyesight Problems] : eyesight problems [Negative] : Heme/Lymph [Loss Of Hearing] : hearing loss [FreeTextEntry4] : right side hearing loss

## 2023-03-16 NOTE — ASSESSMENT
[FreeTextEntry1] : IMPRESSION:\par On 9/7/22 she underwent a right retrosigmoid craniotomy (extended retrosigmoid craniotomy with drilling of the petrous bone and exposure of the jugular bulb, Salazar IV resection of a cerebellopontine angle large complex meningioma with compression of the brainstem and involvement of all neural foramina).\par Pathology showed Meningioma WHO Grade I\par Continues to have double vision wears prism lenses [place] [facility] [place] for vision therapy \par Hoarse voice undergoing injections of vocal cords \par hearing gone in right side \par minor nasolabial fold asymmetry of the right side \par MRI brain shows stable residual\par PLAN:\par MRI brain w.wo contrast in September 2023 then follow up in office after\par Continue to work with adjunct therapy

## 2023-03-20 ENCOUNTER — NON-APPOINTMENT (OUTPATIENT)
Age: 38
End: 2023-03-20

## 2023-03-20 ENCOUNTER — APPOINTMENT (OUTPATIENT)
Dept: OPHTHALMOLOGY | Facility: CLINIC | Age: 38
End: 2023-03-20
Payer: COMMERCIAL

## 2023-03-20 PROCEDURE — 92014 COMPRE OPH EXAM EST PT 1/>: CPT

## 2023-03-20 PROCEDURE — 92060 SENSORIMOTOR EXAMINATION: CPT

## 2023-03-30 ENCOUNTER — APPOINTMENT (OUTPATIENT)
Dept: NEUROSURGERY | Facility: CLINIC | Age: 38
End: 2023-03-30
Payer: COMMERCIAL

## 2023-03-30 PROCEDURE — 99442: CPT

## 2023-04-03 NOTE — HISTORY OF PRESENT ILLNESS
[Home] : at home, [unfilled] , at the time of the visit. [Medical Office: (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 The patient's neurological exam reveals a right partial sixth nerve palsy, right sensorineural hearing loss, but no lower cranial nerve deficit. The MRI reveals a 4 cm cerebellopontine angle meningioma that encase some branches of the basilar artery and encase all cranial foramen from Meckel's cave to Dorello's canal to internal auditory canal to the jugular foramen and all the way to the VB junction at the skull base. The tumor exerts mass effect on the brainstem without edema. Considering the patient's young age, symptoms and size of this tumor, we decided for surgical resection of this lesion. We had a strategy of the near total resection with remnant inside all neural foramina as the removal of those will result in significant long-term neurological cranial nerve deficit. To note that the petrous bone around the jugular bulb was hyper intense on imaging and reveals evidence of involvement by the tumor itself. \par \par On 9/7/22 she underwent a right retrosigmoid craniotomy (extended retrosigmoid craniotomy with drilling of the petrous bone and exposure of the jugular bulb, Salazar IV resection of a cerebellopontine angle large complex meningioma with compression of the brainstem and involvement of all neural foramina).\par \par Pathology showed Meningioma WHO Grade I\par \par Case was dicussed at  on 3/29/23 and recommendation is for repeat imaging in 3 months. \par

## 2023-04-03 NOTE — ASSESSMENT
[FreeTextEntry1] : IIMPRESSION:\par On 9/7/22 she underwent a right retrosigmoid craniotomy (extended retrosigmoid craniotomy with drilling of the petrous bone and exposure of the jugular bulb, Salazar IV resection of a cerebellopontine angle large complex meningioma with compression of the brainstem and involvement of all neural foramina).\par Pathology showed Meningioma WHO Grade I\par Continues to have double vision wears prism lenses [place] [employer] [facility] [place] for vision therapy \par Hoarse voice undergoing injections of vocal cords \par hearing gone in right side \par minor nasolabial fold asymmetry of the right side \par MRI brain shows stable residual\par \par PLAN:\par MRI brain w.wo contrast in July 2023 then follow up in office after\par Continue to work with adjunct therapy. \par

## 2023-04-04 NOTE — REASON FOR VISIT
[Consideration of Therapy for Benign Disease] : consideration of therapy for benign disease [Brain Tumor] : brain tumor [Other: ___] : [unfilled]

## 2023-04-05 ENCOUNTER — APPOINTMENT (OUTPATIENT)
Dept: RADIATION ONCOLOGY | Facility: CLINIC | Age: 38
End: 2023-04-05
Payer: COMMERCIAL

## 2023-04-05 VITALS
SYSTOLIC BLOOD PRESSURE: 128 MMHG | HEART RATE: 74 BPM | OXYGEN SATURATION: 100 % | RESPIRATION RATE: 18 BRPM | WEIGHT: 113.1 LBS | DIASTOLIC BLOOD PRESSURE: 93 MMHG | BODY MASS INDEX: 19.41 KG/M2 | TEMPERATURE: 97.52 F

## 2023-04-05 PROCEDURE — 99205 OFFICE O/P NEW HI 60 MIN: CPT | Mod: 25,GC

## 2023-04-05 NOTE — HISTORY OF PRESENT ILLNESS
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 \par \par The patient's neurological exam reveals a right partial sixth nerve palsy, right sensorineural hearing loss, but no lower cranial nerve deficit. The MRI reveals a 4 cm cerebellopontine angle meningioma that encase some branches of the basilar artery and encase all cranial foramen from Meckel's cave to Dorello's canal to internal auditory canal to the jugular foramen and all the way to the VB junction at the skull base. The tumor exerts mass effect on the brainstem without edema. \par \par Considering the patient's young age, symptoms and size of this tumor, she underwent surgical resection.  \par \par On 9/7/22 she underwent a right retrosigmoid craniotomy (extended retrosigmoid craniotomy with drilling of the petrous bone and exposure of the jugular bulb, Salazar IV resection of a cerebellopontine angle large complex meningioma with compression of the brainstem and involvement of all neural foramina). Pathology showed Meningioma WHO Grade I\par \par MRI on 3/8/2023 showed residual enhancing neoplasm wihtin the right cavernous sinus and prepontine/cerebellar medullary cisterns with mild increase in size compared to previous scan post-operatively.  \par \par At time of this visit, she is largely neurologically intact.  She has some persistent difficulty with diploplia.  She denies headache, nausea, or vomiting. She has no weakness in her arms or legs.  She does not have hearing in her right ear.  \par

## 2023-04-05 NOTE — VITALS
[Maximal Pain Intensity: 0/10] : 0/10 [Least Pain Intensity: 0/10] : 0/10 [90: Able to carry normal activity; minor signs or symptoms of disease.] : 90: Able to carry normal activity; minor signs or symptoms of disease.  [ECOG Performance Status: 1 - Restricted in physically strenuous activity but ambulatory and able to carry out work of a light or sedentary nature] : Performance Status: 1 - Restricted in physically strenuous activity but ambulatory and able to carry out work of a light or sedentary nature, e.g., light house work, office work [0 - No Distress] : Distress Level: 0

## 2023-04-07 ENCOUNTER — NON-APPOINTMENT (OUTPATIENT)
Age: 38
End: 2023-04-07

## 2023-04-24 ENCOUNTER — APPOINTMENT (OUTPATIENT)
Dept: NUCLEAR MEDICINE | Facility: IMAGING CENTER | Age: 38
End: 2023-04-24
Payer: COMMERCIAL

## 2023-04-24 ENCOUNTER — RESULT REVIEW (OUTPATIENT)
Age: 38
End: 2023-04-24

## 2023-04-24 ENCOUNTER — OUTPATIENT (OUTPATIENT)
Dept: OUTPATIENT SERVICES | Facility: HOSPITAL | Age: 38
LOS: 1 days | End: 2023-04-24
Payer: COMMERCIAL

## 2023-04-24 DIAGNOSIS — D32.0 BENIGN NEOPLASM OF CEREBRAL MENINGES: ICD-10-CM

## 2023-04-24 DIAGNOSIS — Z98.891 HISTORY OF UTERINE SCAR FROM PREVIOUS SURGERY: Chronic | ICD-10-CM

## 2023-04-24 DIAGNOSIS — Z98.890 OTHER SPECIFIED POSTPROCEDURAL STATES: Chronic | ICD-10-CM

## 2023-04-24 PROCEDURE — 78814 PET IMAGE W/CT LMTD: CPT

## 2023-04-24 PROCEDURE — 78814 PET IMAGE W/CT LMTD: CPT | Mod: 26,PI

## 2023-04-24 PROCEDURE — 78999 UNLISTED MISC PX DX NUC MED: CPT | Mod: 26

## 2023-04-24 PROCEDURE — 78999 UNLISTED MISC PX DX NUC MED: CPT

## 2023-04-24 PROCEDURE — A9592: CPT

## 2023-04-27 ENCOUNTER — APPOINTMENT (OUTPATIENT)
Dept: RADIATION ONCOLOGY | Facility: CLINIC | Age: 38
End: 2023-04-27
Payer: COMMERCIAL

## 2023-04-27 PROCEDURE — 99215 OFFICE O/P EST HI 40 MIN: CPT | Mod: 95

## 2023-04-28 NOTE — HISTORY OF PRESENT ILLNESS
[Home] : at home, [unfilled] , at the time of the visit. 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 \par \par The patient's neurological exam reveals a right partial sixth nerve palsy, right sensorineural hearing loss, but no lower cranial nerve deficit. The MRI reveals a 4 cm cerebellopontine angle meningioma that encase some branches of the basilar artery and encase all cranial foramen from Meckel's cave to Dorello's canal to internal auditory canal to the jugular foramen and all the way to the VB junction at the skull base. The tumor exerts mass effect on the brainstem without edema. \par \par Considering the patient's young age, symptoms and size of this tumor, she underwent surgical resection.  \par \par On 9/7/22 she underwent a right retrosigmoid craniotomy (extended retrosigmoid craniotomy with drilling of the petrous bone and exposure of the jugular bulb, Salazar IV resection of a cerebellopontine angle large complex meningioma with compression of the brainstem and involvement of all neural foramina). Pathology showed Meningioma WHO Grade I\par \par MRI on 3/8/2023 showed residual enhancing neoplasm within the right cavernous sinus and prepontine/cerebellar medullary cisterns with mild increase in size compared to previous scan post-operatively.  \par \par 4/5/23 Initial consultation. At time of this visit, she is largely neurologically intact.  She has some persistent difficulty with diplopia.  She denies headache, nausea, or vomiting. She has no weakness in her arms or legs.  She does not have hearing in her right ear.  \par \par 4/27/23 She presents for follow up. Dotatate PET performed on 4/24/23 pending read.

## 2023-05-03 ENCOUNTER — APPOINTMENT (OUTPATIENT)
Dept: OTOLARYNGOLOGY | Facility: CLINIC | Age: 38
End: 2023-05-03
Payer: COMMERCIAL

## 2023-05-03 PROCEDURE — 99214 OFFICE O/P EST MOD 30 MIN: CPT | Mod: 25

## 2023-05-03 PROCEDURE — 31579 LARYNGOSCOPY TELESCOPIC: CPT

## 2023-05-03 NOTE — HISTORY OF PRESENT ILLNESS
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) follow up for dysphonia\par Still having reflux issues but better since doubling on famotidine, no emesis\par taking 40 nexium morning, famotidine before dinner\par h/o R CPA tumor, decreased right hearing and double vision, slowly getting better\par Diagnosed with meningioma, excision on 9/7\par Having some Right ear hearing loss - 9/2/22 last audio test \par Having dysphagia to both solids and liquids following surgery, has coughing fits with food regurgitation a few times a week\par Had injection 1/4\par Patient states there is no acid reflux but there is coughing episodes during and after meal. \par States there is often a globus sensation while swallowing food. \par Currently receiving speech therapy x1 a week - feels like it is helping \par Doing vision therapy\par Denies have any problem in the nose.\par States getting an MRI this Wednesday since after the surgery. \par Patient states voice has been better when talking slower.

## 2023-05-04 ENCOUNTER — APPOINTMENT (OUTPATIENT)
Dept: ENDOCRINOLOGY | Facility: CLINIC | Age: 38
End: 2023-05-04

## 2023-05-09 RX ORDER — ESOMEPRAZOLE MAGNESIUM 40 MG/1
40 CAPSULE, DELAYED RELEASE ORAL TWICE DAILY
Qty: 60 | Refills: 3 | Status: DISCONTINUED | COMMUNITY
Start: 2023-04-07 | End: 2023-05-09

## 2023-05-09 RX ORDER — FAMOTIDINE 40 MG/1
40 TABLET, FILM COATED ORAL TWICE DAILY
Qty: 180 | Refills: 1 | Status: ACTIVE | COMMUNITY
Start: 2023-05-03 | End: 1900-01-01

## 2023-05-22 ENCOUNTER — APPOINTMENT (OUTPATIENT)
Dept: RADIATION ONCOLOGY | Facility: CLINIC | Age: 38
End: 2023-05-22
Payer: COMMERCIAL

## 2023-05-22 PROCEDURE — 99215 OFFICE O/P EST HI 40 MIN: CPT | Mod: 25,95

## 2023-05-24 NOTE — HISTORY OF PRESENT ILLNESS
[Home] : at home, [unfilled] , at the time of the visit. 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 \par \par The patient's neurological exam reveals a right partial sixth nerve palsy, right sensorineural hearing loss, but no lower cranial nerve deficit. The MRI reveals a 4 cm cerebellopontine angle meningioma that encase some branches of the basilar artery and encase all cranial foramen from Meckel's cave to Dorello's canal to internal auditory canal to the jugular foramen and all the way to the VB junction at the skull base. The tumor exerts mass effect on the brainstem without edema. \par \par Considering the patient's young age, symptoms and size of this tumor, she underwent surgical resection.  \par \par On 9/7/22 she underwent a right retrosigmoid craniotomy (extended retrosigmoid craniotomy with drilling of the petrous bone and exposure of the jugular bulb, Salazar IV resection of a cerebellopontine angle large complex meningioma with compression of the brainstem and involvement of all neural foramina). Pathology showed Meningioma WHO Grade I\par \par MRI on 3/8/2023 showed residual enhancing neoplasm within the right cavernous sinus and prepontine/cerebellar medullary cisterns with mild increase in size compared to previous scan post-operatively.  \par \par 4/5/23 Initial consultation. At time of this visit, she is largely neurologically intact.  She has some persistent difficulty with diplopia.  She denies headache, nausea, or vomiting. She has no weakness in her arms or legs.  She does not have hearing in her right ear.  \par \par 5/22/2023 - She presents today for further discussion regarding treatment options.  She has obtained a second opinion from [facility].  She is feeling well otherwise, with no new symptoms.

## 2023-06-22 ENCOUNTER — APPOINTMENT (OUTPATIENT)
Dept: OBGYN | Facility: CLINIC | Age: 38
End: 2023-06-22

## 2023-08-07 ENCOUNTER — APPOINTMENT (OUTPATIENT)
Dept: GASTROENTEROLOGY | Facility: CLINIC | Age: 38
End: 2023-08-07
Payer: COMMERCIAL

## 2023-08-07 VITALS
TEMPERATURE: 97.7 F | WEIGHT: 110 LBS | SYSTOLIC BLOOD PRESSURE: 120 MMHG | DIASTOLIC BLOOD PRESSURE: 81 MMHG | HEART RATE: 85 BPM | OXYGEN SATURATION: 98 % | BODY MASS INDEX: 18.88 KG/M2

## 2023-08-07 PROCEDURE — 99204 OFFICE O/P NEW MOD 45 MIN: CPT

## 2023-08-07 NOTE — CONSULT LETTER
[Dear  ___] : Dear  [unfilled], [Consult Letter:] : I had the pleasure of evaluating your patient, [unfilled]. [Please see my note below.] : Please see my note below. [Consult Closing:] : Thank you very much for allowing me to participate in the care of this patient.  If you have any questions, please do not hesitate to contact me. [Sincerely,] : Sincerely, [FreeTextEntry2] : Dr. Art Delacruz [FreeTextEntry3] : Nora Patel MD Director of GI Motility, [facility]  of Medicine Division of Gastroenterology [facility] at [address], [address] [place] [address] Tel: [phone] Motility Appts-Motility Coordinator: Franny Raheel: [phone]

## 2023-08-07 NOTE — REASON FOR VISIT
[Consultation] : a consultation visit [Spouse] : spouse [Other: _____] : [unfilled] [FreeTextEntry1] : Dr. Art Delacruz

## 2023-08-07 NOTE — REVIEW OF SYSTEMS
[Cough] : cough [As Noted in HPI] : as noted in HPI [Negative] : Heme/Lymph [FreeTextEntry4] : Dysphonia

## 2023-08-07 NOTE — ASSESSMENT
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  I recommend that she undergo esophageal manometry to rule out esophageal dysmotility disorder, we will also perform transnasal pH testing while off of medication to evaluate for true GERD.  1.  Esophageal manometry +48-hour pH/Z off of antacid therapy The risks, benefits and alternatives of the procedure were discussed with the patient in detail. Risks include nasal irritation, bleeding, coughing, sore throat and sinusitis. All questions were answered. The patient expressed understanding of these risks and is agreeable to proceed.  I  instructed the patient to discontinue his/her PPI x 7 days/H2B x 3 days prior to procedure.  Patient understands that he/she will be wearing a transnasal catheter for 48 hours.

## 2023-08-07 NOTE — HISTORY OF PRESENT ILLNESS
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 She developed right vocal cord paralysis for which she has been seeing Dr. Art Delacruz since 2023, status post injection to the vocal cord. Patient has complaints of chronic cough, seems to worsen with not eating for prolonged period of time.  She seldom has coughing at night but will start coffee towards the end of her sleep, upon awakening.  She feels extra salivation pooling under her tongue during this time.  She is taking Nexium 40 mg every morning, famotidine 40 mg in the midday, 40 mg at night.  In 2022, posttreatment MRI demonstrated subtle progression of residual disease, status post radiation. She has been receiving swallow therapy since 2022, had abnormal modified barium esophagram initially in September, with repeat MBS 2022 demonstrated mild oropharyngeal dysphagia On GI review of systems, she denies any dysphagia/odynophagia.  She does suffer from chronic cough.  She does not have retrosternal burning or globus sensation. Patient status post FEES 3/2023 -Noted evidence of laryngopharyngeal reflux with each p.o. trial provided as marked by return of green material to the level of the piriform sinuses and cricopharyngeal segment. Patient was sensitive to active LPR, volitional cough response was noted x1, airway protection was maintained Patient was able to reduce noted stasis upon spontaneous subsequent dry swallow. Premature spillage was noted with larger cup sip of thin liquids.  Patient demonstrated slower mastication of regular solids resulting in delayed collection in transport.  Piecemeal deglutition was noted with purée and solid consistencies. Pharyngeal stage with marked by mildly reduced base of tongue retraction and mildly delayed pharyngeal swallow trigger, however adequate Hyo laryngeal elevation and epiglottic closure. There was trace–mild stasis reviewed at the vallecula, bilateral piriform sinuses and posterior pharyngeal wall post primary swallow of purées and solids and larger cup sip of thin liquids, which reduced with subsequent spontaneous reswallows. Patient presents with baseline cough which was observed twice during assessment, no laryngeal penetration or aspiration was reviewed.

## 2023-08-10 ENCOUNTER — TRANSCRIPTION ENCOUNTER (OUTPATIENT)
Age: 38
End: 2023-08-10

## 2023-08-11 ENCOUNTER — TRANSCRIPTION ENCOUNTER (OUTPATIENT)
Age: 38
End: 2023-08-11

## 2023-08-14 ENCOUNTER — TRANSCRIPTION ENCOUNTER (OUTPATIENT)
Age: 38
End: 2023-08-14

## 2023-08-21 ENCOUNTER — TRANSCRIPTION ENCOUNTER (OUTPATIENT)
Age: 38
End: 2023-08-21

## 2023-08-21 NOTE — ED PROVIDER NOTE - TEMPLATE, MLM
Patient is a 84 year old male with PMH of HTN, HLD, pre-DM, BPH, COPD, ?ILD, pleural plaques 2/2 asbestosis, former smoker, colonic lesion (?neoplasm), pulm nodule, thyroid nodule, diastolic dysfunction, aortic ectasia, cellulitis, presenting with generalized weakness, fevers, and chills for day. Patient with fever, tachycardia, leukocytosis with lactic acidosis on admission and admitted for severe sepsis vs SIRS     Severe sepsis likely due to contained perforation of colonic neoplasm   New onset Afib, now on eliquis   - CT with focal mural thickening and associated loculated fluid collection at the junction of the descending and sigmoid colon suspicious for contained perforation of colonic neoplasm, progressed since 2019. Has atelectasis reported, no pneumonia.   - repeat CTAP with perforated neoplasm and slight interval increased in size of adjacent collection  - GI following, s/p EGD 8/16 - 2 nonbleeding duodenal ulcers  - MRSA PCR screen negative, off vancomycin    - Bcx negative   - 8/17 s/p OR for laparoscopic left colectomy with end colostomy creation   -- op note reviewed -- noted there was a focal perforation of the tumor into the abdominal wall-area around the perforation ws completely excised, no contamination of abdominal contents during procedure   - Repeat CTAP with no abscess, LLL loop colostomy, scattered pneumoperitoneum and subcutaneous emphysema along L chest/abd wall--post op changes; small b/l pleural effusions  - WBC trended up postop/reactive - noted downtrending, remains afebrile  - UA negative for infection    Recommendations:  CRS following  Stool sent for C.diff this morning -- negative   Discontinue pip-tazo and monitor off antibiotics    Monitor temps/CBC  Continue rest of care per primary team      Doris Connelly M.D.  [facility] [place], Division of Infectious Diseases  [phone]  After 5pm on weekdays and all day on weekends - please call [phone]     Cardiac

## 2023-08-22 ENCOUNTER — TRANSCRIPTION ENCOUNTER (OUTPATIENT)
Age: 38
End: 2023-08-22

## 2023-08-25 ENCOUNTER — NON-APPOINTMENT (OUTPATIENT)
Age: 38
End: 2023-08-25

## 2023-08-25 ENCOUNTER — APPOINTMENT (OUTPATIENT)
Dept: OPHTHALMOLOGY | Facility: CLINIC | Age: 38
End: 2023-08-25
Payer: COMMERCIAL

## 2023-08-25 PROCEDURE — 92014 COMPRE OPH EXAM EST PT 1/>: CPT

## 2023-08-25 PROCEDURE — 92060 SENSORIMOTOR EXAMINATION: CPT

## 2023-08-30 ENCOUNTER — APPOINTMENT (OUTPATIENT)
Dept: MRI IMAGING | Facility: IMAGING CENTER | Age: 38
End: 2023-08-30

## 2023-09-01 ENCOUNTER — APPOINTMENT (OUTPATIENT)
Dept: GASTROENTEROLOGY | Facility: HOSPITAL | Age: 38
End: 2023-09-01

## 2023-09-06 ENCOUNTER — OUTPATIENT (OUTPATIENT)
Dept: OUTPATIENT SERVICES | Facility: HOSPITAL | Age: 38
LOS: 1 days | End: 2023-09-06
Payer: COMMERCIAL

## 2023-09-06 ENCOUNTER — APPOINTMENT (OUTPATIENT)
Dept: GASTROENTEROLOGY | Facility: HOSPITAL | Age: 38
End: 2023-09-06
Payer: COMMERCIAL

## 2023-09-06 ENCOUNTER — TRANSCRIPTION ENCOUNTER (OUTPATIENT)
Age: 38
End: 2023-09-06

## 2023-09-06 VITALS
RESPIRATION RATE: 15 BRPM | DIASTOLIC BLOOD PRESSURE: 80 MMHG | HEART RATE: 80 BPM | WEIGHT: 111.99 LBS | TEMPERATURE: 97 F | SYSTOLIC BLOOD PRESSURE: 122 MMHG | OXYGEN SATURATION: 100 % | HEIGHT: 64 IN

## 2023-09-06 DIAGNOSIS — R05.3 CHRONIC COUGH: ICD-10-CM

## 2023-09-06 DIAGNOSIS — Z98.891 HISTORY OF UTERINE SCAR FROM PREVIOUS SURGERY: Chronic | ICD-10-CM

## 2023-09-06 DIAGNOSIS — Z98.890 OTHER SPECIFIED POSTPROCEDURAL STATES: Chronic | ICD-10-CM

## 2023-09-06 PROCEDURE — 91037 ESOPH IMPED FUNCTION TEST: CPT | Mod: 26

## 2023-09-06 PROCEDURE — C1889: CPT

## 2023-09-06 PROCEDURE — 91010 ESOPHAGUS MOTILITY STUDY: CPT | Mod: 26

## 2023-09-06 PROCEDURE — 91038 ESOPH IMPED FUNCT TEST > 1HR: CPT

## 2023-09-06 DEVICE — CATH VERSAFLEX Z PH: Type: IMPLANTABLE DEVICE | Status: FUNCTIONAL

## 2023-09-06 RX ORDER — FAMOTIDINE 10 MG/ML
1 INJECTION INTRAVENOUS
Refills: 0 | DISCHARGE

## 2023-09-06 RX ORDER — ESOMEPRAZOLE MAGNESIUM 40 MG/1
1 CAPSULE, DELAYED RELEASE ORAL
Refills: 0 | DISCHARGE

## 2023-09-06 NOTE — ASU PATIENT PROFILE, ADULT - NSICDXPASTSURGICALHX_GEN_ALL_CORE_FT
PAST SURGICAL HISTORY:  H/O foot surgery , right 2nd digit    S/P  22    S/P resection of meningioma

## 2023-09-06 NOTE — ASU PATIENT PROFILE, ADULT - NSICDXPASTMEDICALHX_GEN_ALL_CORE_FT
PAST MEDICAL HISTORY:  Costochondral chest pain 2/2020 - resolved    History of cerebral meningioma

## 2023-09-06 NOTE — PRE PROCEDURE NOTE - PRE PROCEDURE EVALUATION
Attending Physician:              Raj Soriano MD MSEd    Indication for Procedure:       Chronic cough                PAST MEDICAL & SURGICAL HISTORY:  History of cerebral meningioma      Costochondral chest pain  2020 - resolved      H/O foot surgery  , right 2nd digit      S/P   22      S/P resection of meningioma              See Allscripts Note for further details  ALLERGIES:  No Known Allergies    HOME MEDICATIONS:  acetaminophen 325 mg oral tablet: 2 tab(s) orally every 6 hours, As needed, Temp greater or equal to 38C (100.4F), Mild Pain (1 - 3)  famotidine 40 mg oral tablet: 1 orally once a day  NexIUM 40 mg oral delayed release capsule: 1 orally once a day  ocular lubricant ophthalmic ointment: 1 application to each affected eye 3 times a day, As needed, lubricant  ocular lubricant ophthalmic solution: 1 drop(s) to each affected eye every 4 hours, As needed, Dry Eyes  sodium chloride 0.65% nasal spray: 1 spray(s) nasal 4 times a day, As Needed      See Allscripts Note for further details    AICD/PPM: [ ] yes   [ x] no    PERTINENT LAB DATA:                      PHYSICAL EXAMINATION:    Height (cm): 162.6  Weight (kg): 50.8  BMI (kg/m2): 19.2  BSA (m2): 1.53T(C): 36.1  HR: 80  BP: 122/80  RR: 15  SpO2: 100%    Constitutional: NAD  HEENT: PERRLA, EOMI,    Neck:  No JVD  Respiratory: CTAB/L  Cardiovascular: S1 and S2  Gastrointestinal: BS+, soft, NT/ND  Extremities: No peripheral edema  Neurological: A/O x 3, no focal deficits  Psychiatric: Normal mood, normal affect  Skin: No rashes    ASA Class: I [ ]  II [x ]  III [ ]  IV [ ]    COMMENTS:    The patient is a suitable candidate for the planned procedure unless box checked [ ]  No, explain:

## 2023-09-06 NOTE — PRE PROCEDURE NOTE - GENERAL PROCEDURE NAME
Esophageal Manometry with 24 hour pH study off therapy Detail Level: Simple Instructions: This plan will send the code FBSE to the PM system.  DO NOT or CHANGE the price. Price (Do Not Change): 0.00

## 2023-09-07 PROCEDURE — 91038 ESOPH IMPED FUNCT TEST > 1HR: CPT | Mod: 26

## 2023-09-08 NOTE — PHYSICAL THERAPY INITIAL EVALUATION ADULT - RISK REDUCTION/PREVENTION, PT EVAL
Ears: no ear pain and no hearing problems. Nose: no nasal congestion and no nasal drainage. Mouth/Throat: no dysphagia, no hoarseness and no throat pain. Neck: no lumps, no pain, no stiffness and no swollen glands. risk factors

## 2023-09-14 ENCOUNTER — TRANSCRIPTION ENCOUNTER (OUTPATIENT)
Age: 38
End: 2023-09-14

## 2023-10-09 ENCOUNTER — APPOINTMENT (OUTPATIENT)
Dept: GASTROENTEROLOGY | Facility: CLINIC | Age: 38
End: 2023-10-09
Payer: COMMERCIAL

## 2023-10-09 PROCEDURE — 99213 OFFICE O/P EST LOW 20 MIN: CPT | Mod: 95

## 2023-10-18 ENCOUNTER — APPOINTMENT (OUTPATIENT)
Dept: OBGYN | Facility: CLINIC | Age: 38
End: 2023-10-18
Payer: COMMERCIAL

## 2023-10-18 VITALS
WEIGHT: 108 LBS | DIASTOLIC BLOOD PRESSURE: 77 MMHG | BODY MASS INDEX: 18.44 KG/M2 | SYSTOLIC BLOOD PRESSURE: 113 MMHG | HEART RATE: 74 BPM | HEIGHT: 64 IN

## 2023-10-18 DIAGNOSIS — Z01.419 ENCOUNTER FOR GYNECOLOGICAL EXAMINATION (GENERAL) (ROUTINE) W/OUT ABNORMAL FINDINGS: ICD-10-CM

## 2023-10-18 DIAGNOSIS — Z30.09 ENCOUNTER FOR OTHER GENERAL COUNSELING AND ADVICE ON CONTRACEPTION: ICD-10-CM

## 2023-10-18 DIAGNOSIS — H90.5 UNSPECIFIED SENSORINEURAL HEARING LOSS: ICD-10-CM

## 2023-10-18 PROCEDURE — 99395 PREV VISIT EST AGE 18-39: CPT

## 2023-11-01 ENCOUNTER — APPOINTMENT (OUTPATIENT)
Dept: OTOLARYNGOLOGY | Facility: CLINIC | Age: 38
End: 2023-11-01
Payer: COMMERCIAL

## 2023-11-01 PROCEDURE — 99214 OFFICE O/P EST MOD 30 MIN: CPT | Mod: 25

## 2023-11-01 PROCEDURE — 31579 LARYNGOSCOPY TELESCOPIC: CPT

## 2023-11-01 PROCEDURE — 69210 REMOVE IMPACTED EAR WAX UNI: CPT

## 2023-11-01 RX ORDER — FAMOTIDINE 20 MG/1
20 TABLET, FILM COATED ORAL
Qty: 90 | Refills: 1 | Status: ACTIVE | COMMUNITY
Start: 2023-11-01 | End: 1900-01-01

## 2023-11-08 PROBLEM — H90.5 NEURAL HEARING LOSS OF RIGHT EAR: Status: ACTIVE | Noted: 2023-01-25

## 2023-11-08 LAB
C TRACH RRNA SPEC QL NAA+PROBE: NOT DETECTED
CYTOLOGY CVX/VAG DOC THIN PREP: NORMAL
HPV HIGH+LOW RISK DNA PNL CVX: NOT DETECTED
N GONORRHOEA RRNA SPEC QL NAA+PROBE: NOT DETECTED
SOURCE AMPLIFICATION: NORMAL

## 2023-11-13 ENCOUNTER — APPOINTMENT (OUTPATIENT)
Dept: PULMONOLOGY | Facility: CLINIC | Age: 38
End: 2023-11-13
Payer: COMMERCIAL

## 2023-11-13 VITALS
SYSTOLIC BLOOD PRESSURE: 101 MMHG | HEIGHT: 64 IN | HEART RATE: 78 BPM | BODY MASS INDEX: 18.44 KG/M2 | DIASTOLIC BLOOD PRESSURE: 69 MMHG | WEIGHT: 108 LBS | OXYGEN SATURATION: 99 %

## 2023-11-13 DIAGNOSIS — Z01.812 ENCOUNTER FOR PREPROCEDURAL LABORATORY EXAMINATION: ICD-10-CM

## 2023-11-13 DIAGNOSIS — R09.A2 FOREIGN BODY SENSATION, THROAT: ICD-10-CM

## 2023-11-13 DIAGNOSIS — O09.512 SUPERVISION OF ELDERLY PRIMIGRAVIDA, SECOND TRIMESTER: ICD-10-CM

## 2023-11-13 DIAGNOSIS — K21.9 GASTRO-ESOPHAGEAL REFLUX DISEASE W/OUT ESOPHAGITIS: ICD-10-CM

## 2023-11-13 LAB — POCT - HEMOGLOBIN (HGB), QUANTITATIVE, TRANSCUTANEOUS: 13.8

## 2023-11-13 PROCEDURE — 95012 NITRIC OXIDE EXP GAS DETER: CPT

## 2023-11-13 PROCEDURE — ZZZZZ: CPT

## 2023-11-13 PROCEDURE — 94729 DIFFUSING CAPACITY: CPT

## 2023-11-13 PROCEDURE — 99203 OFFICE O/P NEW LOW 30 MIN: CPT | Mod: 25

## 2023-11-13 PROCEDURE — 88738 HGB QUANT TRANSCUTANEOUS: CPT

## 2023-11-13 PROCEDURE — 94010 BREATHING CAPACITY TEST: CPT

## 2023-11-13 PROCEDURE — 94727 GAS DIL/WSHOT DETER LNG VOL: CPT

## 2023-11-13 PROCEDURE — 71046 X-RAY EXAM CHEST 2 VIEWS: CPT

## 2023-11-17 ENCOUNTER — APPOINTMENT (OUTPATIENT)
Dept: NEUROSURGERY | Facility: CLINIC | Age: 38
End: 2023-11-17
Payer: COMMERCIAL

## 2023-11-17 VITALS
SYSTOLIC BLOOD PRESSURE: 104 MMHG | TEMPERATURE: 98.5 F | DIASTOLIC BLOOD PRESSURE: 72 MMHG | WEIGHT: 108 LBS | HEART RATE: 78 BPM | OXYGEN SATURATION: 98 % | BODY MASS INDEX: 18.44 KG/M2 | HEIGHT: 64 IN

## 2023-11-17 PROCEDURE — 99215 OFFICE O/P EST HI 40 MIN: CPT

## 2023-11-17 RX ORDER — ESOMEPRAZOLE MAGNESIUM 40 MG/1
40 CAPSULE, DELAYED RELEASE ORAL DAILY
Qty: 90 | Refills: 2 | Status: DISCONTINUED | COMMUNITY
Start: 1900-01-01 | End: 2023-11-17

## 2023-11-17 RX ORDER — FAMOTIDINE 40 MG/1
40 TABLET, FILM COATED ORAL
Qty: 90 | Refills: 3 | Status: DISCONTINUED | COMMUNITY
Start: 2023-03-01 | End: 2023-11-17

## 2023-11-27 RX ORDER — MOMETASONE FUROATE 220 UG/1
220 INHALANT RESPIRATORY (INHALATION)
Qty: 1 | Refills: 1 | Status: ACTIVE | COMMUNITY
Start: 2023-11-27 | End: 1900-01-01

## 2023-11-28 ENCOUNTER — TRANSCRIPTION ENCOUNTER (OUTPATIENT)
Age: 38
End: 2023-11-28

## 2023-12-02 ENCOUNTER — TRANSCRIPTION ENCOUNTER (OUTPATIENT)
Age: 38
End: 2023-12-02

## 2023-12-02 RX ORDER — BUDESONIDE 180 UG/1
180 AEROSOL, POWDER RESPIRATORY (INHALATION)
Qty: 1 | Refills: 5 | Status: ACTIVE | COMMUNITY
Start: 2023-12-02 | End: 1900-01-01

## 2023-12-05 RX ORDER — FLUTICASONE PROPIONATE 110 UG/1
110 AEROSOL, METERED RESPIRATORY (INHALATION)
Qty: 1 | Refills: 3 | Status: ACTIVE | COMMUNITY
Start: 2023-12-05 | End: 1900-01-01

## 2023-12-05 NOTE — PATIENT PROFILE ADULT - HOW PATIENT ADDRESSED, PROFILE
Called and spoke with the patient to let him know MsLori Aliza Mercedessmooth ordered his HST. Patient voices appreciated and understanding. Jesus MAZARIEGOS   Rosie

## 2023-12-06 ENCOUNTER — TRANSCRIPTION ENCOUNTER (OUTPATIENT)
Age: 38
End: 2023-12-06

## 2023-12-06 RX ORDER — FLUTICASONE FUROATE 100 UG/1
100 POWDER RESPIRATORY (INHALATION) DAILY
Qty: 1 | Refills: 5 | Status: ACTIVE | COMMUNITY
Start: 2023-12-06 | End: 1900-01-01

## 2023-12-22 ENCOUNTER — NON-APPOINTMENT (OUTPATIENT)
Age: 38
End: 2023-12-22

## 2023-12-22 ENCOUNTER — APPOINTMENT (OUTPATIENT)
Dept: OPHTHALMOLOGY | Facility: CLINIC | Age: 38
End: 2023-12-22
Payer: COMMERCIAL

## 2023-12-22 PROCEDURE — 92060 SENSORIMOTOR EXAMINATION: CPT

## 2023-12-22 PROCEDURE — 92014 COMPRE OPH EXAM EST PT 1/>: CPT

## 2023-12-29 RX ORDER — CLOTRIMAZOLE 10 MG/1
10 LOZENGE ORAL 3 TIMES DAILY
Qty: 93 | Refills: 1 | Status: ACTIVE | COMMUNITY
Start: 2023-12-29 | End: 1900-01-01

## 2023-12-29 RX ORDER — NYSTATIN 100000 [USP'U]/ML
100000 SUSPENSION ORAL 3 TIMES DAILY
Qty: 1 | Refills: 1 | Status: ACTIVE | COMMUNITY
Start: 2023-11-01 | End: 1900-01-01

## 2024-01-17 ENCOUNTER — APPOINTMENT (OUTPATIENT)
Dept: OBGYN | Facility: CLINIC | Age: 39
End: 2024-01-17
Payer: COMMERCIAL

## 2024-01-17 ENCOUNTER — ASOB RESULT (OUTPATIENT)
Age: 39
End: 2024-01-17

## 2024-01-17 VITALS
HEART RATE: 80 BPM | DIASTOLIC BLOOD PRESSURE: 75 MMHG | SYSTOLIC BLOOD PRESSURE: 115 MMHG | HEIGHT: 64 IN | WEIGHT: 110 LBS | BODY MASS INDEX: 18.78 KG/M2

## 2024-01-17 DIAGNOSIS — Z30.430 ENCOUNTER FOR INSERTION OF INTRAUTERINE CONTRACEPTIVE DEVICE: ICD-10-CM

## 2024-01-17 PROCEDURE — 58300 INSERT INTRAUTERINE DEVICE: CPT

## 2024-01-17 PROCEDURE — 76830 TRANSVAGINAL US NON-OB: CPT

## 2024-01-17 RX ADMIN — COPPER 0: 313.4 INTRAUTERINE DEVICE INTRAUTERINE at 00:00

## 2024-01-17 NOTE — PROCEDURE
[IUD Placement] : intrauterine device (IUD) placement [Time out performed] : Pre-procedure time out performed.  Patient's name, date of birth and procedure confirmed. [Consent Obtained] : Consent obtained [Risks] : risks [Benefits] : benefits [Alternatives] : alternatives [Patient] : patient [Infection] : infection [Bleeding] : bleeding [Pain] : pain [Expulsion] : expulsion [Failure] : failure [Uterine Perforation] : uterine perforation [Neg Pregnancy Test] : negative pregnancy test [Betadine] : Betadine [Tenaculum] : Tenaculum [Tolerated Well] : Patient tolerated the procedure well [No Complications] : No complications [Prevention of Pregnancy] : prevention of pregnancy [Easy Passage] : Easy passage [Sounded to ___ cm] : sounded to [unfilled] ~Ucm [Post Placement Transvag. US] : post placement transvaginal ultrasound [ParaGard IUD] : ParaGard IUD [None] : None

## 2024-01-17 NOTE — END OF VISIT
[FreeTextEntry4] : I, Millicentlola Barrientos, acted as a scribe on behalf of Dr. Yanna Landeros on 01/17/2024 .  All medical entries made by the scribe were at my, Dr. Yanna Landeros, direction and personally dictated by me on 01/17/2024 .. I have reviewed the chart and agree that the record accurately reflects my personal performance of the history, physical exam, assessment and plan. I have also personally directed, reviewed, and agreed with the chart.

## 2024-02-04 LAB
C TRACH RRNA SPEC QL NAA+PROBE: NOT DETECTED
HCG UR QL: NEGATIVE
N GONORRHOEA RRNA SPEC QL NAA+PROBE: NOT DETECTED
QUALITY CONTROL: YES
SOURCE AMPLIFICATION: NORMAL

## 2024-02-12 ENCOUNTER — APPOINTMENT (OUTPATIENT)
Dept: PULMONOLOGY | Facility: CLINIC | Age: 39
End: 2024-02-12
Payer: COMMERCIAL

## 2024-02-12 VITALS — SYSTOLIC BLOOD PRESSURE: 114 MMHG | HEART RATE: 80 BPM | OXYGEN SATURATION: 99 % | DIASTOLIC BLOOD PRESSURE: 81 MMHG

## 2024-02-12 DIAGNOSIS — R05.3 CHRONIC COUGH: ICD-10-CM

## 2024-02-12 DIAGNOSIS — R49.0 DYSPHONIA: ICD-10-CM

## 2024-02-12 PROCEDURE — 94010 BREATHING CAPACITY TEST: CPT

## 2024-02-12 PROCEDURE — 99213 OFFICE O/P EST LOW 20 MIN: CPT | Mod: 25

## 2024-02-12 PROCEDURE — 95012 NITRIC OXIDE EXP GAS DETER: CPT

## 2024-02-12 NOTE — HISTORY OF PRESENT ILLNESS
[Never] : never [TextBox_4] : Prior: She developed a persistent cough after surgery at there is cerebral pontine she was diagnosed with a meningioma and hearing loss she underwent surgery at the cerebral pontine angle since that time she did was complaining of a cough.  She appeared to develop a vocal cord paralysis and had aspiration issues.  These issues were addressed by GI and by ENT and she is no longer aspirating and her vocal cord is being augmented.  However she reports a persistent cough.  She has been treated with antireflux therapy without improvement.  Current: BASIL ACEVEDO is a 38 year old female, with history of cerebellopontine angle, right vocal cord paralysis, who presents to the office for follow up evaluation. Patient reports that she is feeling well overall and her cough has improved since her last visit. She states that she took prescribed inhaled steroids for 2 weeks although stopped it due to side-effects of thrush and does not feel any change in her symptoms afterwards.

## 2024-02-12 NOTE — ADDENDUM
[FreeTextEntry1] : I, Baldemarsaloni Radha, acted solely as a scribe for Dr. Devonte Ramirez M.D. on this date 02/12/2024.   All medical record entries made by the Scribe were at my, Dr. Devonte Ramirez M.D., direction and personally dictated by me on 02/12/2024. I have reviewed the chart and agree that the record accurately reflects my personal performance of the history, physical exam, assessment and plan. I have also personally directed, reviewed, and agreed with the chart.

## 2024-02-12 NOTE — ASSESSMENT
[FreeTextEntry1] : Cough has significantly improved. Maintain off inhaler-Advised patient to follow up if symptoms recur.

## 2024-02-21 ENCOUNTER — TRANSCRIPTION ENCOUNTER (OUTPATIENT)
Age: 39
End: 2024-02-21

## 2024-03-01 ENCOUNTER — TRANSCRIPTION ENCOUNTER (OUTPATIENT)
Age: 39
End: 2024-03-01

## 2024-05-01 ENCOUNTER — APPOINTMENT (OUTPATIENT)
Dept: OTOLARYNGOLOGY | Facility: CLINIC | Age: 39
End: 2024-05-01
Payer: COMMERCIAL

## 2024-05-01 ENCOUNTER — APPOINTMENT (OUTPATIENT)
Dept: ENDOCRINOLOGY | Facility: CLINIC | Age: 39
End: 2024-05-01
Payer: COMMERCIAL

## 2024-05-01 VITALS
HEART RATE: 73 BPM | BODY MASS INDEX: 19.29 KG/M2 | SYSTOLIC BLOOD PRESSURE: 129 MMHG | HEIGHT: 64 IN | WEIGHT: 113 LBS | DIASTOLIC BLOOD PRESSURE: 91 MMHG

## 2024-05-01 DIAGNOSIS — R73.09 OTHER ABNORMAL GLUCOSE: ICD-10-CM

## 2024-05-01 DIAGNOSIS — E01.0 IODINE-DEFICIENCY RELATED DIFFUSE (ENDEMIC) GOITER: ICD-10-CM

## 2024-05-01 DIAGNOSIS — N64.3 GALACTORRHEA NOT ASSOCIATED WITH CHILDBIRTH: ICD-10-CM

## 2024-05-01 PROCEDURE — 99204 OFFICE O/P NEW MOD 45 MIN: CPT

## 2024-05-01 PROCEDURE — 99214 OFFICE O/P EST MOD 30 MIN: CPT | Mod: 25

## 2024-05-01 PROCEDURE — G2211 COMPLEX E/M VISIT ADD ON: CPT

## 2024-05-01 PROCEDURE — 31579 LARYNGOSCOPY TELESCOPIC: CPT

## 2024-05-01 PROCEDURE — 36415 COLL VENOUS BLD VENIPUNCTURE: CPT

## 2024-05-01 NOTE — PHYSICAL EXAM

## 2024-05-01 NOTE — ASSESSMENT
[FreeTextEntry1] : Galactorrhea Elevated HbA1C Possible thyroid enlargement  Lab. tests today. Will await the results of the pituitary MRI scheduled at [facility] [place] for 5/2/24. Thyroid u/sound. F/U once the above results are available.

## 2024-05-01 NOTE — HISTORY OF PRESENT ILLNESS
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 The patient noticed galactorrhea in the shower, left breast only, on at least two occasions during the last 2 weeks. This was not related to any breast stimulation. Her periods have remained regular, about every 22 days. She had delivered a son on June 7, 2022 but had  him for only 2-3 months as she was then diagnosed with benign meningioma, treated with surgery and radiation. She is scheduled to have  a brain MRI with pituitary MRI tomorrow at [facility].  On 11/20/23 HbA1C was minimally elevated at 5.8%. Her weight has been stable; she has a h/o steroid tx for meningioma.

## 2024-05-01 NOTE — HISTORY OF PRESENT ILLNESS
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) follow up for dysphonia, doing well Had returned to therapy, helped from amairani Saw pulm, used inhalers for 4 weeks, helped, now off 1-2 vomiting episodes per month, reflux issues have improved, still some coughing off ppi and H2 blocker and didn't see increase in symptoms, no emesis Following with GI now off all meds f/u mri at [facility] tomorrow h/o R CPA tumor, stable decreased right hearing and double vision, slowly improving vision but not hearing Diagnosed with meningioma, excision on 9/7 Having some Right ear hearing loss - 9/2/22 last audio test  Improved dysphagia to both solids and liquids, less coughing fits with food regurgitation a few times a week Had injection 1/4/23 Pending endocrine States there is often a globus sensation while swallowing food.  Not currently receiving swallow therapy, did it at transitions, electrical stimulation  Doing vision therapy, diplopia got bit worse after proton Denies have any problem in the nose. Last MRI 10/24/2023 at [place], residual with no new growth, next imaging in 6 months  Finished proton therapy in July Patient states voice has been better when talking slower.

## 2024-05-02 ENCOUNTER — NON-APPOINTMENT (OUTPATIENT)
Age: 39
End: 2024-05-02

## 2024-05-06 NOTE — DISCHARGE NOTE PROVIDER - NSDCCPTREATMENT_GEN_ALL_CORE_FT
Bed: 21  Expected date:   Expected time:   Means of arrival:   Comments:  1ST TRIAGE  
Pt has not been able to take omeprazole x 3 days because she need her prescription refilled. Pt feel the medication is not working because she is still in pain.   
PRINCIPAL PROCEDURE  Procedure: Craniotomy for meningioma  Findings and Treatment:

## 2024-05-13 ENCOUNTER — NON-APPOINTMENT (OUTPATIENT)
Age: 39
End: 2024-05-13

## 2024-05-16 ENCOUNTER — NON-APPOINTMENT (OUTPATIENT)
Age: 39
End: 2024-05-16

## 2024-05-17 ENCOUNTER — OUTPATIENT (OUTPATIENT)
Dept: OUTPATIENT SERVICES | Facility: HOSPITAL | Age: 39
LOS: 1 days | End: 2024-05-17
Payer: COMMERCIAL

## 2024-05-17 ENCOUNTER — APPOINTMENT (OUTPATIENT)
Dept: NEUROSURGERY | Facility: CLINIC | Age: 39
End: 2024-05-17
Payer: COMMERCIAL

## 2024-05-17 ENCOUNTER — APPOINTMENT (OUTPATIENT)
Dept: ULTRASOUND IMAGING | Facility: CLINIC | Age: 39
End: 2024-05-17
Payer: COMMERCIAL

## 2024-05-17 DIAGNOSIS — E01.0 IODINE-DEFICIENCY RELATED DIFFUSE (ENDEMIC) GOITER: ICD-10-CM

## 2024-05-17 DIAGNOSIS — D32.0 BENIGN NEOPLASM OF CEREBRAL MENINGES: ICD-10-CM

## 2024-05-17 DIAGNOSIS — Z98.891 HISTORY OF UTERINE SCAR FROM PREVIOUS SURGERY: Chronic | ICD-10-CM

## 2024-05-17 DIAGNOSIS — Z98.890 OTHER SPECIFIED POSTPROCEDURAL STATES: Chronic | ICD-10-CM

## 2024-05-17 PROCEDURE — 99215 OFFICE O/P EST HI 40 MIN: CPT

## 2024-05-17 PROCEDURE — 76536 US EXAM OF HEAD AND NECK: CPT

## 2024-05-17 PROCEDURE — 76536 US EXAM OF HEAD AND NECK: CPT | Mod: 26

## 2024-05-18 ENCOUNTER — NON-APPOINTMENT (OUTPATIENT)
Age: 39
End: 2024-05-18

## 2024-05-20 LAB
ALBUMIN SERPL ELPH-MCNC: 4.3 G/DL
ALP BLD-CCNC: 82 U/L
ALT SERPL-CCNC: 10 U/L
ANION GAP SERPL CALC-SCNC: 13 MMOL/L
AST SERPL-CCNC: 15 U/L
BASOPHILS # BLD AUTO: 0.04 K/UL
BASOPHILS NFR BLD AUTO: 0.7 %
BILIRUB SERPL-MCNC: <0.2 MG/DL
BUN SERPL-MCNC: 17 MG/DL
C PEPTIDE SERPL-MCNC: 1.3 NG/ML
CALCIUM SERPL-MCNC: 9.4 MG/DL
CHLORIDE SERPL-SCNC: 105 MMOL/L
CHOLEST SERPL-MCNC: 197 MG/DL
CO2 SERPL-SCNC: 22 MMOL/L
CREAT SERPL-MCNC: 0.56 MG/DL
EGFR: 119 ML/MIN/1.73M2
EOSINOPHIL # BLD AUTO: 0.1 K/UL
EOSINOPHIL NFR BLD AUTO: 1.8 %
ESTIMATED AVERAGE GLUCOSE: 117 MG/DL
GAD65 AB SER-MCNC: 0 NMOL/L
GLUCOSE SERPL-MCNC: 86 MG/DL
HBA1C MFR BLD HPLC: 5.7 %
HCT VFR BLD CALC: 36.7 %
HDLC SERPL-MCNC: 86 MG/DL
HGB BLD-MCNC: 12.1 G/DL
IMM GRANULOCYTES NFR BLD AUTO: 0.2 %
INSULIN ANTIBODIES-ESOTERIX: <5 UU/ML
LDLC SERPL CALC-MCNC: 101 MG/DL
LYMPHOCYTES # BLD AUTO: 2.13 K/UL
LYMPHOCYTES NFR BLD AUTO: 39.2 %
MAN DIFF?: NORMAL
MCHC RBC-ENTMCNC: 30.5 PG
MCHC RBC-ENTMCNC: 33 GM/DL
MCV RBC AUTO: 92.4 FL
MONOCYTES # BLD AUTO: 0.53 K/UL
MONOCYTES NFR BLD AUTO: 9.8 %
NEUTROPHILS # BLD AUTO: 2.62 K/UL
NEUTROPHILS NFR BLD AUTO: 48.3 %
NONHDLC SERPL-MCNC: 111 MG/DL
PANC ISLET CELL AB SER QL: NORMAL
PLATELET # BLD AUTO: 384 K/UL
POTASSIUM SERPL-SCNC: 4.6 MMOL/L
PROLACTIN SERPL-MCNC: 27.6 NG/ML
PROT SERPL-MCNC: 7.1 G/DL
RBC # BLD: 3.97 M/UL
RBC # FLD: 13.2 %
SODIUM SERPL-SCNC: 141 MMOL/L
T3 SERPL-MCNC: 95 NG/DL
T4 FREE SERPL-MCNC: 1.1 NG/DL
THYROGLOB AB SERPL-ACNC: <20 IU/ML
THYROPEROXIDASE AB SERPL IA-ACNC: <10 IU/ML
TRIGL SERPL-MCNC: 55 MG/DL
TSH SERPL-ACNC: 1.02 UIU/ML
WBC # FLD AUTO: 5.43 K/UL

## 2024-05-21 ENCOUNTER — NON-APPOINTMENT (OUTPATIENT)
Age: 39
End: 2024-05-21

## 2024-05-21 PROBLEM — N64.52 NIPPLE DISCHARGE: Status: ACTIVE | Noted: 2024-05-21

## 2024-05-22 ENCOUNTER — APPOINTMENT (OUTPATIENT)
Dept: BREAST CENTER | Facility: CLINIC | Age: 39
End: 2024-05-22
Payer: COMMERCIAL

## 2024-05-22 ENCOUNTER — NON-APPOINTMENT (OUTPATIENT)
Age: 39
End: 2024-05-22

## 2024-05-22 VITALS
WEIGHT: 113 LBS | SYSTOLIC BLOOD PRESSURE: 113 MMHG | BODY MASS INDEX: 19.29 KG/M2 | HEIGHT: 64 IN | DIASTOLIC BLOOD PRESSURE: 80 MMHG | HEART RATE: 78 BPM

## 2024-05-22 DIAGNOSIS — D24.2 BENIGN NEOPLASM OF LEFT BREAST: ICD-10-CM

## 2024-05-22 DIAGNOSIS — Z00.00 ENCOUNTER FOR GENERAL ADULT MEDICAL EXAMINATION W/OUT ABNORMAL FINDINGS: ICD-10-CM

## 2024-05-22 DIAGNOSIS — N64.52 NIPPLE DISCHARGE: ICD-10-CM

## 2024-05-22 DIAGNOSIS — Z80.42 FAMILY HISTORY OF MALIGNANT NEOPLASM OF PROSTATE: ICD-10-CM

## 2024-05-22 DIAGNOSIS — Z80.3 FAMILY HISTORY OF MALIGNANT NEOPLASM OF BREAST: ICD-10-CM

## 2024-05-22 PROCEDURE — 99203 OFFICE O/P NEW LOW 30 MIN: CPT

## 2024-05-22 NOTE — HISTORY OF PRESENT ILLNESS
[FreeTextEntry1] :  Patient is a yo F who presents today for initial evaluation of L breast discharge. Patient states in Mid April/2024 she noticed her L breast had a trace amount of clear fluid. She was able to reproduce droplets of white discharge from the L breast at that time and 2 other times since. She was referred by Dr. Valdes (endocrinologist). Of note, patient has a history of cerebellar pontine meningioma s/p R retrosigmoid suboccipital [place] [facility] and proton radiation therapy ([place] [facility] Patient recently had a brain MRI [place] [facility] [place] which there was no evidence of pituitary or suprasellar mass and recent prolactin levels that ruled out prolactinoma per patient. She has hx of L US breast bx yielding fibroadenoma per patient (2009, Dr Carrillo). Endorses fam hx of breast cancer in maternal great aunt (dx 87, DOD) and maternal 2nd cousin (dx 39, DOD), unknown genetics. Fam hx of father prostate ca (dx 62). Patient denies palpable masses, skin changes b/l.  JIM Lifetime Risk- 16.5%

## 2024-05-22 NOTE — PLAN
[TextEntry] : Discussed possible causes of nipple discharge including physiologic discharge (amenorrhea, pituitary or thyroid origin), hormonal imbalance, duct ectasia, abscess/infections, pharmacological response, trauma, manipulation, surgery, or a breast lesion or mass. Discussed normal physiologic discharge when non-spontaneous, often appears bilaterally and may be opaque or cloudy in color. Patient to have annual B/l dx MG and US now. If benign, patient to have to have annual B/l MG and US in 1 year with office visit and re-examination as patient will be due to start yearly breast ca screening.  We discussed patient's low neeru score and no need for high risk screening MRIs.

## 2024-05-22 NOTE — PAST MEDICAL HISTORY
[Menarche Age ____] : age at menarche was [unfilled] [Definite ___ (Date)] : the last menstrual period was [unfilled] [Regular Cycle Intervals] : have been regular [Total Preg ___] : G[unfilled] [Live Births ___] : P[unfilled]  [Age At Live Birth ___] : Age at live birth: [unfilled] [History of Hormone Replacement Treatment] : has no history of hormone replacement treatment [FreeTextEntry6] : no [FreeTextEntry7] : yes IUD copper [FreeTextEntry8] : yes for 3 months

## 2024-05-22 NOTE — PHYSICAL EXAM
[de-identified] : Bilateral breast/axilla/supraclavicular area: No masses or adenopathy.  Minute droplet of milky discharge elicited bilaterally.

## 2024-05-23 PROBLEM — D32.0 MENINGIOMA, CEREBRAL: Status: ACTIVE | Noted: 2022-07-29

## 2024-05-23 NOTE — HISTORY OF PRESENT ILLNESS
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 Pt had adjuvant treatment of 30 sessions of radiation treatment from mid June - early July 2023. Pt states that she follows up at [facility] [place] Alberts. States that diplopia worsened after radiation but improved after steroid therapy. States that she still uses prism and follows up with Dr. Clemons.  Pathology - Meningioma (WHO grade 1)  Pt reports that she overall she feels well and is encouraged by her continued recovery.  States that she experienced some galactorrhea and is pending follow up with breast specialist Dr. Rios and Endocrinologist Dr. Corado.  She still follows up with Dr. Delacruz for vocal cord paralysis and she reports that closure is good.

## 2024-05-23 NOTE — PHYSICAL EXAM
[General Appearance - Alert] : alert [General Appearance - Well Nourished] : well nourished [General Appearance - Well-Appearing] : healthy appearing [Oriented To Time, Place, And Person] : oriented to person, place, and time [Person] : oriented to person [Place] : oriented to place [Time] : oriented to time [Sclera] : the sclera and conjunctiva were normal [Outer Ear] : the ears and nose were normal in appearance [Neck Appearance] : the appearance of the neck was normal [Exaggerated Use Of Accessory Muscles For Inspiration] : no accessory muscle use [Heart Rate And Rhythm] : heart rate was normal and rhythm regular [Abnormal Walk] : normal gait [Involuntary Movements] : no involuntary movements were seen [Skin Color & Pigmentation] : normal skin color and pigmentation [] : no rash

## 2024-05-23 NOTE — ASSESSMENT
Stop ASA 5 days before [FreeTextEntry1] : IMPRESSION: MRI brain done at [facility] [place] on 5/2/24 demonstrated Slight overall decrease in size of the subtotally resected and previously irradiated right skull base meningioma.  Pt is very pleased with outcome.   PLAN: MRI BRAIN W/WO IN 6 months F/U after imaging

## 2024-05-30 ENCOUNTER — TRANSCRIPTION ENCOUNTER (OUTPATIENT)
Age: 39
End: 2024-05-30

## 2024-06-01 ENCOUNTER — NON-APPOINTMENT (OUTPATIENT)
Age: 39
End: 2024-06-01

## 2024-06-03 ENCOUNTER — TRANSCRIPTION ENCOUNTER (OUTPATIENT)
Age: 39
End: 2024-06-03

## 2024-07-11 ENCOUNTER — NON-APPOINTMENT (OUTPATIENT)
Age: 39
End: 2024-07-11

## 2024-07-19 ENCOUNTER — NON-APPOINTMENT (OUTPATIENT)
Age: 39
End: 2024-07-19

## 2024-07-19 ENCOUNTER — APPOINTMENT (OUTPATIENT)
Dept: OPHTHALMOLOGY | Facility: CLINIC | Age: 39
End: 2024-07-19
Payer: COMMERCIAL

## 2024-07-19 PROCEDURE — 92060 SENSORIMOTOR EXAMINATION: CPT

## 2024-07-19 PROCEDURE — 92014 COMPRE OPH EXAM EST PT 1/>: CPT

## 2024-07-29 ENCOUNTER — RESULT REVIEW (OUTPATIENT)
Age: 39
End: 2024-07-29

## 2024-07-29 ENCOUNTER — APPOINTMENT (OUTPATIENT)
Dept: ULTRASOUND IMAGING | Facility: IMAGING CENTER | Age: 39
End: 2024-07-29
Payer: COMMERCIAL

## 2024-07-29 ENCOUNTER — OUTPATIENT (OUTPATIENT)
Dept: OUTPATIENT SERVICES | Facility: HOSPITAL | Age: 39
LOS: 1 days | End: 2024-07-29
Payer: COMMERCIAL

## 2024-07-29 ENCOUNTER — APPOINTMENT (OUTPATIENT)
Dept: MAMMOGRAPHY | Facility: IMAGING CENTER | Age: 39
End: 2024-07-29
Payer: COMMERCIAL

## 2024-07-29 DIAGNOSIS — Z98.890 OTHER SPECIFIED POSTPROCEDURAL STATES: Chronic | ICD-10-CM

## 2024-07-29 DIAGNOSIS — Z00.8 ENCOUNTER FOR OTHER GENERAL EXAMINATION: ICD-10-CM

## 2024-07-29 DIAGNOSIS — Z98.891 HISTORY OF UTERINE SCAR FROM PREVIOUS SURGERY: Chronic | ICD-10-CM

## 2024-07-29 PROCEDURE — 77066 DX MAMMO INCL CAD BI: CPT

## 2024-07-29 PROCEDURE — G0279: CPT | Mod: 26

## 2024-07-29 PROCEDURE — 77066 DX MAMMO INCL CAD BI: CPT | Mod: 26

## 2024-07-29 PROCEDURE — 76641 ULTRASOUND BREAST COMPLETE: CPT | Mod: 26,50

## 2024-07-29 PROCEDURE — G0279: CPT

## 2024-07-29 PROCEDURE — 76641 ULTRASOUND BREAST COMPLETE: CPT

## 2024-08-01 ENCOUNTER — NON-APPOINTMENT (OUTPATIENT)
Age: 39
End: 2024-08-01

## 2024-08-12 ENCOUNTER — TRANSCRIPTION ENCOUNTER (OUTPATIENT)
Age: 39
End: 2024-08-12

## 2024-11-06 ENCOUNTER — APPOINTMENT (OUTPATIENT)
Dept: OTOLARYNGOLOGY | Facility: CLINIC | Age: 39
End: 2024-11-06
Payer: COMMERCIAL

## 2024-11-06 VITALS — DIASTOLIC BLOOD PRESSURE: 86 MMHG | HEART RATE: 69 BPM | OXYGEN SATURATION: 99 % | SYSTOLIC BLOOD PRESSURE: 119 MMHG

## 2024-11-06 PROCEDURE — 99214 OFFICE O/P EST MOD 30 MIN: CPT | Mod: 25

## 2024-11-06 PROCEDURE — 31579 LARYNGOSCOPY TELESCOPIC: CPT

## 2024-11-06 PROCEDURE — 69210 REMOVE IMPACTED EAR WAX UNI: CPT

## 2024-12-05 NOTE — OB RN DELIVERY SUMMARY - BABY A: WEIGHT IN OUNCES (FROM GRAMS), DELIVERY
[facility]   Physical Therapy Treatment Note     Name: Smooth Perez  Clinic Number: [id number]    Therapy Diagnosis:   Encounter Diagnoses   Name Primary?    Decreased range of motion of left knee Yes    Atrophy of quadriceps femoris muscle     Weakness of left quadriceps muscle     S/P arthroscopic reconstruction of ACL of left knee using quadriceps tendon autograft        Physician: Sarkis Meza MD    Visit Date: 12/5/2024    Physician Orders: PT Eval and Treat  Medical Diagnosis from Referral: Peripheral tear of medial meniscus of left knee as current injury, subsequent encounter [S83.222D], Rupture of anterior cruciate ligament of left knee, subsequent encounter [S83.512D]   Evaluation Date: 9/12/2024  Authorization Period Expiration: 12/31/24  Plan of Care Expiration: 12/31//2024  Progress Note Due: 12/21/24  Visit # / Visits authorized: 24/32 (1/1 eval)  FOTO: 1/3 (last performed on 9/12/2024)    Date of Surgery   9/10/24   Surgery Performed   AMNION TRIAL, RECONSTRUCTION, KNEE, ACL, ARTHROSCOPIC (Left)  LYSIS, ADHESIONS, KNEE, ARTHROSCOPIC (Left)      Post-Op Precautions   ACL recon protocol  Weight bearing: as tolerated  Range of motion: as tolerated  Antibiotics: 5 days of PO prophylactic antibiotics  DVT Ppx: Eliquis  PT/OT: Start POD#3  Follow-up: 10-14 days with AP/Lateral of operative knee (non-weightbearing)       Time In: 1:00 PM   Time Out: 2:15 PM  Total Billable Time: 75 minutes    SUBJECTIVE     Pt reports:  she has been working on her knee range of motion at home. She reports she is able to touch her heel to butt.  She was compliant with home exercise program.  Response to previous treatment: no notable change  Functional change: no notable change    Pain: 1/10  Location: left knee    OBJECTIVE     10/17/24:  Able to reach full knee extension in CKC. Still unable to perform SLR without lag.     10/31:  Full knee extension range of motion in OKC and CKC.  Flexion to 135 degrees. Very mild lag at terminal extension.    11/19/24:  Able to achieve full knee extension in OKC and CKC with warm-up and/or overpressure. No lag with SLR at terminal extension. Flexion to 140 degrees.            11/25/24:  PROM:  L: -1-0-138  R:-6-0-150  AROM:  L: 0-2-130  R: -3-0-140  Hip Abduction Dynamometry:  L: 21.2  R:20.5  LSI: 103%  Hip ER Dynamometry (seated 90/90)  L: 21.3  R: 21.9  LSI: 97%      Treatment     Smooth received the treatments listed below:      Intervention Code  (see below chart) 12/5/24   Upright Bike TE 5 minutes   [employer]:  Hamstring Scoops  Quad Pulls TA 1 x 10 yards each   Manual Therapy  MT Patella mobs; knee PROM to improve flexion and extension   Single Leg Shuttle Squats NR 4 bands; 3x10; focus on reaching terminal extension at top   Split Squats NR 3x10   Step Up NR 3x10 - 12 inches   Step Down NR 3x10 - 6 inches   BFR: Ancore LAQ TE BFR Set/Rep scheme - 5#   Tank Pull TE 5 x 20 yard laps; L3       15  minutes of Therapeutic Exercise (TE) to develop strength, endurance, ROM, and flexibility. (13962)  10 minutes of Manual therapy (MT) to improve pain and ROM. (73657)  25 minutes of Neuromuscular Re-Education (NR)  to improve: Balance, Coordination, Kinesthetic, Sense, Proprioception, and Posture. (28307)  10 minutes of Therapeutic Activities (TA) to improve functional performance. (13081)    0  minutes Physical Performance Test  Unattended Electrical Stimulation (ES) for muscle performance and/or pain modulation. (75478)   Vasopneumatic Device Therapy () for management of swelling/edema. (37388)  BFR: Blood flow restriction applied during exercise  NP: Not Performed      Patient Education and Home Exercises     Home Exercises Provided and Patient Education Provided     Education provided:   - HEP, PT POC    Written Home Exercises Provided: yes. Exercises were reviewed and Smooth was able to demonstrate them prior to the end of the session.   Smooth demonstrated good  understanding of the education provided. See EMR under Patient Instructions for exercises provided during therapy sessions    ASSESSMENT     Patient tolerated today's treatment well. Good knee flexion range of motion. Minor deficit with knee extension range of motion persists. She will benefit from continued care.    Smooth Is progressing well towards her goals.   Pt prognosis is Excellent.     Pt will continue to benefit from skilled outpatient physical therapy to address the deficits listed in the problem list box on initial evaluation, provide pt/family education and to maximize pt's level of independence in the home and community environment.     Pt's spiritual, cultural and educational needs considered and pt agreeable to plan of care and goals.     Anticipated barriers to physical therapy: None    Goals:  Short Term Goals:  6 weeks Status  Date Met   PAIN: Pt will report worst pain of 4/10 in order to progress toward max functional ability and improve quality of life. [] Progressing  [x] Met  [] Not Met     FUNCTION: Patient will demonstrate improved function as indicated by a functional score of greater than or equal to 5 out of 100 on FOTO. [x] Progressing  [] Met  [] Not Met     MOBILITY: Patient will improve AROM to 50% of stated goals, listed in objective measures above, in order to progress towards independence with functional activities.  [x] Progressing  [] Met  [] Not Met     STRENGTH: Patient will improve strength to 40% of stated goals, listed in objective measures above, in order to progress towards independence with functional activities. [x] Progressing  [] Met  [] Not Met     HEP: Patient will demonstrate independence with HEP in order to progress toward functional independence. [x] Progressing  [] Met  [] Not Met        Long Term Goals:  12 weeks Status Date Met   PAIN: Pt will report worst pain of 2/10 in order to progress toward max functional ability and improve  quality of life [x] Progressing  [] Met  [] Not Met     FUNCTION: Patient will demonstrate improved function as indicated by a functional score of greater than or equal to 75 out of 100 on FOTO. [x] Progressing  [] Met  [] Not Met     MOBILITY: Patient will improve AROM to stated goals, listed in objective measures above, in order to return to maximal functional potential and improve quality of life. Goal: Full L knee ROM [x] Progressing  [] Met  [] Not Met     STRENGTH: Patient will improve strength to stated goals, listed in objective measures above, in order to improve functional independence and quality of life. Goal: 75% symmetry on Biodex Testing for LLE [x] Progressing  [] Met  [] Not Met     GAIT: Patient will demonstrate normalized gait mechanics with minimal compensation in order to return to PLOF. [x] Progressing  [] Met  [] Not Met        Final Goals:  9 months post-op Status Date Met   PAIN: Pt will report worst pain of 1/10 in order to progress toward max functional ability and improve quality of life [x] Progressing  [] Met  [] Not Met     FUNCTION: Patient will demonstrate improved function as indicated by a functional score of greater than or equal to 90 out of 100 on FOTO. [x] Progressing  [] Met  [] Not Met     STRENGTH: Patient will improve strength to stated goals, listed in objective measures above, in order to improve functional independence and quality of life. Goal: 95% symmetry on Biodex Testing for LLE [x] Progressing  [] Met  [] Not Met     Patient will return to normal ADL's, IADL's, community involvement, recreational activities, and work-related activities including volleyball. [x] Progressing  [] Met  [] Not Met         PLAN   Continue per plan of care.  Progress as tolerated.    Iraj Huang, PT, DPT        5

## 2025-01-24 ENCOUNTER — APPOINTMENT (OUTPATIENT)
Dept: MRI IMAGING | Facility: CLINIC | Age: 40
End: 2025-01-24

## 2025-01-24 ENCOUNTER — OUTPATIENT (OUTPATIENT)
Dept: OUTPATIENT SERVICES | Facility: HOSPITAL | Age: 40
LOS: 1 days | End: 2025-01-24
Payer: COMMERCIAL

## 2025-01-24 DIAGNOSIS — Z98.890 OTHER SPECIFIED POSTPROCEDURAL STATES: Chronic | ICD-10-CM

## 2025-01-24 DIAGNOSIS — Z98.891 HISTORY OF UTERINE SCAR FROM PREVIOUS SURGERY: Chronic | ICD-10-CM

## 2025-01-24 DIAGNOSIS — R10.12 LEFT UPPER QUADRANT PAIN: ICD-10-CM

## 2025-01-24 DIAGNOSIS — D32.0 BENIGN NEOPLASM OF CEREBRAL MENINGES: ICD-10-CM

## 2025-01-24 PROCEDURE — A9585: CPT

## 2025-01-24 PROCEDURE — 74183 MRI ABD W/O CNTR FLWD CNTR: CPT | Mod: 26

## 2025-01-24 PROCEDURE — 74183 MRI ABD W/O CNTR FLWD CNTR: CPT

## 2025-03-07 ENCOUNTER — APPOINTMENT (OUTPATIENT)
Dept: OPHTHALMOLOGY | Facility: CLINIC | Age: 40
End: 2025-03-07
Payer: COMMERCIAL

## 2025-03-07 ENCOUNTER — NON-APPOINTMENT (OUTPATIENT)
Age: 40
End: 2025-03-07

## 2025-03-07 PROCEDURE — 92014 COMPRE OPH EXAM EST PT 1/>: CPT

## 2025-03-07 PROCEDURE — 92060 SENSORIMOTOR EXAMINATION: CPT

## 2025-03-17 ENCOUNTER — APPOINTMENT (OUTPATIENT)
Dept: OBGYN | Facility: CLINIC | Age: 40
End: 2025-03-17
Payer: COMMERCIAL

## 2025-03-17 VITALS
DIASTOLIC BLOOD PRESSURE: 79 MMHG | WEIGHT: 122 LBS | HEIGHT: 64 IN | BODY MASS INDEX: 20.83 KG/M2 | HEART RATE: 76 BPM | SYSTOLIC BLOOD PRESSURE: 126 MMHG

## 2025-03-17 DIAGNOSIS — Z01.419 ENCOUNTER FOR GYNECOLOGICAL EXAMINATION (GENERAL) (ROUTINE) W/OUT ABNORMAL FINDINGS: ICD-10-CM

## 2025-03-17 PROCEDURE — 99459 PELVIC EXAMINATION: CPT

## 2025-03-17 PROCEDURE — 99396 PREV VISIT EST AGE 40-64: CPT

## 2025-03-19 LAB — HPV HIGH+LOW RISK DNA PNL CVX: NOT DETECTED

## 2025-03-20 LAB — CYTOLOGY CVX/VAG DOC THIN PREP: NORMAL

## 2025-03-21 ENCOUNTER — TRANSCRIPTION ENCOUNTER (OUTPATIENT)
Age: 40
End: 2025-03-21

## 2025-04-24 ENCOUNTER — NON-APPOINTMENT (OUTPATIENT)
Age: 40
End: 2025-04-24

## 2025-05-02 ENCOUNTER — OUTPATIENT (OUTPATIENT)
Dept: OUTPATIENT SERVICES | Facility: HOSPITAL | Age: 40
LOS: 1 days | End: 2025-05-02
Payer: COMMERCIAL

## 2025-05-02 ENCOUNTER — APPOINTMENT (OUTPATIENT)
Dept: ULTRASOUND IMAGING | Facility: CLINIC | Age: 40
End: 2025-05-02
Payer: COMMERCIAL

## 2025-05-02 DIAGNOSIS — Z98.890 OTHER SPECIFIED POSTPROCEDURAL STATES: Chronic | ICD-10-CM

## 2025-05-02 DIAGNOSIS — Z98.891 HISTORY OF UTERINE SCAR FROM PREVIOUS SURGERY: Chronic | ICD-10-CM

## 2025-05-02 DIAGNOSIS — Z01.419 ENCOUNTER FOR GYNECOLOGICAL EXAMINATION (GENERAL) (ROUTINE) WITHOUT ABNORMAL FINDINGS: ICD-10-CM

## 2025-05-02 PROCEDURE — 76830 TRANSVAGINAL US NON-OB: CPT

## 2025-05-02 PROCEDURE — 76830 TRANSVAGINAL US NON-OB: CPT | Mod: 26

## 2025-05-06 ENCOUNTER — TRANSCRIPTION ENCOUNTER (OUTPATIENT)
Age: 40
End: 2025-05-06

## 2025-05-28 NOTE — ASU DISCHARGE PLAN (ADULT/PEDIATRIC) - DISCHARGE TO
Patient ID: Sonja Dallas is a 61 y.o. female.    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 Reports that ear pain and dizziness started around 5/11. She states that the dizziness has been severe with associated nausea, dizziness is worse when getting up from a seated position. Her PCP prescribed meclizine which she has been taking without relief. Additionally, she has had pain in her LLE, typical of her sickle cell pain. Otherwise denies shortness of breath, chest pain, abdominal pain, N/V/D, F/C, H/a.     RX Allergies[1]    Medications Ordered Prior to Encounter[2]      Objective    BSA: There is no height or weight on file to calculate BSA.  LMP  (LMP Unknown)     Vitals:    05/28/25 0925   BP: 119/64   Pulse: 85   Resp: 18   Temp: 36.8 °C (98.2 °F)   SpO2: 100%          Physical Exam  Constitutional:       Appearance: Normal appearance.   HENT:      Head: Normocephalic.      Left Ear: Tympanic membrane, ear canal and external ear normal.      Ears:      Comments: Right ear pain when pulling and pushing on tragus, erythema, edema and blood within internal auditory canal      Mouth/Throat:      Mouth: Mucous membranes are moist.   Eyes:      Pupils: Pupils are equal, round, and reactive to light.   Cardiovascular:      Rate and Rhythm: Normal rate and regular rhythm.      Pulses: Normal pulses.      Heart sounds: Normal heart sounds.   Pulmonary:      Effort: Pulmonary effort is normal.      Breath sounds: Normal breath sounds.   Abdominal:      General: Abdomen is flat. Bowel sounds are normal.   Musculoskeletal:         General: Normal range of motion.      Cervical back: Normal range of motion and neck supple.   Skin:     General: Skin is warm.   Neurological:      General: No focal deficit present.      Mental Status: She is alert.    Psychiatric:         Mood and Affect: Mood normal.       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    [facility] course:   - VSS  - Hemoglobin and hemolysis labs within patient's baseline no indication for simple blood transfusion or vas occlusive crisis   - For sickle cell pain, patient given 0.4mg dilaudid subcutaneous q30 minutes x 3 doses   - 1x dose of PO Zofran for opioid induced N/V  - 1x dose of PO benadryl for opioid induced pruritus   - 1x dose of PO tylenol 650mg x1 for headache   - 1x dose of PO K+ 40meq for K+ of 3.0     Disposition:   - Augmentin 875-125mg BID x7 days (14T) prescription sent to [facility] pharmacy for otitis media   - Patient discharged home in stable condition   - Return to ED / clinic instructions given   - Instructed to follow-up with ENT as scheduled on 6/16, sickle cell clinic 7/16     Keke Cerna PA-C            [1]   Allergies  Allergen Reactions    Gabapentin Other     Abdominal pain    Gloves, Latex With Aloe Vera Swelling     Latex Gloves MISC    Latex Swelling     gloves    Morphine Unknown     itching    Naproxen Unknown   [2]   Current Outpatient Medications on File Prior to Visit   Medication Sig Dispense Refill    buPROPion XL (Wellbutrin XL) 300 mg 24 hr tablet Take 1 tablet (300 mg) by mouth once daily. 90 tablet 3    butalbital-acetaminophen-caff (Fioricet) -40 mg capsule Take 1 capsule by mouth every 6 hours if needed for headaches (migraine headaches). 20 capsule 0    cholecalciferol (Vitamin D3) 10 MCG (400 UNIT) tablet Take 1 tablet (10 mcg) by mouth once daily.      ferrous sulfate, 325 mg ferrous sulfate, tablet Take 1 tablet by mouth once daily.      FLAXSEED OIL ORAL Take with water in when needed      folic acid (Folvite) 1 mg tablet Take 1 tablet (1 mg) by mouth once daily.       ketorolac (Acular) 0.5 % ophthalmic solution Administer 1 drop into the left eye 4 times a day. 5 mL 1    meclizine (Antivert) 25 mg tablet Take 1 tablet (25 mg) by mouth 3 times a day as needed for dizziness for up to 10 days. 30 tablet 0    multivitamin (MULTIPLE VITAMINS ORAL) Take by mouth once daily.      mv,trey,iron,mn/folic acid/chol (HAIR-SKIN-NAILS, PABA, ORAL) Take by mouth.      naloxone (Narcan) 4 mg/0.1 mL nasal spray Administer 1 spray (4 mg) into affected nostril(s) if needed for opioid reversal. May repeat every 2-3 minutes if needed, alternating nostrils, until medical assistance becomes available. 2 each 0    omeprazole (PriLOSEC) 40 mg DR capsule Take 1 capsule (40 mg) by mouth once daily in the morning. 90 capsule 1    oxyCODONE (Roxicodone) 10 mg immediate release tablet Take 1 tablet (10 mg) by mouth every 6 hours if needed for severe pain (7 - 10). 60 tablet 0    potassium chloride CR 20 mEq ER tablet Take 2 tablets (40 mEq) by mouth once daily. 180 tablet 3    prednisoLONE acetate (Pred-Forte) 1 % ophthalmic suspension Administer 1 drop into the left eye 4 times a day. 5 mL 2    SUMAtriptan (Imitrex) 50 mg tablet Take 1 tablet (50 mg) by mouth 1 time if needed for migraine. May repeat dose once in 2 hours if no relief.  Do not exceed 2 doses in 24 hours. 9 tablet 0    triamterene-hydrochlorothiazid (Maxzide) 75-50 mg tablet TAKE 1 TABLET BY MOUTH EVERY DAY 90 tablet 1     No current facility-administered medications on file prior to visit.      Home

## 2025-06-11 ENCOUNTER — NON-APPOINTMENT (OUTPATIENT)
Age: 40
End: 2025-06-11

## 2025-06-13 ENCOUNTER — NON-APPOINTMENT (OUTPATIENT)
Age: 40
End: 2025-06-13

## 2025-06-13 ENCOUNTER — APPOINTMENT (OUTPATIENT)
Dept: NEUROSURGERY | Facility: CLINIC | Age: 40
End: 2025-06-13
Payer: COMMERCIAL

## 2025-06-13 VITALS
RESPIRATION RATE: 18 BRPM | HEIGHT: 64 IN | HEART RATE: 67 BPM | DIASTOLIC BLOOD PRESSURE: 84 MMHG | WEIGHT: 120 LBS | SYSTOLIC BLOOD PRESSURE: 117 MMHG | BODY MASS INDEX: 20.49 KG/M2 | OXYGEN SATURATION: 98 %

## 2025-06-13 PROCEDURE — 99214 OFFICE O/P EST MOD 30 MIN: CPT

## 2025-06-25 ENCOUNTER — APPOINTMENT (OUTPATIENT)
Dept: OTOLARYNGOLOGY | Facility: CLINIC | Age: 40
End: 2025-06-25

## 2025-06-25 VITALS — OXYGEN SATURATION: 98 % | SYSTOLIC BLOOD PRESSURE: 115 MMHG | DIASTOLIC BLOOD PRESSURE: 72 MMHG | HEART RATE: 71 BPM

## 2025-06-25 PROCEDURE — 99214 OFFICE O/P EST MOD 30 MIN: CPT | Mod: 25

## 2025-06-25 PROCEDURE — 31579 LARYNGOSCOPY TELESCOPIC: CPT

## 2025-07-09 NOTE — PATIENT PROFILE ADULT - HAVE YOU HAD A FIRST COVID-19 BOOSTER?
Writer attempted to call pt to relay lab results from 07/02/25; however, pt did not answer call and no VM set up at this time. Writer sent [employer] message with results   Yes

## 2025-08-22 ENCOUNTER — APPOINTMENT (OUTPATIENT)
Dept: ULTRASOUND IMAGING | Facility: IMAGING CENTER | Age: 40
End: 2025-08-22
Payer: COMMERCIAL

## 2025-08-22 ENCOUNTER — APPOINTMENT (OUTPATIENT)
Dept: MAMMOGRAPHY | Facility: IMAGING CENTER | Age: 40
End: 2025-08-22
Payer: COMMERCIAL

## 2025-08-22 ENCOUNTER — RESULT REVIEW (OUTPATIENT)
Age: 40
End: 2025-08-22

## 2025-08-22 ENCOUNTER — OUTPATIENT (OUTPATIENT)
Dept: OUTPATIENT SERVICES | Facility: HOSPITAL | Age: 40
LOS: 1 days | End: 2025-08-22
Payer: COMMERCIAL

## 2025-08-22 DIAGNOSIS — Z98.890 OTHER SPECIFIED POSTPROCEDURAL STATES: Chronic | ICD-10-CM

## 2025-08-22 DIAGNOSIS — Z98.891 HISTORY OF UTERINE SCAR FROM PREVIOUS SURGERY: Chronic | ICD-10-CM

## 2025-08-22 DIAGNOSIS — Z00.00 ENCOUNTER FOR GENERAL ADULT MEDICAL EXAMINATION WITHOUT ABNORMAL FINDINGS: ICD-10-CM

## 2025-08-22 DIAGNOSIS — D24.2 BENIGN NEOPLASM OF LEFT BREAST: ICD-10-CM

## 2025-08-22 DIAGNOSIS — Z00.8 ENCOUNTER FOR OTHER GENERAL EXAMINATION: ICD-10-CM

## 2025-08-22 DIAGNOSIS — N64.52 NIPPLE DISCHARGE: ICD-10-CM

## 2025-08-22 PROCEDURE — 77067 SCR MAMMO BI INCL CAD: CPT | Mod: 26

## 2025-08-22 PROCEDURE — 76641 ULTRASOUND BREAST COMPLETE: CPT

## 2025-08-22 PROCEDURE — 76641 ULTRASOUND BREAST COMPLETE: CPT | Mod: 26,50

## 2025-08-22 PROCEDURE — 77063 BREAST TOMOSYNTHESIS BI: CPT | Mod: 26

## 2025-08-22 PROCEDURE — 77063 BREAST TOMOSYNTHESIS BI: CPT

## 2025-08-22 PROCEDURE — 77067 SCR MAMMO BI INCL CAD: CPT

## 2025-09-18 ENCOUNTER — APPOINTMENT (OUTPATIENT)
Dept: BREAST CENTER | Facility: CLINIC | Age: 40
End: 2025-09-18
Payer: COMMERCIAL

## 2025-09-18 VITALS
SYSTOLIC BLOOD PRESSURE: 114 MMHG | HEART RATE: 70 BPM | WEIGHT: 127 LBS | DIASTOLIC BLOOD PRESSURE: 79 MMHG | HEIGHT: 64 IN | BODY MASS INDEX: 21.68 KG/M2

## 2025-09-18 DIAGNOSIS — Z87.898 PERSONAL HISTORY OF OTHER SPECIFIED CONDITIONS: ICD-10-CM

## 2025-09-18 DIAGNOSIS — D24.2 BENIGN NEOPLASM OF LEFT BREAST: ICD-10-CM

## 2025-09-18 PROCEDURE — 99213 OFFICE O/P EST LOW 20 MIN: CPT

## (undated) DEVICE — ELCTR BIPOLAR PROBE

## (undated) DEVICE — SUT NUROLON 4-0 8-18" TF (POP-OFF)

## (undated) DEVICE — DRSG TELFA 3 X 8

## (undated) DEVICE — ELCTR SUBDERMAL NDL CLASSIC 1.5M X 59" (6 COLOR)

## (undated) DEVICE — SUT VICRYL 3-0 18" X-1 (POP-OFF)

## (undated) DEVICE — ELCTR BOVIE PENCIL SMOKE EVACUATION

## (undated) DEVICE — DRSG DERMABOND PRINEO 22CM

## (undated) DEVICE — GLV 8.5 PROTEXIS (WHITE)

## (undated) DEVICE — SOL IRR POUR NS 0.9% 500ML

## (undated) DEVICE — Device

## (undated) DEVICE — WARMING BLANKET FULL ADULT

## (undated) DEVICE — SYR LUER LOK 20CC

## (undated) DEVICE — GLV 7.5 PROTEXIS (WHITE)

## (undated) DEVICE — FOLEY TRAY 16FR LF URINE METER SURESTEP

## (undated) DEVICE — DRSG CURITY GAUZE SPONGE 4 X 4" 12-PLY

## (undated) DEVICE — SPECIMEN CONTAINER 100ML

## (undated) DEVICE — GLV 7 PROTEXIS (WHITE)

## (undated) DEVICE — AESCULAP SCALPFIX 10 CLIPS

## (undated) DEVICE — MARKING PEN W RULER

## (undated) DEVICE — DRAPE 1/2 SHEET 40X57"

## (undated) DEVICE — SUT ETHILON 3-0 18" FS-1

## (undated) DEVICE — FOLEY HOLDER STATLOCK 2 WAY ADULT

## (undated) DEVICE — DRSG TAPE HYPAFIX 4"

## (undated) DEVICE — PREP CHLORAPREP HI-LITE ORANGE 10.5ML

## (undated) DEVICE — VENODYNE/SCD SLEEVE CALF LARGE

## (undated) DEVICE — SYR LUER LOK 5CC

## (undated) DEVICE — CANISTER SUCTION 2000CC

## (undated) DEVICE — DRAPE TOWEL BLUE 17" X 24"

## (undated) DEVICE — SUT VICRYL 2-0 18" CP-2 UNDYED (POP-OFF)

## (undated) DEVICE — DRAPE 3/4 SHEET W REINFORCEMENT 56X77"

## (undated) DEVICE — TUBING SUCTION CONN 6FT STERILE

## (undated) DEVICE — POSITIONER FOAM EGG CRATE ULNAR 2PCS (PINK)

## (undated) DEVICE — ELCTR 4-DISC 20MM 49" (RED, BLUE, GREEN, BLACK)

## (undated) DEVICE — SOL IRR POUR H2O 250ML

## (undated) DEVICE — SUCTION YANKAUER NO CONTROL VENT

## (undated) DEVICE — ELCTR SUBDERMAL NDL 27G X 1/2" WITH TWISTED PAIR

## (undated) DEVICE — ELCTR SUBDERMAL CORKSCREW NDL 1.2MM

## (undated) DEVICE — ELCTR MONOPOLAR STIMULATOR PROBE FLUSH-TIP

## (undated) DEVICE — SYR LUER LOK 50CC

## (undated) DEVICE — CODMAN PERFORATOR 14MM (BLUE)

## (undated) DEVICE — MIDAS REX MR8 BALL FLUTED SM BORE 3MM X 10CM

## (undated) DEVICE — GLV 8 PROTEXIS (WHITE)

## (undated) DEVICE — STAPLER SKIN VISI-STAT 35 WIDE

## (undated) DEVICE — LAP PAD 18 X 18"

## (undated) DEVICE — DRAPE CAMERA VIDEO 7"X96"

## (undated) DEVICE — MIDAS REX MR7 LUBRICANT DIFFUSER CARTRIDGE

## (undated) DEVICE — DRSG STOCKINETTE IMPERVIOUS XL

## (undated) DEVICE — VISITEC 4X4

## (undated) DEVICE — MEDICATION LABELS W MARKER

## (undated) DEVICE — SPHERE MARKER (5 SPHERES)

## (undated) DEVICE — DRSG XEROFORM 1 X 8"

## (undated) DEVICE — MIDAS REX MR8 TAPERED SM BORE 2.3MM X 7CM FOOTED

## (undated) DEVICE — MIDAS REX MR8 TAPERED SM BORE 1.MM X 7CM

## (undated) DEVICE — DRAPE MICROSCOPE ZEISS

## (undated) DEVICE — ELCTR BOVIE TIP BLADE INSULATED 2.75" EDGE

## (undated) DEVICE — ELCTR PEDICLE SCREW PROBE 3MM BALL 1.8MM X 100MM

## (undated) DEVICE — GLV 6.5 PROTEXIS (WHITE)

## (undated) DEVICE — ELCTR BIPOLAR CORD AESCULAP 12FT DISP